# Patient Record
Sex: MALE | Race: WHITE | NOT HISPANIC OR LATINO | Employment: OTHER | URBAN - METROPOLITAN AREA
[De-identification: names, ages, dates, MRNs, and addresses within clinical notes are randomized per-mention and may not be internally consistent; named-entity substitution may affect disease eponyms.]

---

## 2018-01-28 ENCOUNTER — HOSPITAL ENCOUNTER (OUTPATIENT)
Dept: RADIOLOGY | Facility: MEDICAL CENTER | Age: 58
End: 2018-01-28

## 2018-01-28 ENCOUNTER — HOSPITAL ENCOUNTER (INPATIENT)
Facility: MEDICAL CENTER | Age: 58
LOS: 12 days | DRG: 166 | End: 2018-02-09
Attending: HOSPITALIST | Admitting: HOSPITALIST
Payer: OTHER MISCELLANEOUS

## 2018-01-28 ENCOUNTER — HOSPITAL ENCOUNTER (OUTPATIENT)
Facility: MEDICAL CENTER | Age: 58
End: 2018-01-28

## 2018-01-28 ENCOUNTER — RESOLUTE PROFESSIONAL BILLING HOSPITAL PROF FEE (OUTPATIENT)
Dept: HOSPITALIST | Facility: MEDICAL CENTER | Age: 58
End: 2018-01-28
Payer: OTHER MISCELLANEOUS

## 2018-01-28 DIAGNOSIS — J98.4 CAVITARY LESION OF LUNG: ICD-10-CM

## 2018-01-28 DIAGNOSIS — J85.0 NECROTIZING PNEUMONIA (HCC): ICD-10-CM

## 2018-01-28 PROBLEM — R91.8 MULTIPLE PULMONARY NODULES: Status: ACTIVE | Noted: 2018-01-28

## 2018-01-28 PROBLEM — J18.9 PNEUMONIA: Status: ACTIVE | Noted: 2018-01-28

## 2018-01-28 LAB
ALBUMIN SERPL BCP-MCNC: 2.3 G/DL (ref 3.2–4.9)
ALBUMIN/GLOB SERPL: 0.7 G/DL
ALP SERPL-CCNC: 88 U/L (ref 30–99)
ALT SERPL-CCNC: 8 U/L (ref 2–50)
ANION GAP SERPL CALC-SCNC: 17 MMOL/L (ref 0–11.9)
AST SERPL-CCNC: 9 U/L (ref 12–45)
BILIRUB SERPL-MCNC: 0.3 MG/DL (ref 0.1–1.5)
BUN SERPL-MCNC: 27 MG/DL (ref 8–22)
CALCIUM SERPL-MCNC: 8.5 MG/DL (ref 8.5–10.5)
CHLORIDE SERPL-SCNC: 95 MMOL/L (ref 96–112)
CO2 SERPL-SCNC: 14 MMOL/L (ref 20–33)
CREAT SERPL-MCNC: 1.1 MG/DL (ref 0.5–1.4)
GLOBULIN SER CALC-MCNC: 3.4 G/DL (ref 1.9–3.5)
GLUCOSE BLD-MCNC: 340 MG/DL (ref 65–99)
GLUCOSE BLD-MCNC: 379 MG/DL (ref 65–99)
GLUCOSE SERPL-MCNC: 409 MG/DL (ref 65–99)
LACTATE BLD-SCNC: 1.8 MMOL/L (ref 0.5–2)
LACTATE BLD-SCNC: 2 MMOL/L (ref 0.5–2)
POTASSIUM SERPL-SCNC: 3.7 MMOL/L (ref 3.6–5.5)
PROCALCITONIN SERPL-MCNC: 6.26 NG/ML
PROT SERPL-MCNC: 5.7 G/DL (ref 6–8.2)
SODIUM SERPL-SCNC: 126 MMOL/L (ref 135–145)
TSH SERPL DL<=0.005 MIU/L-ACNC: 0.84 UIU/ML (ref 0.38–5.33)

## 2018-01-28 PROCEDURE — 84443 ASSAY THYROID STIM HORMONE: CPT

## 2018-01-28 PROCEDURE — 700102 HCHG RX REV CODE 250 W/ 637 OVERRIDE(OP): Performed by: HOSPITALIST

## 2018-01-28 PROCEDURE — 86606 ASPERGILLUS ANTIBODY: CPT

## 2018-01-28 PROCEDURE — 94760 N-INVAS EAR/PLS OXIMETRY 1: CPT

## 2018-01-28 PROCEDURE — 99223 1ST HOSP IP/OBS HIGH 75: CPT | Performed by: HOSPITALIST

## 2018-01-28 PROCEDURE — A9270 NON-COVERED ITEM OR SERVICE: HCPCS | Performed by: HOSPITALIST

## 2018-01-28 PROCEDURE — 770006 HCHG ROOM/CARE - MED/SURG/GYN SEMI*

## 2018-01-28 PROCEDURE — 36415 COLL VENOUS BLD VENIPUNCTURE: CPT

## 2018-01-28 PROCEDURE — 82962 GLUCOSE BLOOD TEST: CPT | Mod: 91

## 2018-01-28 PROCEDURE — 84145 PROCALCITONIN (PCT): CPT

## 2018-01-28 PROCEDURE — 83605 ASSAY OF LACTIC ACID: CPT | Mod: 91

## 2018-01-28 PROCEDURE — 80053 COMPREHEN METABOLIC PANEL: CPT

## 2018-01-28 PROCEDURE — 700105 HCHG RX REV CODE 258: Performed by: HOSPITALIST

## 2018-01-28 PROCEDURE — 87040 BLOOD CULTURE FOR BACTERIA: CPT

## 2018-01-28 PROCEDURE — 700111 HCHG RX REV CODE 636 W/ 250 OVERRIDE (IP): Performed by: HOSPITALIST

## 2018-01-28 PROCEDURE — 80061 LIPID PANEL: CPT

## 2018-01-28 PROCEDURE — 86635 COCCIDIOIDES ANTIBODY: CPT | Mod: 91

## 2018-01-28 PROCEDURE — 85007 BL SMEAR W/DIFF WBC COUNT: CPT

## 2018-01-28 PROCEDURE — 85027 COMPLETE CBC AUTOMATED: CPT

## 2018-01-28 RX ORDER — ACETAMINOPHEN 325 MG/1
650 TABLET ORAL EVERY 6 HOURS PRN
Status: DISCONTINUED | OUTPATIENT
Start: 2018-01-28 | End: 2018-02-09 | Stop reason: HOSPADM

## 2018-01-28 RX ORDER — PROMETHAZINE HYDROCHLORIDE 25 MG/1
12.5-25 SUPPOSITORY RECTAL EVERY 4 HOURS PRN
Status: DISCONTINUED | OUTPATIENT
Start: 2018-01-28 | End: 2018-02-09 | Stop reason: HOSPADM

## 2018-01-28 RX ORDER — AMOXICILLIN 250 MG
2 CAPSULE ORAL 2 TIMES DAILY
Status: DISCONTINUED | OUTPATIENT
Start: 2018-01-28 | End: 2018-02-09 | Stop reason: HOSPADM

## 2018-01-28 RX ORDER — ONDANSETRON 4 MG/1
4 TABLET, ORALLY DISINTEGRATING ORAL EVERY 4 HOURS PRN
Status: DISCONTINUED | OUTPATIENT
Start: 2018-01-28 | End: 2018-02-09 | Stop reason: HOSPADM

## 2018-01-28 RX ORDER — AZITHROMYCIN 250 MG/1
500 TABLET, FILM COATED ORAL ONCE
Status: DISCONTINUED | OUTPATIENT
Start: 2018-01-28 | End: 2018-01-28

## 2018-01-28 RX ORDER — OXYCODONE HYDROCHLORIDE 5 MG/1
2.5 TABLET ORAL
Status: DISCONTINUED | OUTPATIENT
Start: 2018-01-28 | End: 2018-02-09 | Stop reason: HOSPADM

## 2018-01-28 RX ORDER — OXYCODONE HYDROCHLORIDE 5 MG/1
5 TABLET ORAL
Status: DISCONTINUED | OUTPATIENT
Start: 2018-01-28 | End: 2018-02-09 | Stop reason: HOSPADM

## 2018-01-28 RX ORDER — DEXTROSE MONOHYDRATE 25 G/50ML
25 INJECTION, SOLUTION INTRAVENOUS
Status: DISCONTINUED | OUTPATIENT
Start: 2018-01-28 | End: 2018-02-09 | Stop reason: HOSPADM

## 2018-01-28 RX ORDER — GUAIFENESIN/DEXTROMETHORPHAN 100-10MG/5
10 SYRUP ORAL EVERY 6 HOURS PRN
Status: DISCONTINUED | OUTPATIENT
Start: 2018-01-28 | End: 2018-01-28

## 2018-01-28 RX ORDER — VORICONAZOLE 200 MG/1
200 TABLET, FILM COATED ORAL EVERY 12 HOURS
Status: DISCONTINUED | OUTPATIENT
Start: 2018-01-28 | End: 2018-02-06

## 2018-01-28 RX ORDER — SODIUM CHLORIDE 9 MG/ML
INJECTION, SOLUTION INTRAVENOUS CONTINUOUS
Status: DISCONTINUED | OUTPATIENT
Start: 2018-01-28 | End: 2018-01-30

## 2018-01-28 RX ORDER — PROMETHAZINE HYDROCHLORIDE 25 MG/1
12.5-25 TABLET ORAL EVERY 4 HOURS PRN
Status: DISCONTINUED | OUTPATIENT
Start: 2018-01-28 | End: 2018-02-09 | Stop reason: HOSPADM

## 2018-01-28 RX ORDER — IBUPROFEN 800 MG/1
400 TABLET ORAL EVERY 6 HOURS PRN
Status: DISCONTINUED | OUTPATIENT
Start: 2018-01-28 | End: 2018-02-09 | Stop reason: HOSPADM

## 2018-01-28 RX ORDER — ZOLPIDEM TARTRATE 5 MG/1
5 TABLET ORAL
Status: DISCONTINUED | OUTPATIENT
Start: 2018-01-28 | End: 2018-02-09 | Stop reason: HOSPADM

## 2018-01-28 RX ORDER — BISACODYL 10 MG
10 SUPPOSITORY, RECTAL RECTAL
Status: DISCONTINUED | OUTPATIENT
Start: 2018-01-28 | End: 2018-02-09 | Stop reason: HOSPADM

## 2018-01-28 RX ORDER — MORPHINE SULFATE 4 MG/ML
2 INJECTION, SOLUTION INTRAMUSCULAR; INTRAVENOUS
Status: DISCONTINUED | OUTPATIENT
Start: 2018-01-28 | End: 2018-02-09 | Stop reason: HOSPADM

## 2018-01-28 RX ORDER — AZITHROMYCIN 250 MG/1
250 TABLET, FILM COATED ORAL DAILY
Status: DISCONTINUED | OUTPATIENT
Start: 2018-01-29 | End: 2018-01-28

## 2018-01-28 RX ORDER — ONDANSETRON 2 MG/ML
4 INJECTION INTRAMUSCULAR; INTRAVENOUS EVERY 4 HOURS PRN
Status: DISCONTINUED | OUTPATIENT
Start: 2018-01-28 | End: 2018-02-09 | Stop reason: HOSPADM

## 2018-01-28 RX ORDER — POLYETHYLENE GLYCOL 3350 17 G/17G
1 POWDER, FOR SOLUTION ORAL
Status: DISCONTINUED | OUTPATIENT
Start: 2018-01-28 | End: 2018-02-09 | Stop reason: HOSPADM

## 2018-01-28 RX ADMIN — GUAIFENESIN 200 MG: 100 SOLUTION ORAL at 21:26

## 2018-01-28 RX ADMIN — SODIUM CHLORIDE: 9 INJECTION, SOLUTION INTRAVENOUS at 20:54

## 2018-01-28 RX ADMIN — TAZOBACTAM SODIUM AND PIPERACILLIN SODIUM 3.38 G: 375; 3 INJECTION, SOLUTION INTRAVENOUS at 20:55

## 2018-01-28 RX ADMIN — INSULIN HUMAN 6 UNITS: 100 INJECTION, SOLUTION PARENTERAL at 21:20

## 2018-01-28 RX ADMIN — VORICONAZOLE 200 MG: 200 TABLET, FILM COATED ORAL at 21:26

## 2018-01-28 RX ADMIN — SODIUM CHLORIDE: 9 INJECTION, SOLUTION INTRAVENOUS at 21:32

## 2018-01-28 ASSESSMENT — COPD QUESTIONNAIRES
HAVE YOU SMOKED AT LEAST 100 CIGARETTES IN YOUR ENTIRE LIFE: NO/DON'T KNOW
DURING THE PAST 4 WEEKS HOW MUCH DID YOU FEEL SHORT OF BREATH: NONE/LITTLE OF THE TIME
DO YOU EVER COUGH UP ANY MUCUS OR PHLEGM?: NO/ONLY WITH OCCASIONAL COLDS OR INFECTIONS
COPD SCREENING SCORE: 1

## 2018-01-28 ASSESSMENT — PAIN SCALES - GENERAL
PAINLEVEL_OUTOF10: 0
PAINLEVEL_OUTOF10: 0

## 2018-01-28 ASSESSMENT — PATIENT HEALTH QUESTIONNAIRE - PHQ9
SUM OF ALL RESPONSES TO PHQ9 QUESTIONS 1 AND 2: 0
SUM OF ALL RESPONSES TO PHQ QUESTIONS 1-9: 0
2. FEELING DOWN, DEPRESSED, IRRITABLE, OR HOPELESS: NOT AT ALL
1. LITTLE INTEREST OR PLEASURE IN DOING THINGS: NOT AT ALL

## 2018-01-28 ASSESSMENT — LIFESTYLE VARIABLES: EVER_SMOKED: NEVER

## 2018-01-28 NOTE — PROGRESS NOTES
Medical records received from [facility]:  ED report; Labs; CXR; CT chest; and Demographics.  Scanned into Media tab.

## 2018-01-28 NOTE — PROGRESS NOTES
Direct admit from [facility], Dr. Ball, [phone].  Accepted by Dr. Mota for Pneumonia and multiple bilateral cavitary nodules.  ADT signed & held @ [phone], needs to be released upon pt arrival.  No written orders received.  Pt coming by ground.

## 2018-01-28 NOTE — LETTER
January 29, 2018    Patient: Everardo Breaux   YOB: 1960   Date of Visit: 1/28/2018 to January 29, 2018       To Whom It May Concern:    Everardo Breaux was seen and treated at [facility].  He is currently admitted. Discharge date is unknown.             Sincerely,     Dr. Lito Giraldo

## 2018-01-29 PROBLEM — E87.6 HYPOKALEMIA: Status: ACTIVE | Noted: 2018-01-29

## 2018-01-29 PROBLEM — E11.9 TYPE II DIABETES MELLITUS (HCC): Chronic | Status: ACTIVE | Noted: 2018-01-29

## 2018-01-29 PROBLEM — E87.1 HYPONATREMIA: Status: ACTIVE | Noted: 2018-01-29

## 2018-01-29 PROBLEM — D64.9 NORMOCYTIC ANEMIA: Status: ACTIVE | Noted: 2018-01-29

## 2018-01-29 PROBLEM — E88.09 HYPOALBUMINEMIA: Status: ACTIVE | Noted: 2018-01-29

## 2018-01-29 PROBLEM — E83.51 HYPOCALCEMIA: Status: ACTIVE | Noted: 2018-01-29

## 2018-01-29 LAB
ALBUMIN SERPL BCP-MCNC: 2.6 G/DL (ref 3.2–4.9)
ALBUMIN/GLOB SERPL: 0.9 G/DL
ALP SERPL-CCNC: 74 U/L (ref 30–99)
ALT SERPL-CCNC: 7 U/L (ref 2–50)
ANION GAP SERPL CALC-SCNC: 15 MMOL/L (ref 0–11.9)
AST SERPL-CCNC: 11 U/L (ref 12–45)
BASOPHILS # BLD AUTO: 1.7 % (ref 0–1.8)
BASOPHILS # BLD: 0.15 K/UL (ref 0–0.12)
BILIRUB SERPL-MCNC: 0.3 MG/DL (ref 0.1–1.5)
BUN SERPL-MCNC: 27 MG/DL (ref 8–22)
CALCIUM SERPL-MCNC: 7.7 MG/DL (ref 8.5–10.5)
CHLORIDE SERPL-SCNC: 96 MMOL/L (ref 96–112)
CHOLEST SERPL-MCNC: 78 MG/DL (ref 100–199)
CO2 SERPL-SCNC: 17 MMOL/L (ref 20–33)
CREAT SERPL-MCNC: 0.97 MG/DL (ref 0.5–1.4)
EOSINOPHIL # BLD AUTO: 0.08 K/UL (ref 0–0.51)
EOSINOPHIL NFR BLD: 0.9 % (ref 0–6.9)
ERYTHROCYTE [DISTWIDTH] IN BLOOD BY AUTOMATED COUNT: 41.4 FL (ref 35.9–50)
GLOBULIN SER CALC-MCNC: 2.8 G/DL (ref 1.9–3.5)
GLUCOSE BLD-MCNC: 211 MG/DL (ref 65–99)
GLUCOSE BLD-MCNC: 241 MG/DL (ref 65–99)
GLUCOSE BLD-MCNC: 290 MG/DL (ref 65–99)
GLUCOSE BLD-MCNC: 315 MG/DL (ref 65–99)
GLUCOSE SERPL-MCNC: 290 MG/DL (ref 65–99)
GRAM STN SPEC: NORMAL
HCT VFR BLD AUTO: 32.7 % (ref 42–52)
HDLC SERPL-MCNC: 17 MG/DL
HGB BLD-MCNC: 10.8 G/DL (ref 14–18)
HIV 1+2 AB+HIV1 P24 AG SERPL QL IA: NON REACTIVE
LDLC SERPL CALC-MCNC: 44 MG/DL
LYMPHOCYTES # BLD AUTO: 1.53 K/UL (ref 1–4.8)
LYMPHOCYTES NFR BLD: 17.4 % (ref 22–41)
MANUAL DIFF BLD: NORMAL
MCH RBC QN AUTO: 28.6 PG (ref 27–33)
MCHC RBC AUTO-ENTMCNC: 33 G/DL (ref 33.7–35.3)
MCV RBC AUTO: 86.5 FL (ref 81.4–97.8)
MONOCYTES # BLD AUTO: 0.84 K/UL (ref 0–0.85)
MONOCYTES NFR BLD AUTO: 9.5 % (ref 0–13.4)
MORPHOLOGY BLD-IMP: NORMAL
NEUTROPHILS # BLD AUTO: 6.2 K/UL (ref 1.82–7.42)
NEUTROPHILS NFR BLD: 69.6 % (ref 44–72)
NEUTS BAND NFR BLD MANUAL: 0.9 % (ref 0–10)
NRBC # BLD AUTO: 0 K/UL
NRBC BLD-RTO: 0 /100 WBC
PLATELET # BLD AUTO: 238 K/UL (ref 164–446)
PLATELET BLD QL SMEAR: NORMAL
PMV BLD AUTO: 10 FL (ref 9–12.9)
POTASSIUM SERPL-SCNC: 3.3 MMOL/L (ref 3.6–5.5)
PROT SERPL-MCNC: 5.4 G/DL (ref 6–8.2)
RBC # BLD AUTO: 3.78 M/UL (ref 4.7–6.1)
RBC BLD AUTO: NORMAL
SIGNIFICANT IND 70042: NORMAL
SITE SITE: NORMAL
SODIUM SERPL-SCNC: 128 MMOL/L (ref 135–145)
SOURCE SOURCE: NORMAL
TRIGL SERPL-MCNC: 83 MG/DL (ref 0–149)
WBC # BLD AUTO: 8.8 K/UL (ref 4.8–10.8)

## 2018-01-29 PROCEDURE — 94760 N-INVAS EAR/PLS OXIMETRY 1: CPT

## 2018-01-29 PROCEDURE — 700102 HCHG RX REV CODE 250 W/ 637 OVERRIDE(OP): Performed by: HOSPITALIST

## 2018-01-29 PROCEDURE — A9270 NON-COVERED ITEM OR SERVICE: HCPCS | Performed by: HOSPITALIST

## 2018-01-29 PROCEDURE — 90670 PCV13 VACCINE IM: CPT | Performed by: FAMILY MEDICINE

## 2018-01-29 PROCEDURE — 87186 SC STD MICRODIL/AGAR DIL: CPT

## 2018-01-29 PROCEDURE — A9270 NON-COVERED ITEM OR SERVICE: HCPCS | Performed by: INTERNAL MEDICINE

## 2018-01-29 PROCEDURE — 700111 HCHG RX REV CODE 636 W/ 250 OVERRIDE (IP): Performed by: FAMILY MEDICINE

## 2018-01-29 PROCEDURE — A9270 NON-COVERED ITEM OR SERVICE: HCPCS | Performed by: FAMILY MEDICINE

## 2018-01-29 PROCEDURE — 94640 AIRWAY INHALATION TREATMENT: CPT

## 2018-01-29 PROCEDURE — 700101 HCHG RX REV CODE 250: Performed by: FAMILY MEDICINE

## 2018-01-29 PROCEDURE — 770006 HCHG ROOM/CARE - MED/SURG/GYN SEMI*

## 2018-01-29 PROCEDURE — 700105 HCHG RX REV CODE 258: Performed by: FAMILY MEDICINE

## 2018-01-29 PROCEDURE — 90471 IMMUNIZATION ADMIN: CPT

## 2018-01-29 PROCEDURE — 700102 HCHG RX REV CODE 250 W/ 637 OVERRIDE(OP): Performed by: FAMILY MEDICINE

## 2018-01-29 PROCEDURE — 82962 GLUCOSE BLOOD TEST: CPT | Mod: 91

## 2018-01-29 PROCEDURE — 87077 CULTURE AEROBIC IDENTIFY: CPT

## 2018-01-29 PROCEDURE — 87205 SMEAR GRAM STAIN: CPT

## 2018-01-29 PROCEDURE — 87389 HIV-1 AG W/HIV-1&-2 AB AG IA: CPT

## 2018-01-29 PROCEDURE — 87070 CULTURE OTHR SPECIMN AEROBIC: CPT

## 2018-01-29 PROCEDURE — 99233 SBSQ HOSP IP/OBS HIGH 50: CPT | Performed by: FAMILY MEDICINE

## 2018-01-29 PROCEDURE — 700102 HCHG RX REV CODE 250 W/ 637 OVERRIDE(OP): Performed by: INTERNAL MEDICINE

## 2018-01-29 PROCEDURE — 36415 COLL VENOUS BLD VENIPUNCTURE: CPT

## 2018-01-29 PROCEDURE — 700105 HCHG RX REV CODE 258: Performed by: HOSPITALIST

## 2018-01-29 RX ORDER — ALUMINA, MAGNESIA, AND SIMETHICONE 2400; 2400; 240 MG/30ML; MG/30ML; MG/30ML
10 SUSPENSION ORAL 4 TIMES DAILY PRN
Status: DISCONTINUED | OUTPATIENT
Start: 2018-01-29 | End: 2018-01-29

## 2018-01-29 RX ORDER — ALUMINA, MAGNESIA, AND SIMETHICONE 2400; 2400; 240 MG/30ML; MG/30ML; MG/30ML
10 SUSPENSION ORAL 4 TIMES DAILY PRN
Status: DISCONTINUED | OUTPATIENT
Start: 2018-01-29 | End: 2018-02-09 | Stop reason: HOSPADM

## 2018-01-29 RX ORDER — IPRATROPIUM BROMIDE AND ALBUTEROL SULFATE 2.5; .5 MG/3ML; MG/3ML
3 SOLUTION RESPIRATORY (INHALATION)
Status: DISCONTINUED | OUTPATIENT
Start: 2018-01-29 | End: 2018-02-09 | Stop reason: HOSPADM

## 2018-01-29 RX ORDER — DOXYCYCLINE 100 MG/1
100 TABLET ORAL EVERY 12 HOURS
Status: DISCONTINUED | OUTPATIENT
Start: 2018-01-29 | End: 2018-01-31

## 2018-01-29 RX ADMIN — VORICONAZOLE 200 MG: 200 TABLET, FILM COATED ORAL at 21:15

## 2018-01-29 RX ADMIN — SODIUM CHLORIDE: 9 INJECTION, SOLUTION INTRAVENOUS at 08:40

## 2018-01-29 RX ADMIN — GUAIFENESIN 200 MG: 100 SOLUTION ORAL at 15:19

## 2018-01-29 RX ADMIN — DOXYCYCLINE 100 MG: 100 TABLET ORAL at 14:38

## 2018-01-29 RX ADMIN — INSULIN HUMAN 5 UNITS: 100 INJECTION, SOLUTION PARENTERAL at 08:52

## 2018-01-29 RX ADMIN — VORICONAZOLE 200 MG: 200 TABLET, FILM COATED ORAL at 08:40

## 2018-01-29 RX ADMIN — ALUMINUM HYDROXIDE, MAGNESIUM HYDROXIDE,SIMETHICONE 10 ML: 400; 400; 40 LIQUID ORAL at 12:51

## 2018-01-29 RX ADMIN — SODIUM CHLORIDE: 9 INJECTION, SOLUTION INTRAVENOUS at 16:26

## 2018-01-29 RX ADMIN — OXYCODONE HYDROCHLORIDE 2.5 MG: 5 TABLET ORAL at 18:10

## 2018-01-29 RX ADMIN — OXYCODONE HYDROCHLORIDE 2.5 MG: 5 TABLET ORAL at 12:27

## 2018-01-29 RX ADMIN — ENOXAPARIN SODIUM 40 MG: 100 INJECTION SUBCUTANEOUS at 12:17

## 2018-01-29 RX ADMIN — INSULIN HUMAN 3 UNITS: 100 INJECTION, SOLUTION PARENTERAL at 18:05

## 2018-01-29 RX ADMIN — GUAIFENESIN 200 MG: 100 SOLUTION ORAL at 02:23

## 2018-01-29 RX ADMIN — OXYCODONE HYDROCHLORIDE 5 MG: 5 TABLET ORAL at 02:23

## 2018-01-29 RX ADMIN — PNEUMOCOCCAL 13-VALENT CONJUGATE VACCINE 0.5 ML: 2.2; 2.2; 2.2; 2.2; 2.2; 4.4; 2.2; 2.2; 2.2; 2.2; 2.2; 2.2; 2.2 INJECTION, SUSPENSION INTRAMUSCULAR at 14:38

## 2018-01-29 RX ADMIN — IPRATROPIUM BROMIDE AND ALBUTEROL SULFATE 3 ML: .5; 3 SOLUTION RESPIRATORY (INHALATION) at 20:22

## 2018-01-29 RX ADMIN — VANCOMYCIN HYDROCHLORIDE 2200 MG: 100 INJECTION, POWDER, LYOPHILIZED, FOR SOLUTION INTRAVENOUS at 16:26

## 2018-01-29 RX ADMIN — INSULIN HUMAN 6 UNITS: 100 INJECTION, SOLUTION PARENTERAL at 12:17

## 2018-01-29 RX ADMIN — INSULIN HUMAN 3 UNITS: 100 INJECTION, SOLUTION PARENTERAL at 21:19

## 2018-01-29 RX ADMIN — OXYCODONE HYDROCHLORIDE 5 MG: 5 TABLET ORAL at 23:10

## 2018-01-29 RX ADMIN — DOXYCYCLINE 100 MG: 100 TABLET ORAL at 21:15

## 2018-01-29 ASSESSMENT — PATIENT HEALTH QUESTIONNAIRE - PHQ9
SUM OF ALL RESPONSES TO PHQ9 QUESTIONS 1 AND 2: 0
SUM OF ALL RESPONSES TO PHQ QUESTIONS 1-9: 0
1. LITTLE INTEREST OR PLEASURE IN DOING THINGS: NOT AT ALL
2. FEELING DOWN, DEPRESSED, IRRITABLE, OR HOPELESS: NOT AT ALL

## 2018-01-29 ASSESSMENT — LIFESTYLE VARIABLES
HAVE PEOPLE ANNOYED YOU BY CRITICIZING YOUR DRINKING: NO
HAVE YOU EVER FELT YOU SHOULD CUT DOWN ON YOUR DRINKING: NO
CONSUMPTION TOTAL: NEGATIVE
AVERAGE NUMBER OF DAYS PER WEEK YOU HAVE A DRINK CONTAINING ALCOHOL: 0
EVER_SMOKED: YES
EVER HAD A DRINK FIRST THING IN THE MORNING TO STEADY YOUR NERVES TO GET RID OF A HANGOVER: NO
EVER FELT BAD OR GUILTY ABOUT YOUR DRINKING: NO
HOW MANY TIMES IN THE PAST YEAR HAVE YOU HAD 5 OR MORE DRINKS IN A DAY: 0
ON A TYPICAL DAY WHEN YOU DRINK ALCOHOL HOW MANY DRINKS DO YOU HAVE: 0
TOTAL SCORE: 0
ALCOHOL_USE: YES
TOTAL SCORE: 0
TOTAL SCORE: 0

## 2018-01-29 ASSESSMENT — ENCOUNTER SYMPTOMS
HEARTBURN: 0
BLURRED VISION: 0
NAUSEA: 0
COUGH: 0
NECK PAIN: 0
DEPRESSION: 0
DIZZINESS: 0
PALPITATIONS: 0
HEMOPTYSIS: 0
HEADACHES: 0
FEVER: 0
DOUBLE VISION: 0
MYALGIAS: 0
CHILLS: 0
BRUISES/BLEEDS EASILY: 0

## 2018-01-29 ASSESSMENT — PAIN SCALES - GENERAL
PAINLEVEL_OUTOF10: 0
PAINLEVEL_OUTOF10: 6
PAINLEVEL_OUTOF10: 0
PAINLEVEL_OUTOF10: 0
PAINLEVEL_OUTOF10: 1
PAINLEVEL_OUTOF10: 4
PAINLEVEL_OUTOF10: 7

## 2018-01-29 NOTE — PROGRESS NOTES
I am continually monitoring patient. I assumed care of pt at 07:00. Patient denies current needs. Plan of care for shift discussed with patient. Patient up independently and gait steady. Patient has call light in place, bed low position, and all needs are met.
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
## 2018-01-29 NOTE — PROGRESS NOTES
Patient arrived to unit. Height, wt, and med rec complete. Patient oriented to unit. Consent to treat signed and placed in chart. Dr. Ga paged for notification as Dr. Mota is no longer accepting direct admissions.
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
## 2018-01-29 NOTE — DISCHARGE PLANNING
note:  Rounding with MD done  MD to call pulmonologist and Id.  Dr. Bermudez was in room and she will recommend po abx.

## 2018-01-30 LAB
APTT PPP: 39.1 SEC (ref 24.7–36)
EST. AVERAGE GLUCOSE BLD GHB EST-MCNC: 312 MG/DL
GLUCOSE BLD-MCNC: 232 MG/DL (ref 65–99)
GLUCOSE BLD-MCNC: 266 MG/DL (ref 65–99)
GLUCOSE BLD-MCNC: 278 MG/DL (ref 65–99)
GLUCOSE BLD-MCNC: 452 MG/DL (ref 65–99)
HBA1C MFR BLD: 12.5 % (ref 0–5.6)
INR PPP: 1.19 (ref 0.87–1.13)
PROCALCITONIN SERPL-MCNC: 1.77 NG/ML
PROTHROMBIN TIME: 14.8 SEC (ref 12–14.6)

## 2018-01-30 PROCEDURE — 700102 HCHG RX REV CODE 250 W/ 637 OVERRIDE(OP): Performed by: INTERNAL MEDICINE

## 2018-01-30 PROCEDURE — 700102 HCHG RX REV CODE 250 W/ 637 OVERRIDE(OP): Performed by: HOSPITALIST

## 2018-01-30 PROCEDURE — 700105 HCHG RX REV CODE 258: Performed by: FAMILY MEDICINE

## 2018-01-30 PROCEDURE — 85730 THROMBOPLASTIN TIME PARTIAL: CPT

## 2018-01-30 PROCEDURE — 82962 GLUCOSE BLOOD TEST: CPT

## 2018-01-30 PROCEDURE — 700111 HCHG RX REV CODE 636 W/ 250 OVERRIDE (IP): Performed by: INTERNAL MEDICINE

## 2018-01-30 PROCEDURE — A9270 NON-COVERED ITEM OR SERVICE: HCPCS | Performed by: INTERNAL MEDICINE

## 2018-01-30 PROCEDURE — A9270 NON-COVERED ITEM OR SERVICE: HCPCS | Performed by: HOSPITALIST

## 2018-01-30 PROCEDURE — 85610 PROTHROMBIN TIME: CPT

## 2018-01-30 PROCEDURE — 770006 HCHG ROOM/CARE - MED/SURG/GYN SEMI*

## 2018-01-30 PROCEDURE — 36415 COLL VENOUS BLD VENIPUNCTURE: CPT

## 2018-01-30 PROCEDURE — 99232 SBSQ HOSP IP/OBS MODERATE 35: CPT | Performed by: INTERNAL MEDICINE

## 2018-01-30 PROCEDURE — 84145 PROCALCITONIN (PCT): CPT

## 2018-01-30 PROCEDURE — 700111 HCHG RX REV CODE 636 W/ 250 OVERRIDE (IP): Performed by: FAMILY MEDICINE

## 2018-01-30 PROCEDURE — 83036 HEMOGLOBIN GLYCOSYLATED A1C: CPT

## 2018-01-30 PROCEDURE — 700105 HCHG RX REV CODE 258: Performed by: HOSPITALIST

## 2018-01-30 RX ORDER — FUROSEMIDE 10 MG/ML
40 INJECTION INTRAMUSCULAR; INTRAVENOUS ONCE
Status: COMPLETED | OUTPATIENT
Start: 2018-01-30 | End: 2018-01-30

## 2018-01-30 RX ORDER — INSULIN GLARGINE 100 [IU]/ML
8 INJECTION, SOLUTION SUBCUTANEOUS EVERY EVENING
Status: DISCONTINUED | OUTPATIENT
Start: 2018-01-30 | End: 2018-02-01

## 2018-01-30 RX ADMIN — INSULIN GLARGINE 8 UNITS: 100 INJECTION, SOLUTION SUBCUTANEOUS at 19:50

## 2018-01-30 RX ADMIN — INSULIN HUMAN 5 UNITS: 100 INJECTION, SOLUTION PARENTERAL at 17:32

## 2018-01-30 RX ADMIN — GUAIFENESIN 200 MG: 100 SOLUTION ORAL at 03:14

## 2018-01-30 RX ADMIN — ENOXAPARIN SODIUM 40 MG: 100 INJECTION SUBCUTANEOUS at 08:20

## 2018-01-30 RX ADMIN — INSULIN HUMAN 9 UNITS: 100 INJECTION, SOLUTION PARENTERAL at 19:49

## 2018-01-30 RX ADMIN — VORICONAZOLE 200 MG: 200 TABLET, FILM COATED ORAL at 08:21

## 2018-01-30 RX ADMIN — INSULIN HUMAN 3 UNITS: 100 INJECTION, SOLUTION PARENTERAL at 08:22

## 2018-01-30 RX ADMIN — DOXYCYCLINE 100 MG: 100 TABLET ORAL at 19:40

## 2018-01-30 RX ADMIN — DOXYCYCLINE 100 MG: 100 TABLET ORAL at 08:21

## 2018-01-30 RX ADMIN — VANCOMYCIN HYDROCHLORIDE 1500 MG: 100 INJECTION, POWDER, LYOPHILIZED, FOR SOLUTION INTRAVENOUS at 17:27

## 2018-01-30 RX ADMIN — FUROSEMIDE 40 MG: 10 INJECTION, SOLUTION INTRAMUSCULAR; INTRAVENOUS at 13:37

## 2018-01-30 RX ADMIN — OXYCODONE HYDROCHLORIDE 5 MG: 5 TABLET ORAL at 17:27

## 2018-01-30 RX ADMIN — VORICONAZOLE 200 MG: 200 TABLET, FILM COATED ORAL at 19:40

## 2018-01-30 RX ADMIN — INSULIN HUMAN 5 UNITS: 100 INJECTION, SOLUTION PARENTERAL at 11:58

## 2018-01-30 RX ADMIN — VANCOMYCIN HYDROCHLORIDE 1500 MG: 100 INJECTION, POWDER, LYOPHILIZED, FOR SOLUTION INTRAVENOUS at 05:45

## 2018-01-30 RX ADMIN — SODIUM CHLORIDE: 9 INJECTION, SOLUTION INTRAVENOUS at 05:46

## 2018-01-30 ASSESSMENT — ENCOUNTER SYMPTOMS
MYALGIAS: 0
HEADACHES: 0
BLURRED VISION: 0
HEMOPTYSIS: 0
DEPRESSION: 0
ABDOMINAL PAIN: 0
DIZZINESS: 0
DOUBLE VISION: 0
BRUISES/BLEEDS EASILY: 0
SHORTNESS OF BREATH: 0
NAUSEA: 0
PALPITATIONS: 0
HEARTBURN: 0
VOMITING: 0
SPUTUM PRODUCTION: 1
CHILLS: 0
NECK PAIN: 0
FEVER: 1
WHEEZING: 0
DIARRHEA: 0
COUGH: 1
FEVER: 0

## 2018-01-30 ASSESSMENT — PAIN SCALES - GENERAL
PAINLEVEL_OUTOF10: 0
PAINLEVEL_OUTOF10: 5
PAINLEVEL_OUTOF10: 0
PAINLEVEL_OUTOF10: 2

## 2018-01-30 NOTE — PROGRESS NOTES
Pulmonary Critical Care Progress Note        Chief Complaint: Productive cough,fever    History of Present Illness: 57 y.o. male with history of DM type 2 presented to the ER as a transfer from [place]. He had symptoms of productive cough for 02 weeks duration accompanied by fever and sweating off and on. Expectoration is greenish brown. No hemoptysis.   Patient is a  and reports being exposed to sick contacts on a regular basis. He also has environmental exposure to dust, smoke and chemical toxins. He reports history of pneumonia twice previously. CT chest shows bilateral upper lobe consolidation with necrotic cavitary lesion and bilateral lower lobe patchy densities. Currently being treated empirically with antibiotics and antifungals. He also has hyponatremia and hyperglycemia.    ROS: Constitutional: Fever off and on, with sweating, generalized weakness: Respiratory: Dyspnea, productive cough, no pleuritic chest pain. No wheeze, Cardiac: Denies palpitations, angina, orthopnea or paroxysmal nocturnal dyspnea , GI: No nausea, vomiting abdominal pain.  All other systems negative. Neuro: Denies headache, dizziness or seizures. Gu: No hematuria or dysuria, Musculoskeletal: No joint inflammation or arthralgia.    Interval Events:  1/30/18: Hyperglycemia has improved. Frequency of cough decreased with copious expectoration. Hydration status has improved. Normal appetite. No dysuria    PFSH:  No change.    Respiratory:     Pulse Oximetry: 94 % on O2 supplementation        Exam: Not in respiratory distress  Accessory muscles of respiration are not prominent  Bilateral breath sounds audible with no wheeze or crepitations. No pleural rub.  Imaging: CT chest personally reviewed.  Bilateral upper lobe cavitary lesions thick walled with surrounding consolidation.  RML,RLL and LLL patchy infiltrates. No effusion    HemoDynamics:  Pulse: (!) 110  Blood Pressure: 146/83       Exam: First and second heart sounds  audible, tachycardia present. No murmur.        Neuro:  GCS      Exam: Alert and orientated to time place and person. No focal neurological deficit    Fluids:  Intake/Output       18 07 - 18 0659 18 - 18 0659 18 07 - 18 0659      [id number] 6188-8566 Total 5048-1891 1689-1112 Total 4029-2821 1406-3184 Total       Intake    I.V.  --  -- --  --  1300 1300  --  -- --    IV Piggyback Volume -- -- -- -- 300 300 -- -- --    IV Volume -- -- -- -- 1000 1000 -- -- --    Total Intake -- -- -- -- 1300 1300 -- -- --       Output    Urine  --  -- --  --  1525 1525  --  -- --    Void (ml) -- -- -- -- 1525 1525 -- -- --    Total Output -- -- -- -- 1525 1525 -- -- --       Net I/O     -- -- -- -- -225 -225 -- -- --           Recent Labs      18   SODIUM  126*  128*   POTASSIUM  3.7  3.3*   CHLORIDE  95*  96   CO2  14*  17*   BUN  27*  27*   CREATININE  1.10  0.97   CALCIUM  8.5  7.7*       GI/Nutrition:  Exam: Oral mucosa is moist.   Abdomen is soft, non tender. Bowel sounds audible.    Liver Function  Recent Labs      18   ALTSGPT  8  7   ASTSGOT  9*  11*   ALKPHOSPHAT  88  74   TBILIRUBIN  0.3  0.3   GLUCOSE  409*  290*       Heme:  Recent Labs      18   0135   RBC  3.78*   --    HEMOGLOBIN  10.8*   --    HEMATOCRIT  32.7*   --    PLATELETCT  238   --    PROTHROMBTM   --   14.8*   APTT   --   39.1*   INR   --   1.19*       Infectious Disease:  Temp  Av °C (98.6 °F)  Min: 36.4 °C (97.5 °F)  Max: 38.2 °C (100.8 °F)  Micro: Blood cultures no growth  Sputum Gram stain: Many WBCs and Gram positive cocci    Recent Labs      18   1909  18   2330   WBC   --   8.8   NEUTSPOLYS   --   69.60   LYMPHOCYTES   --   17.40*   MONOCYTES   --   9.50   EOSINOPHILS   --   0.90   BASOPHILS   --   1.70   ASTSGOT  9*  11*   ALTSGPT  8  7   ALKPHOSPHAT  88  74   TBILIRUBIN  0.3  0.3     Current  Facility-Administered Medications   Medication Dose Frequency Provider Last Rate Last Dose   • enoxaparin (LOVENOX) inj 40 mg  40 mg DAILY Lito Giraldo M.D.   40 mg at 01/30/18 0820   • mag hydrox-al hydrox-simeth (MAALOX PLUS ES or MYLANTA DS) suspension 10 mL  10 mL 4X/DAY PRN Lito Giraldo M.D.   10 mL at 01/29/18 1251   • doxycycline monohydrate (ADOXA) tablet 100 mg  100 mg Q12HRS Re Bermudez M.D.   100 mg at 01/30/18 0821   • MD ALERT... vancomycin per pharmacy protocol   pharmacy to dose Jaswant Obrien M.D.       • vancomycin 1,500 mg in  mL IVPB  17 mg/kg Q12HR Lito Giraldo M.D.   Stopped at 01/30/18 0745   • ipratropium-albuterol (DUONEB) nebulizer solution 3 mL  3 mL Q4H PRN (RT) Lito Giraldo M.D.   3 mL at 01/29/18 2022   • senna-docusate (PERICOLACE or SENOKOT S) 8.6-50 MG per tablet 2 Tab  2 Tab BID Brandon Mota M.D.        And   • polyethylene glycol/lytes (MIRALAX) PACKET 1 Packet  1 Packet QDAY PRN Brandon Mota M.D.        And   • magnesium hydroxide (MILK OF MAGNESIA) suspension 30 mL  30 mL QDAY PRN Brandon Mota M.D.        And   • bisacodyl (DULCOLAX) suppository 10 mg  10 mg QDAY PRN Brandon Mota M.D.       • Respiratory Care per Protocol   Continuous RT Brandon Mota M.D.       • acetaminophen (TYLENOL) tablet 650 mg  650 mg Q6HRS PRN Brandon Mota M.D.       • Pharmacy Consult Request ...Pain Management Review   PRN Brandon Mota M.D.        And   • oxycodone immediate-release (ROXICODONE) tablet 2.5 mg  2.5 mg Q3HRS PRN Brandon Mota M.D.   2.5 mg at 01/29/18 1810    And   • oxycodone immediate-release (ROXICODONE) tablet 5 mg  5 mg Q3HRS PRLULU Mota M.D.   5 mg at 01/29/18 2310    And   • morphine (pf) 4 mg/ml injection 2 mg  2 mg Q3HRS PRLULU Mota M.D.       • insulin regular (HUMULIN R) injection 2-9 Units  2-9 Units 4X/DAY LANI Mota M.D.   5 Units at 01/30/18 1158     And   • glucose 4 g chewable tablet 16 g  16 g Q15 MIN PRN Brandon Mota M.D.        And   • dextrose 50% (D50W) injection 25 mL  25 mL Q15 MIN PRN Brandon Mota M.D.       • ondansetron (ZOFRAN) syringe/vial injection 4 mg  4 mg Q4HRS PRN Brandon Mota M.D.       • ondansetron (ZOFRAN ODT) dispertab 4 mg  4 mg Q4HRS PRN Brandon Mota M.D.       • promethazine (PHENERGAN) tablet 12.5-25 mg  12.5-25 mg Q4HRS PRN Brandon Mota M.D.       • promethazine (PHENERGAN) suppository 12.5-25 mg  12.5-25 mg Q4HRS PRN Brandon Mota M.D.       • prochlorperazine (COMPAZINE) injection 5-10 mg  5-10 mg Q4HRS PRN Brandon Mota M.D.       • zolpidem (AMBIEN) tablet 5 mg  5 mg HS PRN - MR X 1 Brandon Mota M.D.       • ibuprofen (MOTRIN) tablet 400 mg  400 mg Q6HRS PRN Brandon Mota M.D.       • NS infusion   Continuous Brandon Mota M.D. 100 mL/hr at 01/30/18 0546     • guaiFENesin (ROBITUSSIN) 100 MG/5ML solution 200 mg  10 mL Q4HRS PRN Brandon Mota M.D.   200 mg at 01/30/18 0314   • voriconazole (VFEND) tablet 200 mg  200 mg Q12HRS oDnald Ga M.D.   200 mg at 01/30/18 0821     Last reviewed on 1/29/2018 12:39 PM by Aminata Jamil R.N.    Quality  Measures:  Labs reviewed, Medications reviewed and Radiology images reviewed  Mei catheter: No Mei      DVT Prophylaxis: Enoxaparin (Lovenox)            Problems:  Necrotizing pneumonia Bilateral upper lobes.  Hyponatremia sec to hyperglycemia and SIADH not excluded yet  Hypokalemia  Diabetes Mellitus type 2 uncontrolled      Plan:  NPO after midnight  Bronchoscopy with BAL and TBBx tomorrow am under moderate sedation  Hold lovenox today evening  Lasix 40 mg IV stat  Repeat BMP and CBC in am  Continue vancomycin and voriconazole and bronchodilators.  Continue oxygen supplementation and titrate to maintain SpO2 above 94%.  Management plan discussed with patient and he agrees with plan.  Incentive  spirometry
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
## 2018-01-30 NOTE — CARE PLAN
Problem: Respiratory:  Goal: Respiratory status will improve    Intervention: Assess and monitor pulmonary status  On arrival patient on 4L/NC of O2. Patient coughed up copious sputum.  applied and patient able to maintain O2 sat above 90% on Ra. No dyspnea present. Patient has productive cough with small amount of sputum at this time.

## 2018-01-30 NOTE — CARE PLAN
Problem: Infection  Goal: Will remain free from infection    Intervention: Implement standard precautions and perform hand washing before and after patient contact  Discussed proper hand hygiene. Educated patient to assure staff was hands prior to contact with patient.       Problem: Venous Thromboembolism (VTW)/Deep Vein Thrombosis (DVT) Prevention:  Goal: Patient will participate in Venous Thrombosis (VTE)/Deep Vein Thrombosis (DVT)Prevention Measures    Intervention: Assess and monitor for anticoagulation complications  Lovenox 40mg started today. First dose given with first dose education. Patient able to teach back s/s of bleeding. Per pulmonologist, patient to have bronchoscopy tomorrow. Will report off to NOC RN to assure all anticoagulants are held prior to procedure.

## 2018-01-30 NOTE — PROGRESS NOTES
"Pharmacy Kinetics 57 y.o. male on vancomycin day # 1   2018    Currently on Vancomycin 1,500mg iv q12hr (0500, 1700)   > Loading dose of 25mg/kg x one administered today at ~ 1630     Indication for Treatment: Pneumonia     Pertinent history per medical record: Admitted on 2018 for fever/chills, N/V, cough (+ green sputum) with abnormal chest CT at outlying facility - diagnosed with bilateral cavitary lesions with URI concerning for possible atypical pneumonia. ID and pulmonary specialist consulted.     Other antibiotics: Doxycycline and Voriconazole     Allergies: Patient has no known allergies.     List concerns for renal function: BUN/SCr ratio > 20:1, Hypoalbuminemia, Obesity (BMI 30), Anion gap metabolic acidosis, uncontrolled diabetes     Pertinent cultures to date:   : PBCs x 2 = NGTD   : Sputum Cx = in process     Recent Labs      18   2330   WBC  8.8   NEUTSPOLYS  69.60   BANDSSTABS  0.90     Recent Labs      18   1909  18   2330   BUN  27*  27*   CREATININE  1.10  0.97   ALBUMIN  2.3*  2.6*     No results for input(s): VANCOTROUGH, VANCOPEAK, VANCORANDOM in the last 72 hours.No intake or output data in the 24 hours ending 18 1637   Blood pressure 121/67, pulse (!) 102, temperature 36.5 °C (97.7 °F), resp. rate 18, height 1.702 m (5' 7\"), weight 87.8 kg (193 lb 9 oz), SpO2 92 %. Temp (24hrs), Av.1 °C (98.8 °F), Min:36.5 °C (97.7 °F), Max:37.7 °C (99.8 °F)      A/P   1. Vancomycin dose change: New start   2. Next vancomycin level: Prior to 4th dose (not yet ordered)   3. Goal trough: 16 - 20 mcg/mL   4. Comments: ID and pulmonary consulting. ID working-up multiple differential diagnoses with atypical pathogens. Pulmonary planning for bronch with BAL and possible biopsy. First vancomycin trough level prior to 4th total dose of vancomycin when drug is expected to have reach steady state concentrations. Will monitor renal function closely while on vancomycin " therapy. Azotemia noted with multiple other concerns for renal function listed above. Patient is moderate-high risk for accumulation with repeated doses of vancomycin d/t obesity (BMI 30) - anticipate a higher volume of distribution of drug.     Day Spears, JensenD

## 2018-01-30 NOTE — PROGRESS NOTES
"Pharmacy Kinetics 57 y.o. male on vancomycin day # 2   2018    Currently on Vancomycin 1,500mg iv q12hr (0500, 1700)     Indication for Treatment: Pneumonia      Pertinent history per medical record: Admitted on 2018 for fever/chills, N/V, cough (+ green sputum) with abnormal chest CT at outlying facility - diagnosed with bilateral cavitary lesions with URI concerning for possible atypical pneumonia. ID and pulmonary specialist consulted.      Other antibiotics: Doxycycline and Voriconazole      Allergies: Patient has no known allergies.      List concerns for renal function: BUN/SCr ratio > 20:1, Hypoalbuminemia, Obesity (BMI 30), Anion gap metabolic acidosis, uncontrolled diabetes      Pertinent cultures to date:   : PBCs x 2 = NGTD   : Sputum Cx = in process     Recent Labs      18   2330   WBC  8.8   NEUTSPOLYS  69.60   BANDSSTABS  0.90     Recent Labs      18   1909  18   2330   BUN  27*  27*   CREATININE  1.10  0.97   ALBUMIN  2.3*  2.6*     No results for input(s): VANCOTROUGH, VANCOPEAK, VANCORANDOM in the last 72 hours.  Intake/Output Summary (Last 24 hours) at 18 0911  Last data filed at 18 0600   Gross per 24 hour   Intake             1300 ml   Output             1525 ml   Net             -225 ml      Blood pressure 117/55, pulse 97, temperature 36.7 °C (98.1 °F), resp. rate (!) 24, height 1.702 m (5' 7\"), weight 87.8 kg (193 lb 9 oz), SpO2 94 %. Temp (24hrs), Av.2 °C (98.9 °F), Min:36.7 °C (98.1 °F), Max:38.2 °C (100.8 °F)      A/P   1. Vancomycin dose change: Not indicated, continue current regimen.   2. Next vancomycin level: Tomorrow,  at 0430   3. Goal trough: 16 - 20 mcg/mL   4. Comments: ID and pulmonary consulting. ID working-up multiple differential diagnoses with atypical pathogens. Pulmonary planning for bronch with BAL and possible biopsy. First vancomycin trough level tomorrow morning prior to 4th total dose of vancomycin when " drug is expected to have reach steady state concentrations. Clinical pharmacist to adjust dosing as indicated. No new BMP - previous azotemia noted with multiple other concerns for renal function listed above. UOP is adequate per charting. Patient is moderate-high risk for accumulation with repeated doses of vancomycin d/t obesity (BMI 30) - anticipate a higher volume of distribution of drug. Ordered a BMP for tomorrow to monitor renal indices while on vancomycin therapy. Sputum culture in process, gram stain showing Many WBcs, few epithelials - indicating quality specimen (score +2) with many gram + cocci seen. PCT positive (6.26 on 1/28/18). Tmax 100.8 F, No CBC (WBC 8.8 on 1/28/18).     Day Spears, PharmD

## 2018-01-30 NOTE — PROGRESS NOTES
Infectious Disease Progress Note    Author: Re Bermudez M.D. Date & Time of service: 2018  2:47 PM    Chief Complaint:  Cavitary lung lesions      Interval History:  57-year-old diabetic  admitted due to worsening cough, found to have CT with bilateral upper lobe   cavitary lesions   Temp 100.8, sputum +SA Feels much better today  Labs Reviewed, Medications Reviewed and Radiology Reviewed.    Review of Systems:  Review of Systems   Constitutional: Positive for fever. Negative for chills.   Respiratory: Positive for cough and sputum production. Negative for hemoptysis, shortness of breath and wheezing.    Cardiovascular: Negative for chest pain.   Gastrointestinal: Negative for abdominal pain, diarrhea, nausea and vomiting.   All other systems reviewed and are negative.      Hemodynamics:  Temp (24hrs), Av °C (98.6 °F), Min:36.4 °C (97.5 °F), Max:38.2 °C (100.8 °F)  Temperature: 36.4 °C (97.5 °F)  Pulse  Av.7  Min: 85  Max: 113   Blood Pressure: 146/83       Physical Exam:  Physical Exam   Constitutional: He is oriented to person, place, and time. He appears well-developed. No distress.   HENT:   Head: Normocephalic and atraumatic.   Eyes: EOM are normal. Pupils are equal, round, and reactive to light.   Neck: Neck supple.   Cardiovascular: Normal rate.    No murmur heard.  Pulmonary/Chest: Effort normal. No respiratory distress. He has no wheezes. He has rales.   Abdominal: Soft. He exhibits no distension. There is no tenderness. There is no rebound.   Musculoskeletal: He exhibits no edema.   Neurological: He is alert and oriented to person, place, and time.   Skin: No rash noted.   Nursing note and vitals reviewed.      Meds:    Current Facility-Administered Medications:   •  mag hydrox-al hydrox-simeth  •  doxycycline monohydrate  •  MD ALERT... vancomycin  •  vancomycin  •  ipratropium-albuterol  •  senna-docusate **AND** polyethylene glycol/lytes **AND** magnesium hydroxide  **AND** bisacodyl  •  Respiratory Care per Protocol  •  acetaminophen  •  Notify provider if pain remains uncontrolled **AND** Use the numeric rating scale (NRS-11) on regular floors and Critical-Care Pain Observation Tool (CPOT) on ICUs/Trauma to assess pain **AND** Pulse Ox (Oximetry) **AND** Pharmacy Consult Request **AND** If patient difficult to arouse and/or has respiratory depression, stop any opiates that are currently infusing and call a Rapid Response. **AND** oxycodone immediate-release **AND** oxycodone immediate-release **AND** morphine injection  •  insulin regular **AND** Accu-Chek ACHS **AND** NOTIFY MD and PharmD **AND** glucose 4 g **AND** dextrose 50%  •  ondansetron  •  ondansetron  •  promethazine  •  promethazine  •  prochlorperazine  •  zolpidem  •  ibuprofen  •  NS  •  guaiFENesin  •  voriconazole    Labs:  Recent Labs      01/28/18   2330   WBC  8.8   RBC  3.78*   HEMOGLOBIN  10.8*   HEMATOCRIT  32.7*   MCV  86.5   MCH  28.6   RDW  41.4   PLATELETCT  238   MPV  10.0   NEUTSPOLYS  69.60   LYMPHOCYTES  17.40*   MONOCYTES  9.50   EOSINOPHILS  0.90   BASOPHILS  1.70   RBCMORPHOLO  Normal     Recent Labs      01/28/18   1909 01/28/18   2330   SODIUM  126*  128*   POTASSIUM  3.7  3.3*   CHLORIDE  95*  96   CO2  14*  17*   GLUCOSE  409*  290*   BUN  27*  27*     Recent Labs      01/28/18 1909 01/28/18   2330   ALBUMIN  2.3*  2.6*   TBILIRUBIN  0.3  0.3   ALKPHOSPHAT  88  74   TOTPROTEIN  5.7*  5.4*   ALTSGPT  8  7   ASTSGOT  9*  11*   CREATININE  1.10  0.97       Imaging:  No results found.    Micro:  Results     Procedure Component Value Units Date/Time    Culture Respiratory W/ GRM STN [[id number]]  (Abnormal) Collected:  01/29/18 0230    Order Status:  Completed Specimen:  Respirate from Sputum Updated:  01/30/18 1304     Significant Indicator POS (POS)     Source RESP     Site SPUTUM     Respiratory Culture -- (A)     Gram Stain Result --     Many WBCs.  Few epithelial cells.  Many Gram  "positive cocci.  Specimen Quality Score: 2+       Respiratory Culture -- (A)     Staphylococcus aureus  Heavy growth      Narrative:       Collected By:ANTONIA MILLER  Preferably before first antibiotic dose - add Gram stain if  indicated    MRSA BY PCR (AMP) [[id number]]     Order Status:  No result Specimen:  Respirate from Nares     GRAM STAIN [[id number]] Collected:  01/29/18 0230    Order Status:  Completed Specimen:  Respirate Updated:  01/29/18 1310     Significant Indicator .     Source RESP     Site SPUTUM     Gram Stain Result --     Many WBCs.  Few epithelial cells.  Many Gram positive cocci.  Specimen Quality Score: 2+      Narrative:       Collected By:ANTONIA MILLER  Preferably before first antibiotic dose - add Gram stain if  indicated    BLOOD CULTURE [[id number]] Collected:  01/28/18 1908    Order Status:  Completed Specimen:  Blood from Peripheral Updated:  01/29/18 0840     Significant Indicator NEG     Source BLD     Site PERIPHERAL     Blood Culture --     No Growth    Note: Blood cultures are incubated for 5 days and  are monitored continuously.Positive blood cultures  are called to the RN and reported as soon as  they are identified.      Narrative:       Per Hospital Policy: Only change Specimen Src: to \"Line\" if  specified by physician order.    BLOOD CULTURE [[id number]] Collected:  01/28/18 1908    Order Status:  Completed Specimen:  Blood from Peripheral Updated:  01/29/18 0840     Significant Indicator NEG     Source BLD     Site PERIPHERAL     Blood Culture --     No Growth    Note: Blood cultures are incubated for 5 days and  are monitored continuously.Positive blood cultures  are called to the RN and reported as soon as  they are identified.      Narrative:       Per Hospital Policy: Only change Specimen Src: to \"Line\" if  specified by physician order.          Assessment:  Active Hospital Problems    Diagnosis   • *Pneumonia [J18.9]   • Cavitary lesion of lung [J98.4] "   • Hyponatremia [E87.1]   • Hypokalemia [E87.6]   • Hypocalcemia [E83.51]   • Hypoalbuminemia [E88.09]   • Normocytic anemia [D64.9]   • Type II diabetes mellitus (CMS-HCC) [E11.9]       Plan:  Cavitary lung lesions, additional work  Low grade fever  No leukocytosis  B;lood cxs neg  HIV neg  Sputum +SA, sensi pending  Cocci serology pending  Bronch planned for tomorrow  May need JOI  Continue vanco, vori, and doxy    Diabetes, poorly controlled  Keep BS under 150 to help control current infection  [facility] A1C 12.5    Discussed with internal medicine Dr Marinelli

## 2018-01-30 NOTE — PROGRESS NOTES
Received report from day RN. Assumed pt care at 1900, 1/29. Discussed call light/phone system, communication board and POC. This RN provides education regarding meds, infection prevention, and treatment plan. Pt is aao x 4 and verbalizes understanding. Pt is receiving IV abx for PNA. Pt performs deep breathing and coughing exercises and IS. Pt is able to breathe in 1800 mL of air.  Pt reports back pain d/t coughing. This RN administers PO oxy and robitussin PRN per MAR. This RN provides full bed change. Pt mobility assessed. Pt is upself and steady. Pt calls appropriately. Fall precautions in place. Bed is locked and in low position, call light within reach. Treaded slippers in place. Pt needs met at this time. Hourly rounding in place.

## 2018-01-30 NOTE — PROGRESS NOTES
[facility] Hospitalist Progress Note    Date of Service: 2018    Chief Complaint  57 y.o. male admitted 2018 with cough and shortness of breath. Was found to have bilateral pneumonia and cavitary lesions.    Interval Problem Update  Feels okay this morning. Breathing okay. Saturating well on room air. Denies any chest pain. Tolerate by mouth intake well.    Consultants/Specialty  ID  Pulmonary medicine    Disposition  To be determined.        Review of Systems   Constitutional: Negative for chills and fever.   HENT: Negative for hearing loss and tinnitus.    Eyes: Negative for blurred vision and double vision.   Respiratory: Negative for cough and hemoptysis.    Cardiovascular: Negative for chest pain and palpitations.   Gastrointestinal: Negative for heartburn and nausea.   Genitourinary: Negative for dysuria and urgency.   Musculoskeletal: Negative for myalgias and neck pain.   Skin: Negative for rash.   Neurological: Negative for dizziness and headaches.   Endo/Heme/Allergies: Does not bruise/bleed easily.   Psychiatric/Behavioral: Negative for depression and suicidal ideas.      Physical Exam  Laboratory/Imaging   Hemodynamics  Temp (24hrs), Av.9 °C (98.4 °F), Min:36.2 °C (97.2 °F), Max:37.7 °C (99.8 °F)   Temperature: 36.5 °C (97.7 °F)  Pulse  Av.6  Min: 85  Max: 102    Blood Pressure: 121/67      Respiratory      Respiration: 18, Pulse Oximetry: 92 %, O2 Daily Delivery Respiratory : Room Air with O2 Available     Work Of Breathing / Effort: Mild  RUL Breath Sounds: Expiratory Wheezes, RML Breath Sounds: Expiratory Wheezes, RLL Breath Sounds: Expiratory Wheezes, JEANE Breath Sounds: Clear, LLL Breath Sounds: Diminished    Fluids  No intake or output data in the 24 hours ending 18 1618    Nutrition  Orders Placed This Encounter   Procedures   • Diet Order     Standing Status:   Standing     Number of Occurrences:   1     Order Specific Question:   Diet:     Answer:   Diabetic [3]   • DIET NPO      Standing Status:   Standing     Number of Occurrences:   1     Order Specific Question:   Restrict to:     Answer:   Strict [1]     Physical Exam   Constitutional: He is oriented to person, place, and time. He appears well-developed. No distress.   HENT:   Head: Normocephalic and atraumatic.   Eyes: Pupils are equal, round, and reactive to light. Left eye exhibits no discharge.   Neck: Normal range of motion. No thyromegaly present.   Cardiovascular: Normal rate and regular rhythm.  Exam reveals no friction rub.    Pulmonary/Chest: Effort normal. He has decreased breath sounds. He has no wheezes.   Abdominal: Soft. He exhibits no distension.   Musculoskeletal: Normal range of motion. He exhibits no edema.   Neurological: He is alert and oriented to person, place, and time.   Skin: Skin is warm. No erythema.   Psychiatric: He has a normal mood and affect. His behavior is normal.       Recent Labs      01/28/18   2330   WBC  8.8   RBC  3.78*   HEMOGLOBIN  10.8*   HEMATOCRIT  32.7*   MCV  86.5   MCH  28.6   MCHC  33.0*   RDW  41.4   PLATELETCT  238   MPV  10.0     Recent Labs      01/28/18   1909  01/28/18   2330   SODIUM  126*  128*   POTASSIUM  3.7  3.3*   CHLORIDE  95*  96   CO2  14*  17*   GLUCOSE  409*  290*   BUN  27*  27*   CREATININE  1.10  0.97   CALCIUM  8.5  7.7*             Recent Labs      01/28/18   2330   TRIGLYCERIDE  83   HDL  17*   LDL  44          Assessment/Plan     * Pneumonia   Assessment & Plan    Community-acquired pneumonia. Patient was also exposed to smoke during his recently. CT scan of the chest done in outside facility showed multiple cavitary lung lesions in the upper lobes. Also there are 2 lesions in the left upper lobe, 4.6 and 1.7 cm, as well as right upper lobe 6.5 cm. Infectious diseases and pulmonary medicine consulted. On IV antibiotics. Sputum culture showed gram-positive cocci so vancomycin was added to the regimen. Patient will have diagnostic bronchoscopy to be done in the  a.m.        Cavitary lesion of lung   Assessment & Plan    Bacterial versus fungal etiology. Patient is currently on broad-spectrum give Rx. Sputum Gram stain showed gram-positive cocci.        Hypoalbuminemia- (present on admission)   Assessment & Plan    Check CRP.        Hypocalcemia- (present on admission)   Assessment & Plan    Corrected calcium is 10.6 (normal).         Hypokalemia- (present on admission)   Assessment & Plan    On replacement.        Hyponatremia- (present on admission)   Assessment & Plan    This is likely secondary to lung pathology. On IV fluids with normal saline. Continue to monitor.        Type II diabetes mellitus (CMS-HCC)- (present on admission)   Assessment & Plan    We'll check hemoglobin A1c. Continue with slight scale insulin for now. We'll add long-acting insulin based on his blood sugar trend.        Normocytic anemia- (present on admission)   Assessment & Plan    Monitor H&H.            Reviewed items::  Labs reviewed, Radiology images reviewed and Medications reviewed  Mei catheter::  No Mei  DVT prophylaxis - mechanical:  SCDs  Antibiotics:  Treating active infection/contamination beyond 24 hours perioperative coverage

## 2018-01-31 ENCOUNTER — APPOINTMENT (OUTPATIENT)
Dept: RADIOLOGY | Facility: MEDICAL CENTER | Age: 58
DRG: 166 | End: 2018-01-31
Attending: INTERNAL MEDICINE
Payer: OTHER MISCELLANEOUS

## 2018-01-31 LAB
ANION GAP SERPL CALC-SCNC: 11 MMOL/L (ref 0–11.9)
BACTERIA SPEC RESP CULT: ABNORMAL
BACTERIA SPEC RESP CULT: ABNORMAL
BASOPHILS # BLD AUTO: 0.9 % (ref 0–1.8)
BASOPHILS # BLD: 0.1 K/UL (ref 0–0.12)
BUN SERPL-MCNC: 11 MG/DL (ref 8–22)
CALCIUM SERPL-MCNC: 8.2 MG/DL (ref 8.5–10.5)
CHLORIDE SERPL-SCNC: 101 MMOL/L (ref 96–112)
CO2 SERPL-SCNC: 21 MMOL/L (ref 20–33)
CREAT SERPL-MCNC: 0.49 MG/DL (ref 0.5–1.4)
CYTOLOGY REG CYTOL: NORMAL
EOSINOPHIL # BLD AUTO: 0.18 K/UL (ref 0–0.51)
EOSINOPHIL NFR BLD: 1.7 % (ref 0–6.9)
ERYTHROCYTE [DISTWIDTH] IN BLOOD BY AUTOMATED COUNT: 41.6 FL (ref 35.9–50)
FERRITIN SERPL-MCNC: 1585.5 NG/ML (ref 22–322)
GIANT PLATELETS BLD QL SMEAR: NORMAL
GLUCOSE BLD-MCNC: 157 MG/DL (ref 65–99)
GLUCOSE BLD-MCNC: 206 MG/DL (ref 65–99)
GLUCOSE BLD-MCNC: 213 MG/DL (ref 65–99)
GLUCOSE BLD-MCNC: 219 MG/DL (ref 65–99)
GLUCOSE SERPL-MCNC: 212 MG/DL (ref 65–99)
GRAM STN SPEC: ABNORMAL
HCT VFR BLD AUTO: 32.9 % (ref 42–52)
HGB BLD-MCNC: 11.2 G/DL (ref 14–18)
IRON SATN MFR SERPL: 16 % (ref 15–55)
IRON SERPL-MCNC: 21 UG/DL (ref 50–180)
LG PLATELETS BLD QL SMEAR: NORMAL
LYMPHOCYTES # BLD AUTO: 0.75 K/UL (ref 1–4.8)
LYMPHOCYTES NFR BLD: 7 % (ref 22–41)
MAGNESIUM SERPL-MCNC: 1.6 MG/DL (ref 1.5–2.5)
MANUAL DIFF BLD: NORMAL
MCH RBC QN AUTO: 29.4 PG (ref 27–33)
MCHC RBC AUTO-ENTMCNC: 34 G/DL (ref 33.7–35.3)
MCV RBC AUTO: 86.4 FL (ref 81.4–97.8)
MONOCYTES # BLD AUTO: 0.46 K/UL (ref 0–0.85)
MONOCYTES NFR BLD AUTO: 4.3 % (ref 0–13.4)
MORPHOLOGY BLD-IMP: NORMAL
NEUTROPHILS # BLD AUTO: 9.21 K/UL (ref 1.82–7.42)
NEUTROPHILS NFR BLD: 86.1 % (ref 44–72)
NRBC # BLD AUTO: 0 K/UL
NRBC BLD-RTO: 0 /100 WBC
PLATELET # BLD AUTO: 247 K/UL (ref 164–446)
PLATELET BLD QL SMEAR: NORMAL
PMV BLD AUTO: 9.7 FL (ref 9–12.9)
POLYCHROMASIA BLD QL SMEAR: NORMAL
POTASSIUM SERPL-SCNC: 2.9 MMOL/L (ref 3.6–5.5)
POTASSIUM SERPL-SCNC: 3.3 MMOL/L (ref 3.6–5.5)
RBC # BLD AUTO: 3.81 M/UL (ref 4.7–6.1)
RBC BLD AUTO: PRESENT
SIGNIFICANT IND 70042: ABNORMAL
SITE SITE: ABNORMAL
SODIUM SERPL-SCNC: 133 MMOL/L (ref 135–145)
SOURCE SOURCE: ABNORMAL
TIBC SERPL-MCNC: 130 UG/DL (ref 250–450)
TOXIC GRANULES BLD QL SMEAR: SLIGHT
VANCOMYCIN TROUGH SERPL-MCNC: 9.3 UG/ML (ref 10–20)
WBC # BLD AUTO: 10.7 K/UL (ref 4.8–10.8)

## 2018-01-31 PROCEDURE — 83550 IRON BINDING TEST: CPT

## 2018-01-31 PROCEDURE — 700102 HCHG RX REV CODE 250 W/ 637 OVERRIDE(OP): Performed by: INTERNAL MEDICINE

## 2018-01-31 PROCEDURE — A9270 NON-COVERED ITEM OR SERVICE: HCPCS | Performed by: INTERNAL MEDICINE

## 2018-01-31 PROCEDURE — 160048 HCHG OR STATISTICAL LEVEL 1-5: Performed by: INTERNAL MEDICINE

## 2018-01-31 PROCEDURE — 80202 ASSAY OF VANCOMYCIN: CPT

## 2018-01-31 PROCEDURE — 0BD48ZX EXTRACTION OF RIGHT UPPER LOBE BRONCHUS, VIA NATURAL OR ARTIFICIAL OPENING ENDOSCOPIC, DIAGNOSTIC: ICD-10-PCS | Performed by: INTERNAL MEDICINE

## 2018-01-31 PROCEDURE — 700105 HCHG RX REV CODE 258: Performed by: INTERNAL MEDICINE

## 2018-01-31 PROCEDURE — 700111 HCHG RX REV CODE 636 W/ 250 OVERRIDE (IP): Performed by: INTERNAL MEDICINE

## 2018-01-31 PROCEDURE — 85027 COMPLETE CBC AUTOMATED: CPT

## 2018-01-31 PROCEDURE — 88112 CYTOPATH CELL ENHANCE TECH: CPT | Mod: 91

## 2018-01-31 PROCEDURE — 160029 HCHG SURGERY MINUTES - 1ST 30 MINS LEVEL 4: Performed by: INTERNAL MEDICINE

## 2018-01-31 PROCEDURE — 87070 CULTURE OTHR SPECIMN AEROBIC: CPT

## 2018-01-31 PROCEDURE — 87206 SMEAR FLUORESCENT/ACID STAI: CPT | Mod: 91

## 2018-01-31 PROCEDURE — 87077 CULTURE AEROBIC IDENTIFY: CPT | Mod: 91

## 2018-01-31 PROCEDURE — 160035 HCHG PACU - 1ST 60 MINS PHASE I: Performed by: INTERNAL MEDICINE

## 2018-01-31 PROCEDURE — A9270 NON-COVERED ITEM OR SERVICE: HCPCS | Performed by: HOSPITALIST

## 2018-01-31 PROCEDURE — 80048 BASIC METABOLIC PNL TOTAL CA: CPT

## 2018-01-31 PROCEDURE — 83540 ASSAY OF IRON: CPT

## 2018-01-31 PROCEDURE — 87186 SC STD MICRODIL/AGAR DIL: CPT

## 2018-01-31 PROCEDURE — 0BD88ZX EXTRACTION OF LEFT UPPER LOBE BRONCHUS, VIA NATURAL OR ARTIFICIAL OPENING ENDOSCOPIC, DIAGNOSTIC: ICD-10-PCS | Performed by: INTERNAL MEDICINE

## 2018-01-31 PROCEDURE — 700105 HCHG RX REV CODE 258: Performed by: FAMILY MEDICINE

## 2018-01-31 PROCEDURE — 83735 ASSAY OF MAGNESIUM: CPT

## 2018-01-31 PROCEDURE — 87116 MYCOBACTERIA CULTURE: CPT

## 2018-01-31 PROCEDURE — 700102 HCHG RX REV CODE 250 W/ 637 OVERRIDE(OP): Performed by: HOSPITALIST

## 2018-01-31 PROCEDURE — 160036 HCHG PACU - EA ADDL 30 MINS PHASE I: Performed by: INTERNAL MEDICINE

## 2018-01-31 PROCEDURE — 94640 AIRWAY INHALATION TREATMENT: CPT

## 2018-01-31 PROCEDURE — 82728 ASSAY OF FERRITIN: CPT

## 2018-01-31 PROCEDURE — 502573 HCHG PACK, ENT: Performed by: INTERNAL MEDICINE

## 2018-01-31 PROCEDURE — 160041 HCHG SURGERY MINUTES - EA ADDL 1 MIN LEVEL 4: Performed by: INTERNAL MEDICINE

## 2018-01-31 PROCEDURE — 700111 HCHG RX REV CODE 636 W/ 250 OVERRIDE (IP): Performed by: FAMILY MEDICINE

## 2018-01-31 PROCEDURE — 87015 SPECIMEN INFECT AGNT CONCNTJ: CPT | Mod: 91

## 2018-01-31 PROCEDURE — 302978 HCHG BRONCHOSCOPY-DIAGNOSTIC

## 2018-01-31 PROCEDURE — 160002 HCHG RECOVERY MINUTES (STAT): Performed by: INTERNAL MEDICINE

## 2018-01-31 PROCEDURE — 700101 HCHG RX REV CODE 250

## 2018-01-31 PROCEDURE — 99232 SBSQ HOSP IP/OBS MODERATE 35: CPT | Performed by: INTERNAL MEDICINE

## 2018-01-31 PROCEDURE — 87205 SMEAR GRAM STAIN: CPT

## 2018-01-31 PROCEDURE — 85007 BL SMEAR W/DIFF WBC COUNT: CPT

## 2018-01-31 PROCEDURE — 700111 HCHG RX REV CODE 636 W/ 250 OVERRIDE (IP)

## 2018-01-31 PROCEDURE — 700101 HCHG RX REV CODE 250: Performed by: FAMILY MEDICINE

## 2018-01-31 PROCEDURE — 88312 SPECIAL STAINS GROUP 1: CPT

## 2018-01-31 PROCEDURE — 0BDG8ZX EXTRACTION OF LEFT UPPER LUNG LOBE, VIA NATURAL OR ARTIFICIAL OPENING ENDOSCOPIC, DIAGNOSTIC: ICD-10-PCS | Performed by: INTERNAL MEDICINE

## 2018-01-31 PROCEDURE — 84132 ASSAY OF SERUM POTASSIUM: CPT

## 2018-01-31 PROCEDURE — 94760 N-INVAS EAR/PLS OXIMETRY 1: CPT

## 2018-01-31 PROCEDURE — 0B9G8ZX DRAINAGE OF LEFT UPPER LUNG LOBE, VIA NATURAL OR ARTIFICIAL OPENING ENDOSCOPIC, DIAGNOSTIC: ICD-10-PCS | Performed by: INTERNAL MEDICINE

## 2018-01-31 PROCEDURE — A9270 NON-COVERED ITEM OR SERVICE: HCPCS

## 2018-01-31 PROCEDURE — 160009 HCHG ANES TIME/MIN: Performed by: INTERNAL MEDICINE

## 2018-01-31 PROCEDURE — 88104 CYTOPATH FL NONGYN SMEARS: CPT

## 2018-01-31 PROCEDURE — 770020 HCHG ROOM/CARE - TELE (206)

## 2018-01-31 PROCEDURE — 36415 COLL VENOUS BLD VENIPUNCTURE: CPT

## 2018-01-31 PROCEDURE — 88305 TISSUE EXAM BY PATHOLOGIST: CPT

## 2018-01-31 PROCEDURE — 87102 FUNGUS ISOLATION CULTURE: CPT

## 2018-01-31 PROCEDURE — 82962 GLUCOSE BLOOD TEST: CPT | Mod: 91

## 2018-01-31 PROCEDURE — 700102 HCHG RX REV CODE 250 W/ 637 OVERRIDE(OP)

## 2018-01-31 PROCEDURE — 0B9C8ZX DRAINAGE OF RIGHT UPPER LUNG LOBE, VIA NATURAL OR ARTIFICIAL OPENING ENDOSCOPIC, DIAGNOSTIC: ICD-10-PCS | Performed by: INTERNAL MEDICINE

## 2018-01-31 RX ORDER — OXYCODONE HCL 5 MG/5 ML
SOLUTION, ORAL ORAL
Status: COMPLETED
Start: 2018-01-31 | End: 2018-01-31

## 2018-01-31 RX ORDER — POTASSIUM CHLORIDE 20 MEQ/1
40 TABLET, EXTENDED RELEASE ORAL ONCE
Status: DISCONTINUED | OUTPATIENT
Start: 2018-01-31 | End: 2018-01-31

## 2018-01-31 RX ORDER — SODIUM CHLORIDE 9 MG/ML
INJECTION, SOLUTION INTRAVENOUS CONTINUOUS
Status: DISCONTINUED | OUTPATIENT
Start: 2018-01-31 | End: 2018-02-09 | Stop reason: HOSPADM

## 2018-01-31 RX ADMIN — OXYCODONE HYDROCHLORIDE 5 MG: 5 TABLET ORAL at 22:13

## 2018-01-31 RX ADMIN — OXYCODONE HYDROCHLORIDE 5 MG: 5 SOLUTION ORAL at 17:45

## 2018-01-31 RX ADMIN — INSULIN HUMAN 3 UNITS: 100 INJECTION, SOLUTION PARENTERAL at 08:11

## 2018-01-31 RX ADMIN — IPRATROPIUM BROMIDE AND ALBUTEROL SULFATE 3 ML: .5; 3 SOLUTION RESPIRATORY (INHALATION) at 03:49

## 2018-01-31 RX ADMIN — VORICONAZOLE 200 MG: 200 TABLET, FILM COATED ORAL at 22:01

## 2018-01-31 RX ADMIN — ALBUTEROL SULFATE 5 ML: 2.5 SOLUTION RESPIRATORY (INHALATION) at 16:35

## 2018-01-31 RX ADMIN — DOXYCYCLINE 100 MG: 100 TABLET ORAL at 08:11

## 2018-01-31 RX ADMIN — VANCOMYCIN HYDROCHLORIDE 1500 MG: 100 INJECTION, POWDER, LYOPHILIZED, FOR SOLUTION INTRAVENOUS at 05:37

## 2018-01-31 RX ADMIN — INSULIN GLARGINE 8 UNITS: 100 INJECTION, SOLUTION SUBCUTANEOUS at 21:49

## 2018-01-31 RX ADMIN — POTASSIUM CHLORIDE 40 MEQ: 2 INJECTION, SOLUTION, CONCENTRATE INTRAVENOUS at 08:13

## 2018-01-31 RX ADMIN — SODIUM CHLORIDE: 900 INJECTION INTRAVENOUS at 14:02

## 2018-01-31 RX ADMIN — INSULIN HUMAN 3 UNITS: 100 INJECTION, SOLUTION PARENTERAL at 21:50

## 2018-01-31 RX ADMIN — VORICONAZOLE 200 MG: 200 TABLET, FILM COATED ORAL at 08:11

## 2018-01-31 RX ADMIN — AMPICILLIN SODIUM AND SULBACTAM SODIUM 3 G: 2; 1 INJECTION, POWDER, FOR SOLUTION INTRAMUSCULAR; INTRAVENOUS at 21:40

## 2018-01-31 RX ADMIN — INSULIN HUMAN 3 UNITS: 100 INJECTION, SOLUTION PARENTERAL at 13:07

## 2018-01-31 RX ADMIN — STANDARDIZED SENNA CONCENTRATE AND DOCUSATE SODIUM 2 TABLET: 8.6; 5 TABLET, FILM COATED ORAL at 08:11

## 2018-01-31 ASSESSMENT — PAIN SCALES - GENERAL
PAINLEVEL_OUTOF10: 5
PAINLEVEL_OUTOF10: 0
PAINLEVEL_OUTOF10: 5
PAINLEVEL_OUTOF10: 0
PAINLEVEL_OUTOF10: 0
PAINLEVEL_OUTOF10: 7
PAINLEVEL_OUTOF10: 5
PAINLEVEL_OUTOF10: 2
PAINLEVEL_OUTOF10: 0

## 2018-01-31 ASSESSMENT — ENCOUNTER SYMPTOMS
FEVER: 0
SHORTNESS OF BREATH: 0
HEADACHES: 0
BLURRED VISION: 0
NAUSEA: 0
HEARTBURN: 0
VOMITING: 0
DIARRHEA: 0
MYALGIAS: 0
HEMOPTYSIS: 0
BRUISES/BLEEDS EASILY: 0
PALPITATIONS: 0
COUGH: 1
DOUBLE VISION: 0
FEVER: 1
CHILLS: 0
ABDOMINAL PAIN: 0
DIZZINESS: 0
SPUTUM PRODUCTION: 1
WHEEZING: 0
DEPRESSION: 0
NECK PAIN: 0

## 2018-01-31 NOTE — PROGRESS NOTES
Pulmonary Critical Care Progress Note        Chief Complaint: Inpatient follow up for productive cough, fever    History of Present Illness: 57 y.o. male with history of DM type 2 presented to the ER as a transfer from [place]. He had symptoms of productive cough for 02 weeks duration accompanied by fever and sweating off and on. Expectoration is greenish brown. No hemoptysis.   Patient is a  and reports being exposed to sick contacts on a regular basis. He also has environmental exposure to dust, smoke and chemical toxins. He reports history of pneumonia twice previously. CT chest shows bilateral upper lobe consolidation with necrotic cavitary lesion and bilateral lower lobe patchy densities. Currently being treated empirically with antibiotics and antifungals. He hypokalemia and hyponatremia and hyperglycemia.    ROS: Constitutional: Fever off and on, with sweating, generalized weakness: Respiratory: Dyspnea, productive cough, no pleuritic chest pain. No wheeze, Cardiac: Denies palpitations, angina, orthopnea or paroxysmal nocturnal dyspnea , GI: No nausea, vomiting abdominal pain.  All other systems negative. Neuro: Denies headache, dizziness or seizures. Gu: No hematuria or dysuria, Musculoskeletal: No joint inflammation or arthralgia.    Interval Events:  1/30/18: Hyperglycemia has improved. Frequency of cough decreased with copious expectoration. Hydration status has improved. Normal appetite. No dysuria    1/31/17: No new complaints, however hypokalemia has worsened but hyponatremia has improved. Patient is NPO for bronchoscopy today. Will replace potassium and then proceed with bronchoscopy. Blood glucose levels improved. Afebrile. Has productive cough but no chest pain.    PFSH:  No change.    Respiratory:     Pulse Oximetry: 91 % on O2 supplementation        Exam: Not in respiratory distress  Accessory muscles of respiration are not prominent  Bilateral breath sounds audible with no wheeze or  crepitations. No pleural rub.  Imaging: CT chest personally reviewed.  Bilateral upper lobe cavitary lesions thick walled with surrounding consolidation.  RML,RLL and LLL patchy infiltrates. No effusion    HemoDynamics:  Pulse: (!) 101 (Rn notified)  Blood Pressure: 134/65       Exam: First and second heart sounds audible, tachycardia present. No murmur.        Neuro:  GCS      Exam: Alert and orientated to time place and person. No focal neurological deficit    Fluids:  Intake/Output       01/29/18 0700 - 01/30/18 0659 01/30/18 0700 - 01/31/18 0659 01/31/18 0700 - 02/01/18 0659      2320-0683 0107-5651 Total 8092-9690 4590-5466 Total 4814-4519 2288-3096 Total       Intake    P.O.  --  -- --  --  0 0  --  -- --    P.O. -- -- -- -- 0 0 -- -- --    I.V.  --  1300 1300  --  -- --  --  -- --    IV Piggyback Volume -- 300 300 -- -- -- -- -- --    IV Volume -- 1000 1000 -- -- -- -- -- --    Total Intake -- 1300 1300 -- 0 0 -- -- --       Output    Urine  --  1525 1525  --  300 300  --  -- --    Void (ml) -- 1525 1525 -- 300 300 -- -- --    Total Output -- 1525 1525 -- 300 300 -- -- --       Net I/O     -- -225 -225 -- -300 -300 -- -- --           Recent Labs      01/28/18 1909 01/28/18 2330 01/31/18   0423   SODIUM  126*  128*  133*   POTASSIUM  3.7  3.3*  2.9*   CHLORIDE  95*  96  101   CO2  14*  17*  21   BUN  27*  27*  11   CREATININE  1.10  0.97  0.49*   CALCIUM  8.5  7.7*  8.2*       GI/Nutrition:  Exam: Oral mucosa is moist.   Abdomen is soft, non tender. Bowel sounds audible.    Liver Function  Recent Labs      01/28/18 1909 01/28/18   2330  01/31/18   0423   ALTSGPT  8  7   --    ASTSGOT  9*  11*   --    ALKPHOSPHAT  88  74   --    TBILIRUBIN  0.3  0.3   --    GLUCOSE  409*  290*  212*       Heme:  Recent Labs      01/28/18   2330  01/30/18   0135  01/31/18 0423   RBC  3.78*   --   3.81*   HEMOGLOBIN  10.8*   --   11.2*   HEMATOCRIT  32.7*   --   32.9*   PLATELETCT  238   --   247   PROTHROMBTM   --    14.8*   --    APTT   --   39.1*   --    INR   --   1.19*   --        Infectious Disease:  Temp  Av.8 °C (98.2 °F)  Min: 36.1 °C (96.9 °F)  Max: 37.6 °C (99.6 °F)  Micro: Blood cultures no growth yet  Sputum Gram stain: Many WBCs and Gram positive cocci  Sputum culture: Staphylococcus     Recent Labs      18   1909  18   2330  18   0423   WBC   --   8.8  10.7   NEUTSPOLYS   --   69.60  86.10*   LYMPHOCYTES   --   17.40*  7.00*   MONOCYTES   --   9.50  4.30   EOSINOPHILS   --   0.90  1.70   BASOPHILS   --   1.70  0.90   ASTSGOT  9*  11*   --    ALTSGPT  8  7   --    ALKPHOSPHAT  88  74   --    TBILIRUBIN  0.3  0.3   --      Current Facility-Administered Medications   Medication Dose Frequency Provider Last Rate Last Dose   • potassium chloride 40 mEq in  mL IVPB  40 mEq Once Muhammad K Gondal, M.D.       • insulin glargine (LANTUS) injection 8 Units  8 Units Q EVENING Lilli Marinelli D.ADRI   8 Units at 18 1950   • mag hydrox-al hydrox-simeth (MAALOX PLUS ES or MYLANTA DS) suspension 10 mL  10 mL 4X/DAY PRN Lito Giraldo M.D.   10 mL at 18 1251   • doxycycline monohydrate (ADOXA) tablet 100 mg  100 mg Q12HRS Re Bermudez M.D.   100 mg at 18 1940   • MD ALERT... vancomycin per pharmacy protocol   pharmacy to dose Jaswant Obrien M.D.       • vancomycin 1,500 mg in  mL IVPB  17 mg/kg Q12HR Lito Giraldo M.D. 125 mL/hr at 18 0537 1,500 mg at 18 0537   • ipratropium-albuterol (DUONEB) nebulizer solution 3 mL  3 mL Q4H PRN (RT) Lito Giraldo M.D.   3 mL at 18 0349   • senna-docusate (PERICOLACE or SENOKOT S) 8.6-50 MG per tablet 2 Tab  2 Tab BID Brandon Mota M.D.        And   • polyethylene glycol/lytes (MIRALAX) PACKET 1 Packet  1 Packet QDAY PRN Brandon Mota M.D.        And   • magnesium hydroxide (MILK OF MAGNESIA) suspension 30 mL  30 mL QDAY PRN Brandon Mota M.D.        And   • bisacodyl (DULCOLAX)  suppository 10 mg  10 mg QDAY PRN Brandon Mota M.D.       • Respiratory Care per Protocol   Continuous RT Brandon Mota M.D.       • acetaminophen (TYLENOL) tablet 650 mg  650 mg Q6HRS PRN Brandon Mota M.D.       • Pharmacy Consult Request ...Pain Management Review   PRN Brandon Mota M.D.        And   • oxycodone immediate-release (ROXICODONE) tablet 2.5 mg  2.5 mg Q3HRS PRN Brandon Mota M.D.   2.5 mg at 01/29/18 1810    And   • oxycodone immediate-release (ROXICODONE) tablet 5 mg  5 mg Q3HRS PRLULU Mota M.D.   5 mg at 01/30/18 1727    And   • morphine (pf) 4 mg/ml injection 2 mg  2 mg Q3HRS PRLULU Mota M.D.       • insulin regular (HUMULIN R) injection 2-9 Units  2-9 Units 4X/DAY ACHDIMITRI Mota M.D.   9 Units at 01/30/18 1949    And   • glucose 4 g chewable tablet 16 g  16 g Q15 MIN PRLULU Mota M.D.        And   • dextrose 50% (D50W) injection 25 mL  25 mL Q15 MIN PRLULU Mota M.D.       • ondansetron (ZOFRAN) syringe/vial injection 4 mg  4 mg Q4HRS PRLULU Mota M.D.       • ondansetron (ZOFRAN ODT) dispertab 4 mg  4 mg Q4HRS PRLULU Mota M.D.       • promethazine (PHENERGAN) tablet 12.5-25 mg  12.5-25 mg Q4HRS PRLULU Mota M.D.       • promethazine (PHENERGAN) suppository 12.5-25 mg  12.5-25 mg Q4HRS PRLULU Mota M.D.       • prochlorperazine (COMPAZINE) injection 5-10 mg  5-10 mg Q4HRS PRLULU Mota M.D.       • zolpidem (AMBIEN) tablet 5 mg  5 mg HS PRN - MR X 1 Brandon Mota M.D.       • ibuprofen (MOTRIN) tablet 400 mg  400 mg Q6HRS PRN Brandon Mota M.D.       • guaiFENesin (ROBITUSSIN) 100 MG/5ML solution 200 mg  10 mL Q4HRS PRN Brandon Mota M.D.   200 mg at 01/30/18 0314   • voriconazole (VFEND) tablet 200 mg  200 mg Q12HRS Donald Ga M.D.   200 mg at 01/30/18 1940     Last reviewed on 1/29/2018 12:39 PM by Aminata Jamil,  R.N.    Quality  Measures:   Labs reviewed, Medications reviewed and Radiology images reviewed   Mei catheter: No Mei       DVT Prophylaxis: Enoxaparin (Lovenox)            Problems:  Necrotizing pneumonia bilateral upper lobes. Etiology not determined yet  Hyponatremia improved, likely sec to hyperglycemia and SIADH not excluded yet  Hypokalemia  Diabetes Mellitus type 2 uncontrolled      Plan:  Supplement KCl 40 meq in 500 NS @ 125 ml /hr, then repeat Serum potassium  Bronchoscopy with BAL and TBBx today under moderate sedation  Patient is already NPO  lovenox is already on hold  Continue vancomycin and voriconazole and bronchodilators.  Continue oxygen supplementation and titrate to maintain SpO2 above 94%.  Management plan discussed with patient and he agrees with plan.  Incentive spirometry

## 2018-01-31 NOTE — PROGRESS NOTES
Infectious Disease Progress Note    Author: Re Bermudez M.D. Date & Time of service: 2018  2:08 PM    Chief Complaint:  Cavitary lung lesions      Interval History:  57-year-old diabetic  admitted due to worsening cough, found to have CT with bilateral upper lobe   cavitary lesions   Temp 100.8, sputum +SA Feels much better today   AF WBC 10.9 feels better-awaiting bronch  Labs Reviewed, Medications Reviewed and Radiology Reviewed.    Review of Systems:  Review of Systems   Constitutional: Positive for fever. Negative for chills.   Respiratory: Positive for cough and sputum production. Negative for hemoptysis, shortness of breath and wheezing.    Cardiovascular: Negative for chest pain.   Gastrointestinal: Negative for abdominal pain, diarrhea, nausea and vomiting.   All other systems reviewed and are negative.      Hemodynamics:  Temp (24hrs), Av.7 °C (98.1 °F), Min:36.1 °C (96.9 °F), Max:37.6 °C (99.6 °F)  Temperature: 36.4 °C (97.5 °F)  Pulse  Av.4  Min: 82  Max: 113   Blood Pressure: 110/63       Physical Exam:  Physical Exam   Constitutional: He is oriented to person, place, and time. He appears well-developed. No distress.   HENT:   Head: Normocephalic and atraumatic.   Eyes: EOM are normal. Pupils are equal, round, and reactive to light.   Neck: Neck supple.   Cardiovascular: Normal rate.    No murmur heard.  Pulmonary/Chest: Effort normal. No respiratory distress. He has no wheezes. He has rales.   Abdominal: Soft. He exhibits no distension. There is no tenderness. There is no rebound.   Musculoskeletal: He exhibits no edema.   Neurological: He is alert and oriented to person, place, and time.   Skin: No rash noted.   Nursing note and vitals reviewed.      Meds:    Current Facility-Administered Medications:   •  vancomycin  •  insulin glargine  •  mag hydrox-al hydrox-simeth  •  doxycycline monohydrate  •  MD ALERT... vancomycin  •  ipratropium-albuterol  •   senna-docusate **AND** polyethylene glycol/lytes **AND** magnesium hydroxide **AND** bisacodyl  •  Respiratory Care per Protocol  •  acetaminophen  •  Notify provider if pain remains uncontrolled **AND** Use the numeric rating scale (NRS-11) on regular floors and Critical-Care Pain Observation Tool (CPOT) on ICUs/Trauma to assess pain **AND** Pulse Ox (Oximetry) **AND** Pharmacy Consult Request **AND** If patient difficult to arouse and/or has respiratory depression, stop any opiates that are currently infusing and call a Rapid Response. **AND** oxyCODONE immediate-release **AND** oxyCODONE immediate-release **AND** morphine injection  •  insulin regular **AND** Accu-Chek ACHS **AND** NOTIFY MD and PharmD **AND** glucose 4 g **AND** dextrose 50%  •  ondansetron  •  ondansetron  •  promethazine  •  promethazine  •  prochlorperazine  •  zolpidem  •  ibuprofen  •  guaiFENesin  •  voriconazole    Labs:  Recent Labs      01/28/18 2330 01/31/18   0423   WBC  8.8  10.7   RBC  3.78*  3.81*   HEMOGLOBIN  10.8*  11.2*   HEMATOCRIT  32.7*  32.9*   MCV  86.5  86.4   MCH  28.6  29.4   RDW  41.4  41.6   PLATELETCT  238  247   MPV  10.0  9.7   NEUTSPOLYS  69.60  86.10*   LYMPHOCYTES  17.40*  7.00*   MONOCYTES  9.50  4.30   EOSINOPHILS  0.90  1.70   BASOPHILS  1.70  0.90   RBCMORPHOLO  Normal  Present     Recent Labs      01/28/18 1909 01/28/18 2330 01/31/18   0423   SODIUM  126*  128*  133*   POTASSIUM  3.7  3.3*  2.9*   CHLORIDE  95*  96  101   CO2  14*  17*  21   GLUCOSE  409*  290*  212*   BUN  27*  27*  11     Recent Labs      01/28/18 1909 01/28/18 2330 01/31/18   0423   ALBUMIN  2.3*  2.6*   --    TBILIRUBIN  0.3  0.3   --    ALKPHOSPHAT  88  74   --    TOTPROTEIN  5.7*  5.4*   --    ALTSGPT  8  7   --    ASTSGOT  9*  11*   --    CREATININE  1.10  0.97  0.49*       Imaging:  No results found.    Micro:  Results     Procedure Component Value Units Date/Time    Culture Respiratory W/ GRM [facility] [[id number]]   (Abnormal)  (Susceptibility) Collected:  01/29/18 0230    Order Status:  Completed Specimen:  Respirate from Sputum Updated:  01/31/18 0746     Gram Stain Result --     Many WBCs.  Few epithelial cells.  Many Gram positive cocci.  Specimen Quality Score: 2+       Significant Indicator POS (POS)     Source RESP     Site SPUTUM     Respiratory Culture -- (A)     Respiratory Culture -- (A)     Staphylococcus aureus  Heavy growth      Narrative:       Collected By:ANTONIA MILLER  Preferably before first antibiotic dose - add Gram stain if  indicated    Culture & Susceptibility     STAPHYLOCOCCUS AUREUS     Antibiotic Sensitivity Microscan Unit Status    Ampicillin/sulbactam Sensitive <=8/4 mcg/mL Final    Clindamycin Sensitive <=0.5 mcg/mL Final    Daptomycin Sensitive 1 mcg/mL Final    Erythromycin Sensitive <=0.5 mcg/mL Final    Moxifloxacin Sensitive <=0.5 mcg/mL Final    Oxacillin Sensitive <=0.25 mcg/mL Final    Tetracycline Sensitive <=4 mcg/mL Final    Trimeth/Sulfa Sensitive <=0.5/9.5 mcg/mL Final    Vancomycin Sensitive 2 mcg/mL Final                       MRSA BY PCR (AMP) [[id number]]     Order Status:  No result Specimen:  Respirate from Nares     GRAM STAIN [[id number]] Collected:  01/29/18 0230    Order Status:  Completed Specimen:  Respirate Updated:  01/29/18 1310     Significant Indicator .     Source RESP     Site SPUTUM     Gram Stain Result --     Many WBCs.  Few epithelial cells.  Many Gram positive cocci.  Specimen Quality Score: 2+      Narrative:       Collected By:ANTONIA MILLER  Preferably before first antibiotic dose - add Gram stain if  indicated    BLOOD CULTURE [[id number]] Collected:  01/28/18 1908    Order Status:  Completed Specimen:  Blood from Peripheral Updated:  01/29/18 0840     Significant Indicator NEG     Source BLD     Site PERIPHERAL     Blood Culture --     No Growth    Note: Blood cultures are incubated for 5 days and  are monitored continuously.Positive  "blood cultures  are called to the RN and reported as soon as  they are identified.      Narrative:       Per Hospital Policy: Only change Specimen Src: to \"Line\" if  specified by physician order.    BLOOD CULTURE [[id number]] Collected:  01/28/18 1908    Order Status:  Completed Specimen:  Blood from Peripheral Updated:  01/29/18 0840     Significant Indicator NEG     Source BLD     Site PERIPHERAL     Blood Culture --     No Growth    Note: Blood cultures are incubated for 5 days and  are monitored continuously.Positive blood cultures  are called to the RN and reported as soon as  they are identified.      Narrative:       Per Hospital Policy: Only change Specimen Src: to \"Line\" if  specified by physician order.          Assessment:  Active Hospital Problems    Diagnosis   • *Pneumonia [J18.9]   • Cavitary lesion of lung [J98.4]   • Hyponatremia [E87.1]   • Hypokalemia [E87.6]   • Hypocalcemia [E83.51]   • Hypoalbuminemia [E88.09]   • Normocytic anemia [D64.9]   • Type II diabetes mellitus (CMS-HCC) [E11.9]       Plan:  Cavitary lung lesions, additional work  Low grade fever  No leukocytosis  Blood cxs neg  HIV neg  Sputum +MSSA  Bronch planned for today  May need JOI  DC vanco and doxy  Start Unasyn  Cocci serology pending-continue vori    Diabetes, poorly controlled  Keep BS under 150 to help control current infection  Hegb A1C 12.5    Discussed with internal medicine Dr Marinelli  "

## 2018-01-31 NOTE — PROGRESS NOTES
Received report, assumed pt care. Pt a&o 4, VSS, assessment completed. Resting comfortably in bed with call light, bedside table in reach. No c/o at this time. Side rails up 2. Instructed to use call light when needing to get OOB verbalized understanding. Bed in low position. Will continue to monitor.

## 2018-01-31 NOTE — PROGRESS NOTES
Received report from day RN. Assumed pt care at 1900. Discussed call light/phone system, communication board and POC. This RN provides education regarding meds, infection prevention, and treatment plan. Pt is aao x 4 and verbalizes understanding. Pt states he is feeling better today. This RN provides partial bed change. Pt mobility assessed. Pt is upself and steady. Pt calls appropriately. Fall precautions in place. Bed is locked and in low position, call light within reach. Treaded slippers in place. Pt needs met at this time. Hourly rounding in place.

## 2018-01-31 NOTE — PROGRESS NOTES
"Pharmacy Kinetics 57 y.o. male on vancomycin day # 3   2018    Currently on Vancomycin 1,700mg iv q8hr (0700, 1500, 2300)    Indication for Treatment: Pneumonia      Pertinent history per medical record: Admitted on 2018 for fever/chills, N/V, cough (+ green sputum) with abnormal chest CT at outlying facility - diagnosed with bilateral cavitary lesions with URI concerning for possible atypical pneumonia. ID and pulmonary specialist consulted.      Other antibiotics: Doxycycline and Voriconazole      Allergies: Patient has no known allergies.      List concerns for renal function: BUN/SCr ratio > 20:1, Hypoalbuminemia, Obesity (BMI 30), Anion gap metabolic acidosis, uncontrolled diabetes      Pertinent cultures to date:   : PBCs x 2 = NGTD   : Sputum Cx = MSSA     Recent Labs      18   2330  18   0423   WBC  8.8  10.7   NEUTSPOLYS  69.60  86.10*   BANDSSTABS  0.90   --      Recent Labs      18   1909  18   2330  18   0423   BUN  27*  27*  11   CREATININE  1.10  0.97  0.49*   ALBUMIN  2.3*  2.6*   --      Recent Labs      18   0423   VANCOTROUGH  9.3*     Intake/Output Summary (Last 24 hours) at 18 0837  Last data filed at 18 0400   Gross per 24 hour   Intake                0 ml   Output              300 ml   Net             -300 ml      Blood pressure 117/57, pulse 91, temperature 36.7 °C (98.1 °F), resp. rate 18, height 1.702 m (5' 7\"), weight 87.8 kg (193 lb 9 oz), SpO2 93 %. Temp (24hrs), Av.8 °C (98.3 °F), Min:36.1 °C (96.9 °F), Max:37.6 °C (99.6 °F)      A/P   1. Vancomycin dose change: Increased dosing regimen today ()   2. Next vancomycin level: ~ 2 days (not yet ordered)   3. Goal trough: 16 - 20 mcg/mL   Comments: Trough level resulted this morning at 9.3 mcg/mL, very close to true trough level, is below desired range as outlined above. Increased dosing regimen from 1,500mg IV q12h to 1,700mg IV q8h based on linear kinetics of " vancomycin. Trough demonstrates adequate clearance of vancomycin, however, patient remains at risk for accumulation of drug as therapy continues, will need to continue to monitor trough levels closely following dosage increase - increased frequency of vancomycin will also have a more profound effect on trough level versus dosage increases alone. Vitals are stable, Tmax 99.6 F (over 24 hours) with increasing leukocytosis. Renal indices are stable, although likely a falsely low SCr (hypoalbuminemia). Cultures positive with MSSA. Anticipate de-escalation.     Day Spears, JensenD

## 2018-01-31 NOTE — PROGRESS NOTES
During VS check. Pt desats to 88 % O2. This RN places NC and increases O2 to 4 L in order to achieve 95% O2 sat. This RN will pass on to day shift RN.

## 2018-01-31 NOTE — CARE PLAN
Problem: Infection  Goal: Will remain free from infection  This RN administers IV abx and PO abx as scheduled by MD.    Problem: Respiratory:  Goal: Respiratory status will improve  Pt was weaned off O2 during day shift. Pt is performing coughing and deep breathing exercises with IS. Pt is self-motivated and inspires air to 2000 ml.

## 2018-01-31 NOTE — PROGRESS NOTES
[facility] Hospitalist Progress Note    Date of Service: 2018    Chief Complaint  57 y.o. male admitted 2018 with cough and shortness of breath. Was found to have bilateral pneumonia and cavitary lesions.    Interval Problem Update  : Unable to get bronch, tolerating abx/vori.  Tolerating diet.  A1C >12, discussed likely long term insulin until weight loss    Consultants/Specialty  ID- discussed with ID  Pulmonary medicine    Disposition  Bronch in AM  Continue abx        Review of Systems   Constitutional: Negative for chills and fever.   HENT: Negative for hearing loss and tinnitus.    Eyes: Negative for blurred vision and double vision.   Respiratory: Positive for cough (Worse in AM, cleared in afternoon). Negative for hemoptysis.    Cardiovascular: Negative for chest pain and palpitations.   Gastrointestinal: Negative for heartburn and nausea.   Genitourinary: Negative for dysuria and urgency.   Musculoskeletal: Negative for myalgias and neck pain.   Skin: Negative for rash.   Neurological: Negative for dizziness and headaches.   Endo/Heme/Allergies: Does not bruise/bleed easily.   Psychiatric/Behavioral: Negative for depression and suicidal ideas.      Physical Exam  Laboratory/Imaging   Hemodynamics  Temp (24hrs), Av.8 °C (98.3 °F), Min:36.4 °C (97.5 °F), Max:37.6 °C (99.6 °F)   Temperature: 37.6 °C (99.6 °F)  Pulse  Av.4  Min: 85  Max: 113    Blood Pressure: 110/53      Respiratory      Respiration: 19, Pulse Oximetry: 91 %, O2 Daily Delivery Respiratory : Room Air with O2 Available     Given By:: Mask  RUL Breath Sounds: Clear, RML Breath Sounds: Diminished, RLL Breath Sounds: Diminished, JEANE Breath Sounds: Clear, LLL Breath Sounds: Diminished    Fluids    Intake/Output Summary (Last 24 hours) at 18 1901  Last data filed at 18 0600   Gross per 24 hour   Intake             1300 ml   Output             1525 ml   Net             -225 ml       Nutrition  Orders Placed This  Encounter   Procedures   • Diet Order     Standing Status:   Standing     Number of Occurrences:   1     Order Specific Question:   Diet:     Answer:   Diabetic [3]   • DIET NPO     Standing Status:   Standing     Number of Occurrences:   1     Order Specific Question:   Restrict to:     Answer:   Ice Chips [2]     Comments:   for bronchoscopy in am     Physical Exam   Constitutional: He is oriented to person, place, and time. He appears well-developed. No distress.   HENT:   Head: Normocephalic and atraumatic.   Eyes: Pupils are equal, round, and reactive to light. Left eye exhibits no discharge.   Neck: Normal range of motion. No thyromegaly present.   Cardiovascular: Normal rate and regular rhythm.  Exam reveals no friction rub.    Pulmonary/Chest: Effort normal. He has decreased breath sounds. He has no wheezes.   Decreased breath sounds bilateral upper lobes   Abdominal: Soft. He exhibits no distension.   Musculoskeletal: Normal range of motion. He exhibits no edema.   Neurological: He is alert and oriented to person, place, and time.   Skin: Skin is warm. No erythema.   Psychiatric: He has a normal mood and affect. His behavior is normal.   Very pleasant       Recent Labs      01/28/18   2330   WBC  8.8   RBC  3.78*   HEMOGLOBIN  10.8*   HEMATOCRIT  32.7*   MCV  86.5   MCH  28.6   MCHC  33.0*   RDW  41.4   PLATELETCT  238   MPV  10.0     Recent Labs      01/28/18   1909  01/28/18   2330   SODIUM  126*  128*   POTASSIUM  3.7  3.3*   CHLORIDE  95*  96   CO2  14*  17*   GLUCOSE  409*  290*   BUN  27*  27*   CREATININE  1.10  0.97   CALCIUM  8.5  7.7*     Recent Labs      01/30/18   0135   APTT  39.1*   INR  1.19*         Recent Labs      01/28/18   2330   TRIGLYCERIDE  83   HDL  17*   LDL  44          Assessment/Plan     * Pneumonia   Assessment & Plan    Community-acquired pneumonia. Patient was also exposed to environmental smoke during his recent time as a .   CT scan of the chest done in outside  facility showed multiple cavitary lung lesions in the upper lobes (2 lesions in the left upper lobe, 4.6 and 1.7 cm, as well as right upper lobe 6.5 cm).  Infectious diseases and pulmonary medicine consulted. On IV antibiotics. Sputum culture showed gram-positive cocci so vancomycin was added to the regimen. Patient will have diagnostic bronchoscopy to be done in the a.m.        Cavitary lesion of lung   Assessment & Plan    Bacterial versus fungal etiology. Patient is currently on broad-spectrum give Rx.   Discussed with ID  Sputum Gram stain showed gram-positive cocci.  Bronch scheduled for 1/30  Continue vanc/doxy/voriconazole        Hypoalbuminemia- (present on admission)   Assessment & Plan    Monitor        Hypocalcemia- (present on admission)   Assessment & Plan    Corrected calcium is 10.6 (normal).         Hypokalemia- (present on admission)   Assessment & Plan    Replace 40 PO  Repeat in AM        Hyponatremia- (present on admission)   Assessment & Plan    This is likely secondary to lung pathology. On IV fluids with normal saline. Continue to monitor.  Repeat in AM        Uncontrolled type II diabetes mellitus (CMS-HCC)- (present on admission)   Assessment & Plan    A1C 12.5, poorly controlled, still >250  Add lantus 8  Add SSI  Metformin        Normocytic anemia- (present on admission)   Assessment & Plan    Monitor H&H.            Reviewed items::  Labs reviewed, Radiology images reviewed and Medications reviewed  Mei catheter::  No Mei  DVT prophylaxis - mechanical:  SCDs  Antibiotics:  Treating active infection/contamination beyond 24 hours perioperative coverage

## 2018-01-31 NOTE — CARE PLAN
Problem: Knowledge Deficit  Goal: Knowledge of disease process/condition, treatment plan, diagnostic tests, and medications will improve  POC: bronchoscopy with biopsy     Problem: Respiratory:  Goal: Respiratory status will improve  RT following

## 2018-02-01 PROBLEM — J85.0 NECROTIZING PNEUMONIA (HCC): Status: ACTIVE | Noted: 2018-01-28

## 2018-02-01 PROBLEM — J96.01 ACUTE RESPIRATORY FAILURE WITH HYPOXIA (HCC): Status: ACTIVE | Noted: 2018-02-01

## 2018-02-01 LAB
ALBUMIN SERPL BCP-MCNC: 2 G/DL (ref 3.2–4.9)
ASPERGILLUS AB SER QL ID: NORMAL
ASPERGILLUS AB TITR SER CF: NORMAL {TITER}
BASOPHILS # BLD AUTO: 3.5 % (ref 0–1.8)
BASOPHILS # BLD: 0.38 K/UL (ref 0–0.12)
BUN SERPL-MCNC: 12 MG/DL (ref 8–22)
C IMMITIS IGM SPEC QL IA: 0.2 IV
CALCIUM SERPL-MCNC: 7.4 MG/DL (ref 8.5–10.5)
CHLORIDE SERPL-SCNC: 105 MMOL/L (ref 96–112)
CO2 SERPL-SCNC: 21 MMOL/L (ref 20–33)
COCCIDIOIDES AB SPEC QL ID: DETECTED
COCCIDIOIDES AB TITR SER CF: ABNORMAL {TITER}
COCCIDIOIDES IGG SPEC QL IA: 2.7 IV
CREAT SERPL-MCNC: 0.52 MG/DL (ref 0.5–1.4)
EOSINOPHIL # BLD AUTO: 0.1 K/UL (ref 0–0.51)
EOSINOPHIL NFR BLD: 0.9 % (ref 0–6.9)
ERYTHROCYTE [DISTWIDTH] IN BLOOD BY AUTOMATED COUNT: 44.7 FL (ref 35.9–50)
GLUCOSE BLD-MCNC: 193 MG/DL (ref 65–99)
GLUCOSE BLD-MCNC: 211 MG/DL (ref 65–99)
GLUCOSE BLD-MCNC: 211 MG/DL (ref 65–99)
GLUCOSE BLD-MCNC: 246 MG/DL (ref 65–99)
GLUCOSE BLD-MCNC: 276 MG/DL (ref 65–99)
GLUCOSE SERPL-MCNC: 221 MG/DL (ref 65–99)
GRAM STN SPEC: NORMAL
HCT VFR BLD AUTO: 31.2 % (ref 42–52)
HGB BLD-MCNC: 10.3 G/DL (ref 14–18)
LYMPHOCYTES # BLD AUTO: 0.96 K/UL (ref 1–4.8)
LYMPHOCYTES NFR BLD: 8.9 % (ref 22–41)
MAGNESIUM SERPL-MCNC: 1.6 MG/DL (ref 1.5–2.5)
MANUAL DIFF BLD: NORMAL
MCH RBC QN AUTO: 29.2 PG (ref 27–33)
MCHC RBC AUTO-ENTMCNC: 33 G/DL (ref 33.7–35.3)
MCV RBC AUTO: 88.4 FL (ref 81.4–97.8)
MONOCYTES # BLD AUTO: 0.38 K/UL (ref 0–0.85)
MONOCYTES NFR BLD AUTO: 3.5 % (ref 0–13.4)
MORPHOLOGY BLD-IMP: NORMAL
NEUTROPHILS # BLD AUTO: 8.99 K/UL (ref 1.82–7.42)
NEUTROPHILS NFR BLD: 83.2 % (ref 44–72)
NRBC # BLD AUTO: 0 K/UL
NRBC BLD-RTO: 0 /100 WBC
PHOSPHATE SERPL-MCNC: 2.6 MG/DL (ref 2.5–4.5)
PLATELET # BLD AUTO: 257 K/UL (ref 164–446)
PLATELET BLD QL SMEAR: NORMAL
PMV BLD AUTO: 10 FL (ref 9–12.9)
POTASSIUM SERPL-SCNC: 3 MMOL/L (ref 3.6–5.5)
PROCALCITONIN SERPL-MCNC: 0.55 NG/ML
RBC # BLD AUTO: 3.53 M/UL (ref 4.7–6.1)
RBC BLD AUTO: PRESENT
RHODAMINE-AURAMINE STN SPEC: NORMAL
SIGNIFICANT IND 70042: NORMAL
SITE SITE: NORMAL
SODIUM SERPL-SCNC: 136 MMOL/L (ref 135–145)
SOURCE SOURCE: NORMAL
TOXIC GRANULES BLD QL SMEAR: SLIGHT
WBC # BLD AUTO: 10.8 K/UL (ref 4.8–10.8)

## 2018-02-01 PROCEDURE — 85007 BL SMEAR W/DIFF WBC COUNT: CPT

## 2018-02-01 PROCEDURE — A9270 NON-COVERED ITEM OR SERVICE: HCPCS | Performed by: INTERNAL MEDICINE

## 2018-02-01 PROCEDURE — 700111 HCHG RX REV CODE 636 W/ 250 OVERRIDE (IP): Performed by: INTERNAL MEDICINE

## 2018-02-01 PROCEDURE — 700105 HCHG RX REV CODE 258: Performed by: INTERNAL MEDICINE

## 2018-02-01 PROCEDURE — 700102 HCHG RX REV CODE 250 W/ 637 OVERRIDE(OP): Performed by: HOSPITALIST

## 2018-02-01 PROCEDURE — 84145 PROCALCITONIN (PCT): CPT

## 2018-02-01 PROCEDURE — 700102 HCHG RX REV CODE 250 W/ 637 OVERRIDE(OP): Performed by: INTERNAL MEDICINE

## 2018-02-01 PROCEDURE — 36415 COLL VENOUS BLD VENIPUNCTURE: CPT

## 2018-02-01 PROCEDURE — 85027 COMPLETE CBC AUTOMATED: CPT

## 2018-02-01 PROCEDURE — 83735 ASSAY OF MAGNESIUM: CPT

## 2018-02-01 PROCEDURE — A9270 NON-COVERED ITEM OR SERVICE: HCPCS | Performed by: HOSPITALIST

## 2018-02-01 PROCEDURE — 99233 SBSQ HOSP IP/OBS HIGH 50: CPT | Performed by: INTERNAL MEDICINE

## 2018-02-01 PROCEDURE — 700101 HCHG RX REV CODE 250: Performed by: FAMILY MEDICINE

## 2018-02-01 PROCEDURE — 770020 HCHG ROOM/CARE - TELE (206)

## 2018-02-01 PROCEDURE — 80069 RENAL FUNCTION PANEL: CPT

## 2018-02-01 PROCEDURE — 82962 GLUCOSE BLOOD TEST: CPT

## 2018-02-01 RX ORDER — INSULIN GLARGINE 100 [IU]/ML
12 INJECTION, SOLUTION SUBCUTANEOUS EVERY EVENING
Status: DISCONTINUED | OUTPATIENT
Start: 2018-02-01 | End: 2018-02-02

## 2018-02-01 RX ORDER — POTASSIUM CHLORIDE 20 MEQ/1
40 TABLET, EXTENDED RELEASE ORAL 2 TIMES DAILY
Status: DISCONTINUED | OUTPATIENT
Start: 2018-02-01 | End: 2018-02-09 | Stop reason: HOSPADM

## 2018-02-01 RX ORDER — MAGNESIUM SULFATE 1 G/100ML
1 INJECTION INTRAVENOUS ONCE
Status: COMPLETED | OUTPATIENT
Start: 2018-02-01 | End: 2018-02-01

## 2018-02-01 RX ADMIN — AMPICILLIN SODIUM AND SULBACTAM SODIUM 3 G: 2; 1 INJECTION, POWDER, FOR SOLUTION INTRAMUSCULAR; INTRAVENOUS at 15:30

## 2018-02-01 RX ADMIN — INSULIN HUMAN 3 UNITS: 100 INJECTION, SOLUTION PARENTERAL at 06:33

## 2018-02-01 RX ADMIN — ENOXAPARIN SODIUM 40 MG: 100 INJECTION SUBCUTANEOUS at 10:04

## 2018-02-01 RX ADMIN — POTASSIUM CHLORIDE 40 MEQ: 1500 TABLET, EXTENDED RELEASE ORAL at 08:54

## 2018-02-01 RX ADMIN — AMPICILLIN SODIUM AND SULBACTAM SODIUM 3 G: 2; 1 INJECTION, POWDER, FOR SOLUTION INTRAMUSCULAR; INTRAVENOUS at 08:54

## 2018-02-01 RX ADMIN — OXYCODONE HYDROCHLORIDE 5 MG: 5 TABLET ORAL at 15:37

## 2018-02-01 RX ADMIN — VORICONAZOLE 200 MG: 200 TABLET, FILM COATED ORAL at 21:26

## 2018-02-01 RX ADMIN — IPRATROPIUM BROMIDE AND ALBUTEROL SULFATE 3 ML: .5; 3 SOLUTION RESPIRATORY (INHALATION) at 13:24

## 2018-02-01 RX ADMIN — INSULIN HUMAN 5 UNITS: 100 INJECTION, SOLUTION PARENTERAL at 21:31

## 2018-02-01 RX ADMIN — AMPICILLIN SODIUM AND SULBACTAM SODIUM 3 G: 2; 1 INJECTION, POWDER, FOR SOLUTION INTRAMUSCULAR; INTRAVENOUS at 05:01

## 2018-02-01 RX ADMIN — INSULIN GLARGINE 12 UNITS: 100 INJECTION, SOLUTION SUBCUTANEOUS at 21:32

## 2018-02-01 RX ADMIN — VORICONAZOLE 200 MG: 200 TABLET, FILM COATED ORAL at 10:04

## 2018-02-01 RX ADMIN — INSULIN HUMAN 2 UNITS: 100 INJECTION, SOLUTION PARENTERAL at 11:48

## 2018-02-01 RX ADMIN — MAGNESIUM SULFATE IN DEXTROSE 1 G: 10 INJECTION, SOLUTION INTRAVENOUS at 09:59

## 2018-02-01 RX ADMIN — AMPICILLIN SODIUM AND SULBACTAM SODIUM 3 G: 2; 1 INJECTION, POWDER, FOR SOLUTION INTRAMUSCULAR; INTRAVENOUS at 21:26

## 2018-02-01 RX ADMIN — IPRATROPIUM BROMIDE AND ALBUTEROL SULFATE 3 ML: .5; 3 SOLUTION RESPIRATORY (INHALATION) at 17:33

## 2018-02-01 RX ADMIN — INSULIN HUMAN 3 UNITS: 100 INJECTION, SOLUTION PARENTERAL at 17:18

## 2018-02-01 RX ADMIN — ZOLPIDEM TARTRATE 5 MG: 5 TABLET, FILM COATED ORAL at 21:40

## 2018-02-01 RX ADMIN — OXYCODONE HYDROCHLORIDE 5 MG: 5 TABLET ORAL at 23:14

## 2018-02-01 RX ADMIN — POTASSIUM CHLORIDE 40 MEQ: 1500 TABLET, EXTENDED RELEASE ORAL at 21:34

## 2018-02-01 RX ADMIN — OXYCODONE HYDROCHLORIDE 5 MG: 5 TABLET ORAL at 08:54

## 2018-02-01 ASSESSMENT — PAIN SCALES - GENERAL
PAINLEVEL_OUTOF10: 4
PAINLEVEL_OUTOF10: 7
PAINLEVEL_OUTOF10: 3
PAINLEVEL_OUTOF10: 4
PAINLEVEL_OUTOF10: 2
PAINLEVEL_OUTOF10: 4
PAINLEVEL_OUTOF10: 2
PAINLEVEL_OUTOF10: 2
PAINLEVEL_OUTOF10: 5

## 2018-02-01 ASSESSMENT — ENCOUNTER SYMPTOMS
VOMITING: 0
HEMOPTYSIS: 0
FEVER: 0
SHORTNESS OF BREATH: 0
SPUTUM PRODUCTION: 1
ABDOMINAL PAIN: 0
COUGH: 1
CHILLS: 0
NAUSEA: 0
WHEEZING: 0
DIARRHEA: 0

## 2018-02-01 NOTE — PROGRESS NOTES
[facility] Hospitalist Progress Note    Date of Service: 2018    Chief Complaint  57 y.o. Male who is a  in [place]  with type II diabetes mellitus, and hypertension, admitted 2018 with fevers, chills, nausea, vomiting and cough. Initially seen at [facility], where he was noted to have leukocytosis, and hyponatremia, with CT scan of the chest showing multiple cavitary lung lesions in the upper lobe. Started empirically on broad-spectrum antibiotics and antifungals. ID and pulmonary was consulted. Fungal serology sent. Sputum Gram stain showed gram-positive cocci which came back as MSSA. HIV was negative. HbA1c 12, started on long-term insulin.    Interval Problem Update  2018 - no overnight events. Remains hemodynamically stable and afebrile. Stable on 4L O2 NC. No leukocytosis. Potassium 3.0. Magnesium 1.6. Sodium is normalized. Creatinine normal. S/p FOB and BAL yesterday, which showed bilateral purulent secretions in the right upper lobe and left upper lobe. BAL bacterial fungal cultures in progress. . ID changed antibiotics to unasyn.     > Seen and examined. Feeling better. Denied any SOB. Still with cough, with yellowish phlegm. No CP, N/V, abd pain, diarrhea.       Consultants/Specialty  Pulmonology  ID    Disposition  Monitor on the floors.        ROS     Pertinent positives/negatives as mentioned above.     A complete review of systems was done. All other systems were negative.      Physical Exam  Laboratory/Imaging   Hemodynamics  Temp (24hrs), Av.5 °C (97.7 °F), Min:36.3 °C (97.3 °F), Max:37.1 °C (98.8 °F)   Temperature: 36.4 °C (97.5 °F)  Pulse  Av.3  Min: 80  Max: 119 Heart Rate (Monitored): (!) 107  Blood Pressure: (!) 99/57, NIBP: 113/61      Respiratory      Respiration: 18, Pulse Oximetry: 94 %     Work Of Breathing / Effort: Moderate  RUL Breath Sounds: Rhonchi, RML Breath Sounds: Diminished, RLL Breath Sounds: Diminished, JEANE Breath Sounds: Rhonchi, LLL Breath  Sounds: Diminished    Fluids    Intake/Output Summary (Last 24 hours) at 02/01/18 0820  Last data filed at 02/01/18 0535   Gross per 24 hour   Intake              100 ml   Output              600 ml   Net             -500 ml       Nutrition  Orders Placed This Encounter   Procedures   • DIET ORDER     Standing Status:   Standing     Number of Occurrences:   1     Order Specific Question:   Diet:     Answer:   Diabetic [3]     Physical Exam   Constitutional: He is oriented to person, place, and time. He appears well-developed and well-nourished. No distress.   HENT:   Head: Normocephalic and atraumatic.   Mouth/Throat: Oropharynx is clear and moist. No oropharyngeal exudate.   Eyes: EOM are normal. Pupils are equal, round, and reactive to light. Right eye exhibits no discharge. Left eye exhibits no discharge. No scleral icterus.   Neck: Normal range of motion. Neck supple. No thyromegaly present.   Cardiovascular: Normal rate and regular rhythm.  Exam reveals no gallop and no friction rub.    No murmur heard.  Pulmonary/Chest: Effort normal. He has no wheezes. He has no rales. He exhibits no tenderness.   Diminished air entry B/L bases, otherwise clear to auscultation   Abdominal: Soft. Bowel sounds are normal. There is no tenderness. There is no rebound and no guarding.   Musculoskeletal: Normal range of motion. He exhibits no edema or tenderness.   Lymphadenopathy:     He has no cervical adenopathy.   Neurological: He is alert and oriented to person, place, and time. No cranial nerve deficit. Coordination normal.   Skin: Skin is warm and dry. No rash noted. He is not diaphoretic. No erythema.   Psychiatric: He has a normal mood and affect. His behavior is normal. Thought content normal.   Vitals reviewed.    Recent Labs      01/31/18   0423  02/01/18   0224   WBC  10.7  10.8   RBC  3.81*  3.53*   HEMOGLOBIN  11.2*  10.3*   HEMATOCRIT  32.9*  31.2*   MCV  86.4  88.4   MCH  29.4  29.2   MCHC  34.0  33.0*   RDW  41.6   44.7   PLATELETCT  247  257   MPV  9.7  10.0     Recent Labs      01/31/18   0423  01/31/18   1400  02/01/18   0224   SODIUM  133*   --   136   POTASSIUM  2.9*  3.3*  3.0*   CHLORIDE  101   --   105   CO2  21   --   21   GLUCOSE  212*   --   221*   BUN  11   --   12   CREATININE  0.49*   --   0.52   CALCIUM  8.2*   --   7.4*     Recent Labs      01/30/18   0135   APTT  39.1*   INR  1.19*                  Assessment/Plan     * Necrotizing pneumonia due to MSSA (CMS-HCC)- (present on admission)   Assessment & Plan    - Sputum culture growing staph aureus/MSSA, which could certainly cause cavitary/necrotizing pneumonia. Awaiting further cultures to rule out fungal pneumonia. S/p FOB/BAL.   - Continue Unasyn for MSSA. Maintaining on voriconazole for now, until fungal pneumonia ruled out. Follow BAL cultures. ID following for antibiotic guidance.  - Will follow BAL cytology.   - Continue respiratory support per RT protocol, and oxygen supplements. Wean as able from oxygen as tolerated.        Acute respiratory failure with hypoxia due to necrotizing pneumonia (CMS-HCC)- (present on admission)   Assessment & Plan    - Continue antibiotics, and empiric antifungal for his necrotizing pneumonia. Continue respiratory support with bronchodilators as needed, and continue oxygen supplement to keep saturations above 88%.        Cavitary lesion of lung- (present on admission)   Assessment & Plan    - Likely necrotizing pneumonia from staph aureus. Ruling out fungal etiology. S/p FOB/BAL. Follow cytology and cultures.  - Continue antibiotics and antifungals.        Hypocalcemia- (present on admission)   Assessment & Plan    - Corrected calcium is 10.6 (normal).         Hypokalemia- (present on admission)   Assessment & Plan    - Will start on 40 mEq oral K Dur twice a day. Will give magnesium sulfate 1 g IV for low normal magnesium. Repeat potassium and magnesium levels in the morning.        Hyponatremia- (present on admission)    Assessment & Plan    - Likely due to SIADH from lung process. Sodium is normalized.  - Continue to monitor sodium levels.        Uncontrolled type II diabetes mellitus (CMS-HCC)- (present on admission)   Assessment & Plan    - HbA1c 12.5. Improved, but still has room to get better.  - Increase Lantus to 12 units at bedtime, with goal fasting blood glucose of less than 150. Hold metformin and Victoza for now, resume on discharge.        Hypoalbuminemia- (present on admission)   Assessment & Plan    - Continue to monitor.        Normocytic anemia- (present on admission)   Assessment & Plan    - Likely anemia of chronic disease, with markedly elevated ferritin.   - Continue to monitor, transfuse for hemoglobin less than 7.            Reviewed items::  Labs reviewed, Medications reviewed and Radiology images reviewed  Mei catheter::  No Mei  DVT prophylaxis pharmacological::  Enoxaparin (Lovenox)  Ulcer Prophylaxis::  Not indicated  Antibiotics:  Treating active infection/contamination beyond 24 hours perioperative coverage

## 2018-02-01 NOTE — PROGRESS NOTES
Pulmonary Critical Care Progress Note        Chief Complaint: Inpatient follow up for productive cough, fever    History of Present Illness: 57 y.o. male with history of DM type 2 presented to the ER as a transfer from [place]. He had symptoms of productive cough for 02 weeks duration accompanied by fever and sweating off and on. Expectoration is greenish brown. No hemoptysis.   Patient is a  and reports being exposed to sick contacts on a regular basis. He also has environmental exposure to dust, smoke and chemical toxins. He reports history of pneumonia twice previously. CT chest shows bilateral upper lobe consolidation with necrotic cavitary lesion and bilateral lower lobe patchy densities. Currently being treated empirically with antibiotics and antifungals. He hypokalemia and hyponatremia and hyperglycemia.    ROS: Constitutional: Fever off and on, with sweating, generalized weakness: Respiratory: Dyspnea, productive cough, no pleuritic chest pain. No wheeze, Cardiac: Denies palpitations, angina, orthopnea or paroxysmal nocturnal dyspnea , GI: No nausea, vomiting abdominal pain.  All other systems negative. Neuro: Denies headache, dizziness or seizures. Gu: No hematuria or dysuria, Musculoskeletal: No joint inflammation or arthralgia.    Interval Events:  1/30/18: Hyperglycemia has improved. Frequency of cough decreased with copious expectoration. Hydration status has improved. Normal appetite. No dysuria    1/31/17: No new complaints, however hypokalemia has worsened but hyponatremia has improved. Patient is NPO for bronchoscopy today. Will replace potassium and then proceed with bronchoscopy. Blood glucose levels improved. Afebrile. Has productive cough but no chest pain.    2/1/18: Bronchoscopy with BAL, brushings, TBBx  RUL,JEANE was performed yesterday. Mucopurulent secretions from bot upper lobes was removed.  Patient was kept in telemetry overnight. He is comfortable today morning. Denies chest  pain. Has mild cough. No hemoptysis. No wheeze. Afebrile.  ID splt has changed vancomycin to Unasyn. Had hypokalemia for which potassium supplementation was done prior to bronchoscopy. Hyperglycemia uncontrolled.    PFSH:  No change.    Respiratory:     Pulse Oximetry: 95 % on O2 supplementation        Exam: Not in respiratory distress  Accessory muscles of respiration are not prominent  Bilateral breath sounds audible with no wheeze or crepitations. No pleural rub.  Imaging: CT chest personally reviewed.  Bilateral upper lobe cavitary lesions thick walled with surrounding consolidation.  RML,RLL and LLL patchy infiltrates. No effusion    HemoDynamics:  Pulse: 82, Heart Rate (Monitored): (!) 107  Blood Pressure: 103/67, NIBP: 113/61       Exam: First and second heart sounds audible, tachycardia present. No murmur.        Neuro:  GCS      Exam: Alert and orientated to time place and person. No focal neurological deficit    Fluids:  Intake/Output       01/30/18 0700 - 01/31/18 0659 01/31/18 0700 - 02/01/18 0659 02/01/18 0700 - 02/02/18 0659      5060-6305 1438-9313 Total 7222-0079 5192-4397 Total 1374-8703 2543-0816 Total       Intake    P.O.  --  0 0  100  -- 100  --  -- --    P.O. -- 0 0 100 -- 100 -- -- --    Total Intake -- 0 0 100 -- 100 -- -- --       Output    Urine  --  300 300  --  600 600  --  -- --    Void (ml) -- 300 300 -- 600 600 -- -- --    Total Output -- 300 300 -- 600 600 -- -- --       Net I/O     -- -300 -300 100 -600 -500 -- -- --        Weight: 88.9 kg (195 lb 15.8 oz)  Recent Labs      01/31/18   0423  01/31/18   1130  01/31/18   1400  02/01/18   0224   SODIUM  133*   --    --   136   POTASSIUM  2.9*   --   3.3*  3.0*   CHLORIDE  101   --    --   105   CO2  21   --    --   21   BUN  11   --    --   12   CREATININE  0.49*   --    --   0.52   MAGNESIUM   --   1.6   --   1.6   PHOSPHORUS   --    --    --   2.6   CALCIUM  8.2*   --    --   7.4*       GI/Nutrition:  Exam: Oral mucosa is moist.    Abdomen is soft, non tender. Bowel sounds audible.    Liver Function  Recent Labs      18   GLUCOSE  212*  221*       Heme:  Recent Labs      18   0135  18   1130  18   RBC   --   3.81*   --   3.53*   HEMOGLOBIN   --   11.2*   --   10.3*   HEMATOCRIT   --   32.9*   --   31.2*   PLATELETCT   --   247   --   257   PROTHROMBTM  14.8*   --    --    --    APTT  39.1*   --    --    --    INR  1.19*   --    --    --    IRON   --    --   21*   --    FERRITIN   --    --   1585.5*   --    TOTIRONBC   --    --   130*   --        Infectious Disease:  Temp  Av.6 °C (97.9 °F)  Min: 36.3 °C (97.3 °F)  Max: 37.1 °C (98.8 °F)  Micro: Blood cultures no growth yet  Sputum Gram stain: Many WBCs and Gram positive cocci  Sputum culture: Staphylococcus   BAL, Brushing and TBBx results are pending for cultures  Recent Labs      18   WBC  10.7  10.8   NEUTSPOLYS  86.10*  83.20*   LYMPHOCYTES  7.00*  8.90*   MONOCYTES  4.30  3.50   EOSINOPHILS  1.70  0.90   BASOPHILS  0.90  3.50*     Current Facility-Administered Medications   Medication Dose Frequency Provider Last Rate Last Dose   • insulin glargine (LANTUS) injection 12 Units  12 Units Q EVENING Tyson Bianchi M.D.       • potassium chloride SA (Kdur) tablet 40 mEq  40 mEq BID Tyson Bianchi M.D.   40 mEq at 18 0854   • Magnesium Sulfate in D5W IVPB premix 1 g  1 g Once Tyson Bianchi M.D.       • NS infusion   Continuous Muhammad K Gondal, M.D. 10 mL/hr at 18 1402     • ampicillin/sulbactam (UNASYN) 3 g in  mL IVPB  3 g Q6HRS Re Bermudez M.D. 200 mL/hr at 18 0854 3 g at 18 0854   • mag hydrox-al hydrox-simeth (MAALOX PLUS ES or MYLANTA DS) suspension 10 mL  10 mL 4X/DAY PRN Lito Giraldo M.D.   10 mL at 18 1251   • ipratropium-albuterol (DUONEB) nebulizer solution 3 mL  3 mL Q4H PRN (RT) Lito Giraldo M.D.   3 mL at 18  0349   • senna-docusate (PERICOLACE or SENOKOT S) 8.6-50 MG per tablet 2 Tab  2 Tab BID Brandon Mota M.D.   2 Tab at 01/31/18 0811    And   • polyethylene glycol/lytes (MIRALAX) PACKET 1 Packet  1 Packet QDAY PRN Brandon Mota M.D.        And   • magnesium hydroxide (MILK OF MAGNESIA) suspension 30 mL  30 mL QDAY PRN Brandon Mota M.D.        And   • bisacodyl (DULCOLAX) suppository 10 mg  10 mg QDAY PRN Brandon Mota M.D.       • Respiratory Care per Protocol   Continuous RT Brandon Mota M.D.       • acetaminophen (TYLENOL) tablet 650 mg  650 mg Q6HRS PRN Brandon Mota M.D.       • Pharmacy Consult Request ...Pain Management Review   PRN Brandon Mota M.D.        And   • oxycodone immediate-release (ROXICODONE) tablet 2.5 mg  2.5 mg Q3HRS PRLULU Mota M.D.   2.5 mg at 01/29/18 1810    And   • oxycodone immediate-release (ROXICODONE) tablet 5 mg  5 mg Q3HRS PRLULU Mota M.D.   5 mg at 02/01/18 0854    And   • morphine (pf) 4 mg/ml injection 2 mg  2 mg Q3HRS PRLULU Mota M.D.       • insulin regular (HUMULIN R) injection 2-9 Units  2-9 Units 4X/DAY ACHDIMITRI Mota M.D.   3 Units at 02/01/18 0633    And   • glucose 4 g chewable tablet 16 g  16 g Q15 MIN PRLULU Mota M.D.        And   • dextrose 50% (D50W) injection 25 mL  25 mL Q15 MIN PRLULU Mota M.D.       • ondansetron (ZOFRAN) syringe/vial injection 4 mg  4 mg Q4HRS PRLULU Mota M.D.       • ondansetron (ZOFRAN ODT) dispertab 4 mg  4 mg Q4HRS PRLULU Mota M.D.       • promethazine (PHENERGAN) tablet 12.5-25 mg  12.5-25 mg Q4HRS PRLULU Mota M.D.       • promethazine (PHENERGAN) suppository 12.5-25 mg  12.5-25 mg Q4HRS PRN Brandon Mota M.D.       • prochlorperazine (COMPAZINE) injection 5-10 mg  5-10 mg Q4HRS PRN Brandon Mota M.D.       • zolpidem (AMBIEN) tablet 5 mg  5 mg HS PRN - MR X 1 Brandon Mota,  M.D.       • ibuprofen (MOTRIN) tablet 400 mg  400 mg Q6HRS PRN Brandon Mota M.D.       • guaiFENesin (ROBITUSSIN) 100 MG/5ML solution 200 mg  10 mL Q4HRS PRN Brandon Mota M.D.   200 mg at 01/30/18 0314   • voriconazole (VFEND) tablet 200 mg  200 mg Q12HRS Donald Ga M.D.   200 mg at 01/31/18 2201     Last reviewed on 1/31/2018  3:30 PM by Evonne Trent R.N.    Quality  Measures:  Labs reviewed, Medications reviewed and Radiology images reviewed  Mei catheter: No Mei      DVT Prophylaxis: Enoxaparin (Lovenox)            Problems:  Necrotizing pneumonia bilateral upper lobes. S/p bronchoscopy  Hyponatremia improved, likely sec to hyperglycemia and SIADH not excluded yet  Hypokalemia  Diabetes Mellitus type 2 uncontrolled      Plan:  Continue oxygen supplementation and titrate to maintain SpO2 above 94%.  Management plan discussed with patient and he agrees with plan.  Incentive spirometry  Will follow culture  Restart Lovenox  Continue bronchodilators, antifungal and antibiotics.    Management paln was discussed with patient .

## 2018-02-01 NOTE — PROGRESS NOTES
Patient came to the floor at 18:30 from PACU. Skin check completed with Dina TIWARI. Redness behind both ears bilat. Sacral redness, blanching.

## 2018-02-01 NOTE — ASSESSMENT & PLAN NOTE
- Resolved. Doing well off oxygen supplements.  - Continue antibiotics/antifungal for necrotizing pneumonia. Continue respiratory support with PRN bronchodilators, and PRN oxygen supplement.

## 2018-02-01 NOTE — PROGRESS NOTES
Infectious Disease Progress Note    Author: Dee Hampton M.D. Date & Time of service: 2018  10:27 AM    Chief Complaint:  Cavitary lung lesions      Interval History:  57-year-old diabetic  admitted due to worsening cough, found to have CT with bilateral upper lobe   cavitary lesions   Temp 100.8, sputum +SA Feels much better today   AF WBC 10.9 feels better-awaiting bronch   AF, WBC 10.8, denies any SOB, had bronch yesterday, no abd pain or diarrhea  Labs Reviewed, Medications Reviewed and Radiology Reviewed.    Review of Systems:  Review of Systems   Constitutional: Negative for chills and fever.   Respiratory: Positive for cough and sputum production. Negative for hemoptysis, shortness of breath and wheezing.    Cardiovascular: Negative for chest pain.   Gastrointestinal: Negative for abdominal pain, diarrhea, nausea and vomiting.   All other systems reviewed and are negative.      Hemodynamics:  Temp (24hrs), Av.6 °C (97.9 °F), Min:36.3 °C (97.3 °F), Max:37.1 °C (98.8 °F)  Temperature: 37.1 °C (98.7 °F)  Pulse  Av.6  Min: 80  Max: 119Heart Rate (Monitored): (!) 107  Blood Pressure: 103/67, NIBP: 113/61       Physical Exam:  Physical Exam   Constitutional: He is oriented to person, place, and time. He appears well-developed. No distress.   HENT:   Head: Normocephalic and atraumatic.   Eyes: EOM are normal. Pupils are equal, round, and reactive to light.   Neck: Neck supple.   Cardiovascular: Normal rate.    No murmur heard.  Pulmonary/Chest: Effort normal. No respiratory distress. He has no wheezes. He has rales.   Abdominal: Soft. He exhibits no distension. There is no tenderness. There is no rebound.   Musculoskeletal: He exhibits no edema.   Neurological: He is alert and oriented to person, place, and time.   Skin: No rash noted.   Nursing note and vitals reviewed.      Meds:    Current Facility-Administered Medications:   •  insulin glargine  •  potassium chloride  SA  •  magnesium sulfate  •  enoxaparin (LOVENOX) injection  •  NS  •  ampicillin-sulbactam (UNASYN) IV  •  mag hydrox-al hydrox-simeth  •  ipratropium-albuterol  •  senna-docusate **AND** polyethylene glycol/lytes **AND** magnesium hydroxide **AND** bisacodyl  •  Respiratory Care per Protocol  •  acetaminophen  •  Notify provider if pain remains uncontrolled **AND** Use the numeric rating scale (NRS-11) on regular floors and Critical-Care Pain Observation Tool (CPOT) on ICUs/Trauma to assess pain **AND** Pulse Ox (Oximetry) **AND** Pharmacy Consult Request **AND** If patient difficult to arouse and/or has respiratory depression, stop any opiates that are currently infusing and call a Rapid Response. **AND** oxyCODONE immediate-release **AND** oxyCODONE immediate-release **AND** morphine injection  •  insulin regular **AND** Accu-Chek ACHS **AND** NOTIFY MD and PharmD **AND** glucose 4 g **AND** dextrose 50%  •  ondansetron  •  ondansetron  •  promethazine  •  promethazine  •  prochlorperazine  •  zolpidem  •  ibuprofen  •  guaiFENesin  •  voriconazole    Labs:  Recent Labs      01/31/18 0423 02/01/18 0224   WBC  10.7  10.8   RBC  3.81*  3.53*   HEMOGLOBIN  11.2*  10.3*   HEMATOCRIT  32.9*  31.2*   MCV  86.4  88.4   MCH  29.4  29.2   RDW  41.6  44.7   PLATELETCT  247  257   MPV  9.7  10.0   NEUTSPOLYS  86.10*  83.20*   LYMPHOCYTES  7.00*  8.90*   MONOCYTES  4.30  3.50   EOSINOPHILS  1.70  0.90   BASOPHILS  0.90  3.50*   RBCMORPHOLO  Present  Present     Recent Labs      01/31/18 0423 01/31/18   1400  02/01/18 0224   SODIUM  133*   --   136   POTASSIUM  2.9*  3.3*  3.0*   CHLORIDE  101   --   105   CO2  21   --   21   GLUCOSE  212*   --   221*   BUN  11   --   12     Recent Labs      01/31/18 0423 02/01/18   0224   ALBUMIN   --   2.0*   CREATININE  0.49*  0.52       Imaging:  No results found.    Micro:  Results     Procedure Component Value Units Date/Time    GRAM STAIN [[id number]] Collected:  01/31/18  1630    Order Status:  Completed Specimen:  Respirate Updated:  02/01/18 0903     Significant Indicator .     Source RESP     Site Bronchoalveolar Lavage JEANE     Gram Stain Result --     Many WBCs.  Moderate Gram positive cocci.      Narrative:       Collected By:[id number] GONDAL MUHAMMAD K  RUL  Collected By:[id number] VELIAAL ESPARZA K  JEANE    FUNGAL CULTURE [[id number]] Collected:  01/31/18 1630    Order Status:  Completed Specimen:  Respirate from Bronchoalveolar Lavage Updated:  02/01/18 0902     Significant Indicator NEG     Source RESP     Site Bronchoalveolar Lavage JEANE     Fungal Culture Culture in progress.    Narrative:       Collected By:[id number] VELIAAL ESPARZA K  RUL  Collected By:[id number] GONDAL ESPARZA K  JEANE    GRAM STAIN [[id number]] Collected:  01/31/18 1630    Order Status:  Completed Specimen:  Respirate Updated:  02/01/18 0902     Significant Indicator .     Source RESP     Site Bronchial Washings     Gram Stain Result --     Few WBCs.  Few Gram positive cocci.      Narrative:       Collected By:[id number] VELIAAL ESPARZA K  JEANE    GRAM STAIN [[id number]] Collected:  01/31/18 1630    Order Status:  Completed Specimen:  Respirate Updated:  02/01/18 0902     Significant Indicator .     Source RESP     Site Bronchoalveolar Lavage RUL     Gram Stain Result --     Rare WBCs.  Rare Gram positive cocci.      Narrative:       Collected By:[id number] GONDAL MUHAMMAD K  JEANE    FUNGAL CULTURE [[id number]] Collected:  01/31/18 1630    Order Status:  Completed Specimen:  Respirate from Bronchoalveolar Lavage Updated:  02/01/18 0902     Significant Indicator NEG     Source RESP     Site Bronchoalveolar Lavage RUL     Fungal Culture Culture in progress.    Narrative:       Collected By:[id number] VELIAAL ESPARZA K  JEANE    FUNGAL CULTURE [[id number]] Collected:  01/31/18 1630    Order Status:  Completed Specimen:  Respirate from Bronchoalveolar Lavage Updated:  02/01/18 0902     Significant Indicator NEG     Source RESP     Site  Bronchial Washings     Fungal Culture Culture in progress.    Narrative:       Collected By:[id number] GONDAL MUHAMMAD K  EJANE    GRAM STAIN [[id number]] Collected:  01/31/18 1630    Order Status:  Completed Specimen:  Respirate Updated:  02/01/18 0902     Significant Indicator .     Source RESP     Site Bronchial Brushings JEANE     Gram Stain Result No organisms seen.    Narrative:       Collected By:[id number] GONDAL MUHAMMAD K  RUL    FUNGAL CULTURE [[id number]] Collected:  01/31/18 1630    Order Status:  Completed Specimen:  Respirate from Bronchoalveolar Lavage Updated:  02/01/18 0901     Significant Indicator NEG     Source RESP     Site Bronchial Brushings JEANE     Fungal Culture Culture in progress.    Narrative:       Collected By:[id number] GONDAL MUHAMMAD K  RUL    AFB CULTURE [[id number]] Collected:  01/31/18 1630    Order Status:  Completed Specimen:  Respirate from Bronchoalveolar Lavage Updated:  01/31/18 1929    Narrative:       Collected By:[id number] GONDAL MUHAMMAD K  RUL    RESP CULTURE [[id number]] Collected:  01/31/18 1630    Order Status:  Completed Specimen:  Respirate from Bronchoalveolar Lavage Updated:  01/31/18 1929    Narrative:       Collected By:[id number] GONDAL MUHAMMAD K  RUL    CULTURE RESPIRATORY W/ GRM STN [[id number]] Collected:  01/31/18 1630    Order Status:  Completed Specimen:  Respirate from Bronchoalveolar Lavage Updated:  01/31/18 1927    Narrative:       Collected By:[id number] GONDAL MUHAMMAD K  JEANE    AFB CULTURE [[id number]] Collected:  01/31/18 1630    Order Status:  Completed Specimen:  Respirate from Bronchoalveolar Lavage Updated:  01/31/18 1927    Narrative:       Collected By:[id number] GONDAL MUHAMMAD K  JEANE    QUANT BRONCHIAL WASHING [[id number]] Collected:  01/31/18 1630    Order Status:  Completed Specimen:  Other Updated:  01/31/18 1926    Narrative:       Collected By:[id number] GONDAL MUHAMMAD K  JEANE    AFB [[id number]] Collected:  01/31/18 1630    Order Status:  Completed Specimen:  Respirate  from Bronchoalveolar Lavage Updated:  01/31/18 1926    Narrative:       Collected By:[id number] GONDAL MUHAMMAD K  JEANE    QUANT BRONCHIAL WASHING [[id number]] Collected:  01/31/18 1630    Order Status:  Completed Specimen:  Other Updated:  01/31/18 1925    Narrative:       Collected By:[id number] GONDAL MUHAMMAD K  RUL  Collected By:[id number] GONDAL MUHAMMAD K  JEANE    AFB [[id number]] Collected:  01/31/18 1630    Order Status:  Completed Specimen:  Respirate from Bronchoalveolar Lavage Updated:  01/31/18 1924    Narrative:       Collected By:[id number] GONDAL MUHAMMAD K  RUL  Collected By:[id number] GONDAL MUHAMMAD K  JEANE    CULTURE RESPIRATORY W/ GRM STN [[id number]] Collected:  01/31/18 1630    Order Status:  Canceled Specimen:  Other from Bronchoalveolar Lavage     CULTURE RESPIRATORY W/ GRM STN [[id number]] Collected:  01/31/18 1630    Order Status:  Canceled Specimen:  Other from Bronchoalveolar Lavage     Culture Respiratory W/ GRM STN [[id number]]  (Abnormal)  (Susceptibility) Collected:  01/29/18 0230    Order Status:  Completed Specimen:  Respirate from Sputum Updated:  01/31/18 0746     Gram Stain Result --     Many WBCs.  Few epithelial cells.  Many Gram positive cocci.  Specimen Quality Score: 2+       Significant Indicator POS (POS)     Source RESP     Site SPUTUM     Respiratory Culture -- (A)     Respiratory Culture -- (A)     Staphylococcus aureus  Heavy growth      Narrative:       Collected By:ANTONIA Thompson before first antibiotic dose - add Gram stain if  indicated    Culture & Susceptibility     STAPHYLOCOCCUS AUREUS     Antibiotic Sensitivity Microscan Unit Status    Ampicillin/sulbactam Sensitive <=8/4 mcg/mL Final    Clindamycin Sensitive <=0.5 mcg/mL Final    Daptomycin Sensitive 1 mcg/mL Final    Erythromycin Sensitive <=0.5 mcg/mL Final    Moxifloxacin Sensitive <=0.5 mcg/mL Final    Oxacillin Sensitive <=0.25 mcg/mL Final    Tetracycline Sensitive <=4 mcg/mL Final    Trimeth/Sulfa Sensitive  "<=0.5/9.5 mcg/mL Final    Vancomycin Sensitive 2 mcg/mL Final                       MRSA BY PCR (AMP) [[id number]]     Order Status:  No result Specimen:  Respirate from Nares     GRAM STAIN [[id number]] Collected:  01/29/18 0230    Order Status:  Completed Specimen:  Respirate Updated:  01/29/18 1310     Significant Indicator .     Source RESP     Site SPUTUM     Gram Stain Result --     Many WBCs.  Few epithelial cells.  Many Gram positive cocci.  Specimen Quality Score: 2+      Narrative:       Collected By:ANTONIA MILLER  Preferably before first antibiotic dose - add Gram stain if  indicated    BLOOD CULTURE [[id number]] Collected:  01/28/18 1908    Order Status:  Completed Specimen:  Blood from Peripheral Updated:  01/29/18 0840     Significant Indicator NEG     Source BLD     Site PERIPHERAL     Blood Culture --     No Growth    Note: Blood cultures are incubated for 5 days and  are monitored continuously.Positive blood cultures  are called to the RN and reported as soon as  they are identified.      Narrative:       Per Hospital Policy: Only change Specimen Src: to \"Line\" if  specified by physician order.    BLOOD CULTURE [[id number]] Collected:  01/28/18 1908    Order Status:  Completed Specimen:  Blood from Peripheral Updated:  01/29/18 0840     Significant Indicator NEG     Source BLD     Site PERIPHERAL     Blood Culture --     No Growth    Note: Blood cultures are incubated for 5 days and  are monitored continuously.Positive blood cultures  are called to the RN and reported as soon as  they are identified.      Narrative:       Per Hospital Policy: Only change Specimen Src: to \"Line\" if  specified by physician order.          Assessment:  Active Hospital Problems    Diagnosis   • *Pneumonia [J18.9]   • Cavitary lesion of lung [J98.4]   • Hyponatremia [E87.1]   • Hypokalemia [E87.6]   • Hypocalcemia [E83.51]   • Hypoalbuminemia [E88.09]   • Normocytic anemia [D64.9]   • Type II diabetes mellitus " (CMS-HCC) [E11.9]       Plan:  Cavitary lung lesions, additional work  Low grade fever resolved  No leukocytosis  Blood cxs neg  HIV neg  Sputum +MSSA  S/p Bronch with BAL on 1/31  BAL gram stain +GPC, Cx - pending  May need JOI  Continue Unasyn and vori  Cocci serology pending-continue vori    Diabetes, poorly controlled  Keep BS under 150 to help control current infection  Hegb A1C 12.5    Discussed with internal medicine

## 2018-02-01 NOTE — PROGRESS NOTES
Bedside report received, pt care assumed, tele box on. VSS, pt assessment complete. Pt aaox4, no signs of distress noted at this time. POC discussed with pt and verbalizes no questions. Pt reports of 5/10 pain in his lower back but declines interventions at this time. Pt denies any additional needs at this time. Bed in lowest position, pt educated on fall risk and verbalized understanding, call light within reach, hourly rounding in place.

## 2018-02-01 NOTE — PROGRESS NOTES
Patient has been tolerating room air while sitting up in bed. His oxygen does periodically drop down to 88% but remains around 90% most of the time. Ambulating O2 sats taken and he desaturated to 87% then requested to get back into bed. He tolerated walking well though and had no weakness, denied SOB. He has continued to cough up a significant amount thick yellow brown sputum throughout the day.

## 2018-02-01 NOTE — PROCEDURES
DATE OF SERVICE:  01/31/2018    PREOPERATIVE DIAGNOSIS:  Bilateral upper lobe pneumonia with necrotizing   pneumonitis and cavitary lesions.    POSTOPERATIVE DIAGNOSIS:  Bilateral upper lobe pneumonia with necrotizing   pneumonitis and cavitary lesions.    PROCEDURE PERFORMED:  Fiberoptic bronchoscopy with inspection bronchoalveolar   lavage and transbronchial biopsy.    SURGEON:  Muhammad K. Gondal, MD    ANESTHESIA:  Monitored anesthesia care by anesthesia team.    INDICATIONS:  Bilateral cavitary pneumonia to determine etiology.    FINDINGS:  Bilateral purulent secretions present in the right upper lobe and   the left upper lobe.    SPECIMENS:  A 20 mL of bronchial washing, 10 mL each of bronchoalveolar lavage   from the right upper lobe and left upper lobe.  Brushing specimens from the   left upper lobe.  Transbronchial biopsy specimens from the left upper lobe and   right upper lobe for histopathology.    PROCEDURE:  Informed consent was obtained from the patient after explaining   the benefits and risks of the procedure including, but not limited to   bleeding, infection, airway trauma, pneumothorax, hemothorax, arrhythmias, or   death.  Patient expressed understanding and in agreement.    DESCRIPTION OF PROCEDURE:  The procedure was performed in the OR.  After   following the informed consent, a timeout was performed.  The patient was   properly identified and optimally positioned in bed.  Anesthesia team provided   monitored anesthesia care and LMA was passed.  The fiberoptic Pentax   bronchoscope was advanced through the LMA.  Vocal cords were visualized and   they were sprayed with 2 mL of 1% lidocaine.  Then, the scope was advanced   through the vocal cords into the trachea and up to the sharon.  The sharon was   sharp.  Mucopurulent secretions were present in the trachea and right and   left mainstem, which were suctioned with saline spray and suctioning.  There   was complete occlusion of the right  upper lobe airway.  The purulent   secretions were removed with saline spray and suctioning with bronchial   washing and then the 3 openings of the right upper lobe were visualized.  The   scope was advanced through the right lower lobe, right middle lobe, lower   lobe, and superior segment of the right lower lobe were patent.  The scope was   then retracted and advanced towards the left mainstem, left upper lobe and   lower lobe were visualized.  Purulent secretions were present in the anterior   and apicoposterior segments of the left upper lobe, which were removed with   saline irrigation and suctioning.  The bronchial washing specimen was   collected.  Then, the scope was wedged in the left upper lobe and   bronchoalveolar lavage was performed with 20 mL normal saline aliquots x3 and   10 mL of bronchoalveolar lavage fluid was obtained.  Then, brushing was   performed from the left upper lobe apical segment under fluoroscopy and slides   were made, following that 2 left upper lobe transbronchial biopsies were   obtained under fluoroscopic guidance.  After ensuring that there was no   bleeding, the scope was removed and then it was wedged in the right upper lobe   apical segment and bronchoalveolar lavage was performed in the right upper   lobe apical and anterior segment with 20 mL saline aliquots for total of 60   mL.  The bronchoalveolar specimen was collected.  Then, transbronchial biopsy   specimens were obtained from the right upper lobe under fluoroscopic guidance   and after ensuring that there was no bleeding, airway toilet was performed and   then the scope was retracted.  Medications used as per anesthesia team.    FINDINGS:  Vocal cords normal, subglottis normal, tracheal mucosa was normal.    There were mucopurulent secretions in the trachea and on sharon.  Sharon was   sharp.  There was mucosal edema of the right upper and left upper lobe airways   with purulent secretions draining from the apical  anterior and posterior   segments of the right upper lobe and apicoposterior segments of the left upper   lobe.  The lower lobe subsegments were patent and clear.    COMPLICATIONS:  None.    The patient tolerated the procedure well.  There were no complications.       ____________________________________     Muhammad K. Gondal, MD [facility] / [facility]    DD:  01/31/2018 17:27:17  DT:  01/31/2018 18:21:31    D#:  [id number]  Job#:  [id number]

## 2018-02-01 NOTE — CARE PLAN
Problem: Communication  Goal: The ability to communicate needs accurately and effectively will improve  Outcome: PROGRESSING AS EXPECTED  Patient understand communication plan and communicates effectively.     Problem: Infection  Goal: Will remain free from infection    Intervention: Assess signs and symptoms of infection  Patient understands signs and symptoms of infection

## 2018-02-01 NOTE — PROGRESS NOTES
Decreased O2 this morning to 2L [place]. Patient has tolerated that well. We are now trying room air. Wife at bedside. Patient and wife verbalized understanding of oxygen needs and to put O2 back on or to call if oxygen remains under 88%. Will continue to monitor, patient is on continuous pulse ox for monitoring.

## 2018-02-01 NOTE — OR NURSING
1626 received from the OR, report from Dr García, S/p bronchoscopy, asleep with LMA,   1636  LMA d/c'd, pt coughing continuously, albuterol RX given w/o relief, nasal trumpet inserted with better saturation,   1641 nasal trumpet d/c'd, more awake, cont to cough & sats in 80's,  1715 coughed up blood tinged mucous & breathing & coughing improved,  1730 weaning O2 & pt not coughing as much, c/o chronic back pain, meds given  1800 report called to Ana NIELSEN  Iv fell out, restarted,  1830 transported to T7-

## 2018-02-01 NOTE — CARE PLAN
Problem: Communication  Goal: The ability to communicate needs accurately and effectively will improve  Outcome: PROGRESSING AS EXPECTED    Intervention: [place] patient and significant other/support system to call light to alert staff of needs   02/01/18 0254   OTHER   Oriented to: All of the Following : Location of Bathroom, Visiting Policy, Unit Routine, Call Light and Bedside Controls, Bedside Rail Policy, Smoking Policy, Rights and Responsibilities, Bedside Report, and Patient Education Notebook         Problem: Pain Management  Goal: Pain level will decrease to patient's comfort goal  Outcome: PROGRESSING AS EXPECTED  Pt receiving prn pain medication as ordered per MD. Pt also using distraction to decrease pain.

## 2018-02-01 NOTE — FLOWSHEET NOTE
01/31/18 1629   General Vent Information   Pulse Oximetry 88 %   Heart Rate (Monitored) (!) 116   Bronchoscopy Procedure   Bronchoscopy Procedure Yes   Pre-Op Teaching Yes   Consent Signed Yes   Time Out Performed Yes   Medication Allergy Reviewed Yes   Procedure Performed in Bronch Suite? No   #Diagnostic Procedure  Yes   Start Time 1535   End Time 1625   Performing Physician Dr. Gondal   Indication(s) for Procedure Identified by Physician Other  (bilateral necrotizing PNA)   Specimen(s) Specified by Physician Biopsy for Histology;BAL Quantitative Respiratory Culture Intracellular Pathogens;Quantitative Respiratory Culture of Mariano Brush;Slides for Cytology;Broncho-Alveolar Lavage for Pulmonary Pathogen Panel   Brushing(s) AFB;Cytology;Fungi;Respiratory Culture   Washing(s) Cytology;AFB;Fungi;Respiratory Culture   Specimen(s) Identified / Labeled for Location RUL;JEANE   Picture(s) Obtained Labeled with Patients Identification, Date and Time, Place in Chart   Scope Serial Number [id number]   Post Procedure Response Pt lucie procedure well

## 2018-02-02 LAB
ALBUMIN SERPL BCP-MCNC: 1.9 G/DL (ref 3.2–4.9)
ANION GAP SERPL CALC-SCNC: 7 MMOL/L (ref 0–11.9)
BACTERIA BLD CULT: NORMAL
BACTERIA BLD CULT: NORMAL
BACTERIA SPEC RESP CULT: NORMAL
BACTERIA SPEC RESP CULT: NORMAL
BUN SERPL-MCNC: 7 MG/DL (ref 8–22)
CALCIUM SERPL-MCNC: 7.4 MG/DL (ref 8.5–10.5)
CHLORIDE SERPL-SCNC: 103 MMOL/L (ref 96–112)
CO2 SERPL-SCNC: 25 MMOL/L (ref 20–33)
CREAT SERPL-MCNC: 0.53 MG/DL (ref 0.5–1.4)
GLUCOSE BLD-MCNC: 170 MG/DL (ref 65–99)
GLUCOSE BLD-MCNC: 195 MG/DL (ref 65–99)
GLUCOSE BLD-MCNC: 216 MG/DL (ref 65–99)
GLUCOSE BLD-MCNC: 262 MG/DL (ref 65–99)
GLUCOSE SERPL-MCNC: 154 MG/DL (ref 65–99)
GRAM STN SPEC: NORMAL
GRAM STN SPEC: NORMAL
MAGNESIUM SERPL-MCNC: 1.7 MG/DL (ref 1.5–2.5)
PHOSPHATE SERPL-MCNC: 2.6 MG/DL (ref 2.5–4.5)
POTASSIUM SERPL-SCNC: 3.2 MMOL/L (ref 3.6–5.5)
SIGNIFICANT IND 70042: NORMAL
SITE SITE: NORMAL
SODIUM SERPL-SCNC: 135 MMOL/L (ref 135–145)
SOURCE SOURCE: NORMAL

## 2018-02-02 PROCEDURE — 82962 GLUCOSE BLOOD TEST: CPT | Mod: 91

## 2018-02-02 PROCEDURE — 700102 HCHG RX REV CODE 250 W/ 637 OVERRIDE(OP): Performed by: HOSPITALIST

## 2018-02-02 PROCEDURE — A9270 NON-COVERED ITEM OR SERVICE: HCPCS | Performed by: INTERNAL MEDICINE

## 2018-02-02 PROCEDURE — 80069 RENAL FUNCTION PANEL: CPT

## 2018-02-02 PROCEDURE — 700102 HCHG RX REV CODE 250 W/ 637 OVERRIDE(OP): Performed by: INTERNAL MEDICINE

## 2018-02-02 PROCEDURE — 36415 COLL VENOUS BLD VENIPUNCTURE: CPT

## 2018-02-02 PROCEDURE — 700111 HCHG RX REV CODE 636 W/ 250 OVERRIDE (IP): Performed by: INTERNAL MEDICINE

## 2018-02-02 PROCEDURE — 94640 AIRWAY INHALATION TREATMENT: CPT

## 2018-02-02 PROCEDURE — A9270 NON-COVERED ITEM OR SERVICE: HCPCS | Performed by: HOSPITALIST

## 2018-02-02 PROCEDURE — 99232 SBSQ HOSP IP/OBS MODERATE 35: CPT | Performed by: INTERNAL MEDICINE

## 2018-02-02 PROCEDURE — 770020 HCHG ROOM/CARE - TELE (206)

## 2018-02-02 PROCEDURE — 83735 ASSAY OF MAGNESIUM: CPT

## 2018-02-02 PROCEDURE — 700105 HCHG RX REV CODE 258: Performed by: INTERNAL MEDICINE

## 2018-02-02 PROCEDURE — 700101 HCHG RX REV CODE 250: Performed by: FAMILY MEDICINE

## 2018-02-02 RX ORDER — INSULIN GLARGINE 100 [IU]/ML
14 INJECTION, SOLUTION SUBCUTANEOUS EVERY EVENING
Status: DISCONTINUED | OUTPATIENT
Start: 2018-02-02 | End: 2018-02-06

## 2018-02-02 RX ADMIN — INSULIN GLARGINE 14 UNITS: 100 INJECTION, SOLUTION SUBCUTANEOUS at 20:23

## 2018-02-02 RX ADMIN — OXYCODONE HYDROCHLORIDE 5 MG: 5 TABLET ORAL at 08:40

## 2018-02-02 RX ADMIN — AMPICILLIN SODIUM AND SULBACTAM SODIUM 3 G: 2; 1 INJECTION, POWDER, FOR SOLUTION INTRAMUSCULAR; INTRAVENOUS at 04:32

## 2018-02-02 RX ADMIN — IPRATROPIUM BROMIDE AND ALBUTEROL SULFATE 3 ML: .5; 3 SOLUTION RESPIRATORY (INHALATION) at 09:04

## 2018-02-02 RX ADMIN — OXYCODONE HYDROCHLORIDE 5 MG: 5 TABLET ORAL at 22:03

## 2018-02-02 RX ADMIN — SODIUM CHLORIDE: 900 INJECTION INTRAVENOUS at 04:32

## 2018-02-02 RX ADMIN — VORICONAZOLE 200 MG: 200 TABLET, FILM COATED ORAL at 20:22

## 2018-02-02 RX ADMIN — ENOXAPARIN SODIUM 40 MG: 100 INJECTION SUBCUTANEOUS at 08:37

## 2018-02-02 RX ADMIN — INSULIN HUMAN 3 UNITS: 100 INJECTION, SOLUTION PARENTERAL at 17:08

## 2018-02-02 RX ADMIN — AMPICILLIN SODIUM AND SULBACTAM SODIUM 3 G: 2; 1 INJECTION, POWDER, FOR SOLUTION INTRAMUSCULAR; INTRAVENOUS at 22:03

## 2018-02-02 RX ADMIN — ZOLPIDEM TARTRATE 5 MG: 5 TABLET, FILM COATED ORAL at 22:01

## 2018-02-02 RX ADMIN — AMPICILLIN SODIUM AND SULBACTAM SODIUM 3 G: 2; 1 INJECTION, POWDER, FOR SOLUTION INTRAMUSCULAR; INTRAVENOUS at 08:53

## 2018-02-02 RX ADMIN — INSULIN HUMAN 5 UNITS: 100 INJECTION, SOLUTION PARENTERAL at 20:26

## 2018-02-02 RX ADMIN — GUAIFENESIN 200 MG: 100 SOLUTION ORAL at 20:30

## 2018-02-02 RX ADMIN — VORICONAZOLE 200 MG: 200 TABLET, FILM COATED ORAL at 08:40

## 2018-02-02 RX ADMIN — INSULIN HUMAN 2 UNITS: 100 INJECTION, SOLUTION PARENTERAL at 12:13

## 2018-02-02 RX ADMIN — POTASSIUM CHLORIDE 40 MEQ: 1500 TABLET, EXTENDED RELEASE ORAL at 09:00

## 2018-02-02 RX ADMIN — OXYCODONE HYDROCHLORIDE 5 MG: 5 TABLET ORAL at 18:12

## 2018-02-02 RX ADMIN — INSULIN HUMAN 2 UNITS: 100 INJECTION, SOLUTION PARENTERAL at 06:24

## 2018-02-02 RX ADMIN — AMPICILLIN SODIUM AND SULBACTAM SODIUM 3 G: 2; 1 INJECTION, POWDER, FOR SOLUTION INTRAMUSCULAR; INTRAVENOUS at 15:46

## 2018-02-02 RX ADMIN — POTASSIUM CHLORIDE 40 MEQ: 1500 TABLET, EXTENDED RELEASE ORAL at 20:22

## 2018-02-02 RX ADMIN — IPRATROPIUM BROMIDE AND ALBUTEROL SULFATE 3 ML: .5; 3 SOLUTION RESPIRATORY (INHALATION) at 17:06

## 2018-02-02 ASSESSMENT — ENCOUNTER SYMPTOMS
HEMOPTYSIS: 0
DIARRHEA: 0
ABDOMINAL PAIN: 0
SPUTUM PRODUCTION: 1
CHILLS: 0
VOMITING: 0
COUGH: 1
FEVER: 0
WHEEZING: 0
SHORTNESS OF BREATH: 0
NAUSEA: 0

## 2018-02-02 ASSESSMENT — PAIN SCALES - GENERAL
PAINLEVEL_OUTOF10: 3
PAINLEVEL_OUTOF10: 4
PAINLEVEL_OUTOF10: 6
PAINLEVEL_OUTOF10: 2
PAINLEVEL_OUTOF10: 2
PAINLEVEL_OUTOF10: 3
PAINLEVEL_OUTOF10: 5

## 2018-02-02 NOTE — PROGRESS NOTES
Pulmonary Critical Care Progress Note        Chief Complaint: Inpatient follow up for productive cough, fever    History of Present Illness: 57 y.o. male with history of DM type 2 presented to the ER as a transfer from [place]. He had symptoms of productive cough for 02 weeks duration accompanied by fever and sweating off and on. Expectoration is greenish brown. No hemoptysis.   Patient is a  and reports being exposed to sick contacts on a regular basis. He also has environmental exposure to dust, smoke and chemical toxins. He reports history of pneumonia twice previously. CT chest shows bilateral upper lobe consolidation with necrotic cavitary lesion and bilateral lower lobe patchy densities. Currently being treated empirically with antibiotics and antifungals. He hypokalemia and hyponatremia and hyperglycemia.    ROS: Constitutional: Fever off and on, with sweating, generalized weakness: Respiratory: Dyspnea, productive cough, no pleuritic chest pain. No wheeze, Cardiac: Denies palpitations, angina, orthopnea or paroxysmal nocturnal dyspnea , GI: No nausea, vomiting abdominal pain.  All other systems negative. Neuro: Denies headache, dizziness or seizures. Gu: No hematuria or dysuria, Musculoskeletal: No joint inflammation or arthralgia.    Interval Events:  1/30/18: Hyperglycemia has improved. Frequency of cough decreased with copious expectoration. Hydration status has improved. Normal appetite. No dysuria    1/31/17: No new complaints, however hypokalemia has worsened but hyponatremia has improved. Patient is NPO for bronchoscopy today. Will replace potassium and then proceed with bronchoscopy. Blood glucose levels improved. Afebrile. Has productive cough but no chest pain.    2/1/18: Bronchoscopy with BAL, brushings, TBBx  RUL,JEANE was performed yesterday. Mucopurulent secretions from bot upper lobes was removed. Patient was kept in telemetry overnight. He is comfortable today morning. Denies chest  pain. Has mild cough. No hemoptysis. No wheeze. Afebrile. ID splt has changed vancomycin to Unasyn. Had hypokalemia for which potassium supplementation was done prior to bronchoscopy. Hyperglycemia uncontrolled.    2/2/18: Patient feels much better clinically. Denies chest pain , afebrile. No hemoptysis.       PFSH:  No change.    Respiratory:     Pulse Oximetry: 93 % on O2 supplementation        Exam: Not in respiratory distress  Accessory muscles of respiration are not prominent  Bilateral breath sounds audible with no wheeze or crepitations. No pleural rub.  Imaging: CT chest personally reviewed.  Bilateral upper lobe cavitary lesions thick walled with surrounding consolidation.  RML,RLL and LLL patchy infiltrates. No effusion    HemoDynamics:  Pulse: 94  Blood Pressure: 120/68       Exam: First and second heart sounds audible, tachycardia present. No murmur.        Neuro:  GCS      Exam: Alert and orientated to time place and person. No focal neurological deficit    Fluids:  Intake/Output       01/31/18 0700 - 02/01/18 0659 02/01/18 0700 - 02/02/18 0659 02/02/18 0700 - 02/03/18 0659      [id number] 1035-9591 Total 9063-9336 2409-0963 Total 3193-4931 4592-4461 Total       Intake    P.O.  100  -- 100  --  -- --  --  -- --    P.O. 100 -- 100 -- -- -- -- -- --    I.V.  --  -- --  --  320 320  --  -- --    IV Piggyback Volume -- -- -- -- 200 200 -- -- --    IV Volume -- -- -- -- 120 120 -- -- --    Total Intake 100 -- 100 -- 320 320 -- -- --       Output    Urine  --  600 600  600  1180 1780  --  -- --    Void (ml) -- 600  1780 -- -- --    Stool  --  -- --  --  -- --  --  -- --    Number of Times Stooled -- -- -- -- 1 x 1 x -- -- --    Total Output -- 600  1780 -- -- --       Net I/O     100 -600 -500 -600 -860 -1460 -- -- --           Recent Labs      01/31/18   0423  01/31/18   1130  01/31/18   1400  02/01/18   0224  02/02/18   0243   SODIUM  133*   --    --   136  135   POTASSIUM  2.9*   --    3.3*  3.0*  3.2*   CHLORIDE  101   --    --   105  103   CO2  21   --    --   21  25   BUN  11   --    --   12  7*   CREATININE  0.49*   --    --   0.52  0.53   MAGNESIUM   --   1.6   --   1.6  1.7   PHOSPHORUS   --    --    --   2.6  2.6   CALCIUM  8.2*   --    --   7.4*  7.4*       GI/Nutrition:  Exam: Oral mucosa is moist.   Abdomen is soft, non tender. Bowel sounds audible.    Liver Function  Recent Labs      18   0243   GLUCOSE  212*  221*  154*       Heme:  Recent Labs      18   1130  18   RBC  3.81*   --   3.53*   HEMOGLOBIN  11.2*   --   10.3*   HEMATOCRIT  32.9*   --   31.2*   PLATELETCT  247   --   257   IRON   --   21*   --    FERRITIN   --   1585.5*   --    TOTIRONBC   --   130*   --        Infectious Disease:  Temp  Av.2 °C (98.9 °F)  Min: 36.9 °C (98.5 °F)  Max: 37.6 °C (99.6 °F)  Micro: Blood cultures no growth yet  Sputum Gram stain: Many WBCs and Gram positive cocci  Sputum culture: Staphylococcus   BAL, Brushing and TBBx results are pending for cultures  Fungal serology: Positive for IgG coccidioides  Recent Labs      18   WBC  10.7  10.8   NEUTSPOLYS  86.10*  83.20*   LYMPHOCYTES  7.00*  8.90*   MONOCYTES  4.30  3.50   EOSINOPHILS  1.70  0.90   BASOPHILS  0.90  3.50*     Current Facility-Administered Medications   Medication Dose Frequency Provider Last Rate Last Dose   • insulin glargine (LANTUS) injection 14 Units  14 Units Q EVENING Tyson Bianchi M.D.       • potassium chloride SA (Kdur) tablet 40 mEq  40 mEq BID Tyson Bianchi M.D.   40 mEq at 18 0900   • enoxaparin (LOVENOX) inj 40 mg  40 mg DAILY Muhammad K Gondal, M.D.   40 mg at 18 0837   • NS infusion   Continuous Muhammad K Gondal, M.D. 10 mL/hr at 18 0432     • ampicillin/sulbactam (UNASYN) 3 g in  mL IVPB  3 g Q6HRS Re Bermudez M.D. 200 mL/hr at 18 0853 3 g at 18 0853   • mag  hydrox-al hydrox-simeth (MAALOX PLUS ES or MYLANTA DS) suspension 10 mL  10 mL 4X/DAY PRN Lito Giraldo M.D.   10 mL at 01/29/18 1251   • ipratropium-albuterol (DUONEB) nebulizer solution 3 mL  3 mL Q4H PRN (RT) Lito Giraldo M.D.   3 mL at 02/01/18 1733   • senna-docusate (PERICOLACE or SENOKOT S) 8.6-50 MG per tablet 2 Tab  2 Tab BID Brandon Mota M.D.   2 Tab at 01/31/18 0811    And   • polyethylene glycol/lytes (MIRALAX) PACKET 1 Packet  1 Packet QDAY PRN Brandon Mota M.D.        And   • magnesium hydroxide (MILK OF MAGNESIA) suspension 30 mL  30 mL QDAY PRN Brandon Mota M.D.        And   • bisacodyl (DULCOLAX) suppository 10 mg  10 mg QDAY PRN Brandon Mota M.D.       • Respiratory Care per Protocol   Continuous RT Brandon Mota M.D.       • acetaminophen (TYLENOL) tablet 650 mg  650 mg Q6HRS PRN Brandon Mota M.D.       • Pharmacy Consult Request ...Pain Management Review   PRN Brandon Moat M.D.        And   • oxycodone immediate-release (ROXICODONE) tablet 2.5 mg  2.5 mg Q3HRS PRLULU Mota M.D.   Stopped at 02/01/18 1535    And   • oxycodone immediate-release (ROXICODONE) tablet 5 mg  5 mg Q3HRS PRLULU Mota M.D.   5 mg at 02/02/18 0840    And   • morphine (pf) 4 mg/ml injection 2 mg  2 mg Q3HRS PRLULU Mota M.D.       • insulin regular (HUMULIN R) injection 2-9 Units  2-9 Units 4X/DAY LANI Mota M.D.   2 Units at 02/02/18 0624    And   • glucose 4 g chewable tablet 16 g  16 g Q15 MIN PRN Brandon Mota M.D.        And   • dextrose 50% (D50W) injection 25 mL  25 mL Q15 MIN PRN Brandon Mota M.D.       • ondansetron (ZOFRAN) syringe/vial injection 4 mg  4 mg Q4HRS PRN Brandon Mota M.D.       • ondansetron (ZOFRAN ODT) dispertab 4 mg  4 mg Q4HRS PRN Brandon Mota M.D.       • promethazine (PHENERGAN) tablet 12.5-25 mg  12.5-25 mg Q4HRS PRN Brandon Mota M.D.       • promethazine  (PHENERGAN) suppository 12.5-25 mg  12.5-25 mg Q4HRS PRN Brandon Mota M.D.       • prochlorperazine (COMPAZINE) injection 5-10 mg  5-10 mg Q4HRS PRN Brandon Mota M.D.       • zolpidem (AMBIEN) tablet 5 mg  5 mg HS PRN - MR X 1 Brandon Mota M.D.   5 mg at 02/01/18 2140   • ibuprofen (MOTRIN) tablet 400 mg  400 mg Q6HRS PRN Brandon Mota M.D.       • guaiFENesin (ROBITUSSIN) 100 MG/5ML solution 200 mg  10 mL Q4HRS PRN Brandon Mota M.D.   200 mg at 01/30/18 0314   • voriconazole (VFEND) tablet 200 mg  200 mg Q12HRS Donald Ga M.D.   200 mg at 02/02/18 0840     Last reviewed on 1/31/2018  3:30 PM by Evonne Trent R.N.    Quality  Measures:   Labs reviewed, Medications reviewed and Radiology images reviewed   Mei catheter: No Mei       DVT Prophylaxis: Enoxaparin (Lovenox)            Problems:  Necrotizing pneumonia bilateral upper lobes. S/p bronchoscopy  Fungal cultures and histopathology pending  Hypokalemia on potasium supplementation  Diabetes Mellitus type 2 uncontrolled      Plan:  Continue oxygen supplementation and titrate to maintain SpO2 above 94%.  Already on Voriconazole and Unasyn  Incentive spirometry  Will follow culture  Continue bronchodilators.    Management paln was discussed with patient .

## 2018-02-02 NOTE — PROGRESS NOTES
Infectious Disease Progress Note    Author: Dee Hampton M.D. Date & Time of service: 2018  9:06 AM    Chief Complaint:  Cavitary lung lesions      Interval History:  57-year-old diabetic  admitted due to worsening cough, found to have CT with bilateral upper lobe   cavitary lesions   Temp 100.8, sputum +SA Feels much better today   AF WBC 10.9 feels better-awaiting bronch   AF, WBC 10.8, denies any SOB, had bronch yesterday, no abd pain or diarrhea   AF, no CBC, upset he did not get a breathing treatment last night, had cough fit overnight, on 1 LNC  Labs Reviewed, Medications Reviewed and Radiology Reviewed.    Review of Systems:  Review of Systems   Constitutional: Negative for chills and fever.   Respiratory: Positive for cough and sputum production. Negative for hemoptysis, shortness of breath and wheezing.    Cardiovascular: Negative for chest pain.   Gastrointestinal: Negative for abdominal pain, diarrhea, nausea and vomiting.   All other systems reviewed and are negative.      Hemodynamics:  Temp (24hrs), Av.2 °C (98.9 °F), Min:36.9 °C (98.5 °F), Max:37.6 °C (99.6 °F)  Temperature: 37.3 °C (99.1 °F)  Pulse  Av.9  Min: 80  Max: 119  Blood Pressure: 120/68       Physical Exam:  Physical Exam   Constitutional: He is oriented to person, place, and time. He appears well-developed. No distress.   HENT:   Head: Normocephalic and atraumatic.   Eyes: EOM are normal. Pupils are equal, round, and reactive to light.   Neck: Neck supple.   Cardiovascular: Normal rate.    No murmur heard.  Pulmonary/Chest: Effort normal. No respiratory distress. He has no wheezes. He has rales.   Abdominal: Soft. He exhibits no distension. There is no tenderness. There is no rebound.   Musculoskeletal: He exhibits no edema.   Neurological: He is alert and oriented to person, place, and time.   Skin: No rash noted.   Nursing note and vitals reviewed.      Meds:    Current Facility-Administered  Medications:   •  insulin glargine  •  potassium chloride SA  •  enoxaparin (LOVENOX) injection  •  NS  •  ampicillin-sulbactam (UNASYN) IV  •  mag hydrox-al hydrox-simeth  •  ipratropium-albuterol  •  senna-docusate **AND** polyethylene glycol/lytes **AND** magnesium hydroxide **AND** bisacodyl  •  Respiratory Care per Protocol  •  acetaminophen  •  Notify provider if pain remains uncontrolled **AND** Use the numeric rating scale (NRS-11) on regular floors and Critical-Care Pain Observation Tool (CPOT) on ICUs/Trauma to assess pain **AND** Pulse Ox (Oximetry) **AND** Pharmacy Consult Request **AND** If patient difficult to arouse and/or has respiratory depression, stop any opiates that are currently infusing and call a Rapid Response. **AND** oxyCODONE immediate-release **AND** oxyCODONE immediate-release **AND** morphine injection  •  insulin regular **AND** Accu-Chek ACHS **AND** NOTIFY MD and PharmD **AND** glucose 4 g **AND** dextrose 50%  •  ondansetron  •  ondansetron  •  promethazine  •  promethazine  •  prochlorperazine  •  zolpidem  •  ibuprofen  •  guaiFENesin  •  voriconazole    Labs:  Recent Labs      01/31/18 0423 02/01/18 0224   WBC  10.7  10.8   RBC  3.81*  3.53*   HEMOGLOBIN  11.2*  10.3*   HEMATOCRIT  32.9*  31.2*   MCV  86.4  88.4   MCH  29.4  29.2   RDW  41.6  44.7   PLATELETCT  247  257   MPV  9.7  10.0   NEUTSPOLYS  86.10*  83.20*   LYMPHOCYTES  7.00*  8.90*   MONOCYTES  4.30  3.50   EOSINOPHILS  1.70  0.90   BASOPHILS  0.90  3.50*   RBCMORPHOLO  Present  Present     Recent Labs      01/31/18 0423 01/31/18   1400  02/01/18 0224 02/02/18   0243   SODIUM  133*   --   136  135   POTASSIUM  2.9*  3.3*  3.0*  3.2*   CHLORIDE  101   --   105  103   CO2  21   --   21  25   GLUCOSE  212*   --   221*  154*   BUN  11   --   12  7*     Recent Labs      01/31/18   0423  02/01/18   0224  02/02/18   0243   ALBUMIN   --   2.0*  1.9*   CREATININE  0.49*  0.52  0.53       Imaging:  No results  found.    Micro:  Results     Procedure Component Value Units Date/Time    GRAM STAIN [[id number]] Collected:  01/31/18 1630    Order Status:  Completed Specimen:  Respirate Updated:  02/01/18 1729     Significant Indicator .     Source RESP     Site Bronchial Brushings JEANE     Gram Stain Result No organisms seen.    Narrative:       Collected By:[id number] GONDAL MUHAMMAD K  RUL    ACID FAST STAIN [[id number]] Collected:  01/31/18 1630    Order Status:  Completed Specimen:  Respirate Updated:  02/01/18 1729     Significant Indicator NEG     Source RESP     Site Bronchial Brushings JEANE     AFB Smear Results No acid fast bacilli seen.    Narrative:       Collected By:[id number] GONDAL MUHAMMAD K  RUL    GRAM STAIN [[id number]] Collected:  01/31/18 1630    Order Status:  Completed Specimen:  Respirate Updated:  02/01/18 1729     Significant Indicator .     Source RESP     Site Bronchial Washings     Gram Stain Result --     Few WBCs.  Few Gram positive cocci.      Narrative:       Collected By:[id number] GONDAL MUHAMMAD K  JEANE    ACID FAST STAIN [[id number]] Collected:  01/31/18 1630    Order Status:  Completed Specimen:  Respirate Updated:  02/01/18 1729     Significant Indicator NEG     Source RESP     Site Bronchial Washings     AFB Smear Results No acid fast bacilli seen.    Narrative:       Collected By:[id number] GONDAL MUHAMMAD K  JEANE    AFB CULTURE [[id number]] Collected:  01/31/18 1630    Order Status:  Completed Specimen:  Respirate from Bronchoalveolar Lavage Updated:  02/01/18 1729     Significant Indicator NEG     Source RESP     Site Bronchial Brushings JEANE     AFB Culture Culture in progress.     AFB Smear Results No acid fast bacilli seen.    Narrative:       Collected By:[id number] GONDAL MUHAMMAD K  RUL    RESP CULTURE [[id number]] Collected:  01/31/18 1630    Order Status:  Completed Specimen:  Respirate from Bronchoalveolar Lavage Updated:  02/01/18 1729     Significant Indicator NEG     Source RESP     Site Bronchial  Brushings JEANE     Respiratory Culture Culture in progress.     Gram Stain Result No organisms seen.    Narrative:       Collected By:[id number] GONDAL MUHAMMAD K  RUL    FUNGAL CULTURE [[id number]] Collected:  01/31/18 1630    Order Status:  Completed Specimen:  Respirate from Bronchoalveolar Lavage Updated:  02/01/18 1729     Significant Indicator NEG     Source RESP     Site Bronchial Brushings JEANE     Fungal Culture Culture in progress.    Narrative:       Collected By:[id number] GONDAL MUHAMMAD K  RUL    GRAM STAIN [[id number]] Collected:  01/31/18 1630    Order Status:  Completed Specimen:  Respirate Updated:  02/01/18 1729     Significant Indicator .     Source RESP     Site Bronchoalveolar Lavage RUL     Gram Stain Result --     Rare WBCs.  Rare Gram positive cocci.  <5% intracellular organisms.      Narrative:       Collected By:[id number] GONDAL MUHAMMAD K  JEANE    ACID FAST STAIN [[id number]] Collected:  01/31/18 1630    Order Status:  Completed Specimen:  Respirate Updated:  02/01/18 1729     Significant Indicator NEG     Source RESP     Site Bronchoalveolar Lavage RUL     AFB Smear Results No acid fast bacilli seen.    Narrative:       Collected By:[id number] GONDAL MUHAMMAD K  JEANE    GRAM STAIN [[id number]] Collected:  01/31/18 1630    Order Status:  Completed Specimen:  Respirate Updated:  02/01/18 1729     Significant Indicator .     Source RESP     Site Bronchoalveolar Lavage JEANE     Gram Stain Result --     Many WBCs.  Moderate Gram positive cocci.  <5% intracellular organisms.      Narrative:       Collected By:[id number] GONDAL MUHAMMAD K RUL  Collected By:[id number] GONDAL MUHAMMAD K  JEANE    ACID FAST STAIN [[id number]] Collected:  01/31/18 1630    Order Status:  Completed Specimen:  Respirate Updated:  02/01/18 1729     Significant Indicator NEG     Source RESP     Site Bronchoalveolar Lavage JEANE     AFB Smear Results No acid fast bacilli seen.    Narrative:       Collected By:[id number] GONDAL MUHAMMAD K RUL  Collected  By:[id number] GONDAL MUHAMMAD K  JEANE    AFB CULTURE [[id number]] Collected:  01/31/18 1630    Order Status:  Completed Specimen:  Respirate from Bronchoalveolar Lavage Updated:  02/01/18 1728     Significant Indicator NEG     Source RESP     Site Bronchial Washings     AFB Culture Culture in progress.     AFB Smear Results No acid fast bacilli seen.    Narrative:       Collected By:[id number] VELIAAL ESPARZA K  JEANE    CULTURE RESPIRATORY W/ GRM STN [[id number]] Collected:  01/31/18 1630    Order Status:  Completed Specimen:  Respirate from Bronchoalveolar Lavage Updated:  02/01/18 1728     Significant Indicator NEG     Source RESP     Site Bronchial Washings     Respiratory Culture Culture in progress.     Gram Stain Result --     Few WBCs.  Few Gram positive cocci.      Narrative:       Collected By:[id number] GONDAL MUHAMMAD K  JEANE    FUNGAL CULTURE [[id number]] Collected:  01/31/18 1630    Order Status:  Completed Specimen:  Respirate from Bronchoalveolar Lavage Updated:  02/01/18 1728     Significant Indicator NEG     Source RESP     Site Bronchial Washings     Fungal Culture Culture in progress.    Narrative:       Collected By:[id number] GONDAL MUHAMMAD K  JEANE    AFB [[id number]] Collected:  01/31/18 1630    Order Status:  Completed Specimen:  Respirate from Bronchoalveolar Lavage Updated:  02/01/18 1728     Significant Indicator NEG     Source RESP     Site Bronchoalveolar Lavage RUL     AFB Culture Culture in progress.     AFB Smear Results No acid fast bacilli seen.    Narrative:       Collected By:[id number] GONDAL MUHAMMAD K  JEANE    FUNGAL CULTURE [[id number]] Collected:  01/31/18 1630    Order Status:  Completed Specimen:  Respirate from Bronchoalveolar Lavage Updated:  02/01/18 1728     Significant Indicator NEG     Source RESP     Site Bronchoalveolar Lavage RUL     Fungal Culture Culture in progress.    Narrative:       Collected By:[id number] GONDAL MUHAMMAD K  JEANE    QUANT BRONCHIAL WASHING [[id number]] Collected:  01/31/18 1630     Order Status:  Completed Specimen:  Respirate Updated:  02/01/18 1728     Significant Indicator NEG     Source RESP     Site Bronchoalveolar Lavage RUL     Quantitative Bronch Washing Culture in progress.     Gram Stain Result --     Rare WBCs.  Rare Gram positive cocci.  <5% intracellular organisms.      Narrative:       Collected By:[id number] GONDAL MUHAMMAD K  JEANE    AFB [[id number]] Collected:  01/31/18 1630    Order Status:  Completed Specimen:  Respirate from Bronchoalveolar Lavage Updated:  02/01/18 1728     Significant Indicator NEG     Source RESP     Site Bronchoalveolar Lavage JEANE     AFB Culture Culture in progress.     AFB Smear Results No acid fast bacilli seen.    Narrative:       Collected By:[id number] GONDAL MUHAMMAD K  RUL  Collected By:[id number] GONDAL MUHAMMAD K  JEANE    FUNGAL CULTURE [[id number]] Collected:  01/31/18 1630    Order Status:  Completed Specimen:  Respirate from Bronchoalveolar Lavage Updated:  02/01/18 1728     Significant Indicator NEG     Source RESP     Site Bronchoalveolar Lavage JEANE     Fungal Culture Culture in progress.    Narrative:       Collected By:[id number] GONDAL MUHAMMAD K  RUL  Collected By:[id number] GONDAL MUHAMMAD K  JEANE    QUANT BRONCHIAL WASHING [[id number]] Collected:  01/31/18 1630    Order Status:  Completed Specimen:  Respirate Updated:  02/01/18 1728     Significant Indicator NEG     Source RESP     Site Bronchoalveolar Lavage JEANE     Quantitative Bronch Washing Culture in progress.     Gram Stain Result --     Many WBCs.  Moderate Gram positive cocci.  <5% intracellular organisms.      Narrative:       Collected By:[id number] GONDAL MUHAMMAD K  RUL  Collected By:[id number] GONDAL MUHAMMAD K  JEANE    CULTURE RESPIRATORY W/ GRM STN [[id number]] Collected:  01/31/18 1630    Order Status:  Canceled Specimen:  Other from Bronchoalveolar Lavage     CULTURE RESPIRATORY W/ GRM STN [[id number]] Collected:  01/31/18 1630    Order Status:  Canceled Specimen:  Other from Bronchoalveolar Lavage      Culture Respiratory W/ GRM STN [[id number]]  (Abnormal)  (Susceptibility) Collected:  01/29/18 0230    Order Status:  Completed Specimen:  Respirate from Sputum Updated:  01/31/18 0746     Gram Stain Result --     Many WBCs.  Few epithelial cells.  Many Gram positive cocci.  Specimen Quality Score: 2+       Significant Indicator POS (POS)     Source RESP     Site SPUTUM     Respiratory Culture -- (A)     Respiratory Culture -- (A)     Staphylococcus aureus  Heavy growth      Narrative:       Collected By:ANTONIA MILLER  Preferably before first antibiotic dose - add Gram stain if  indicated    Culture & Susceptibility     STAPHYLOCOCCUS AUREUS     Antibiotic Sensitivity Microscan Unit Status    Ampicillin/sulbactam Sensitive <=8/4 mcg/mL Final    Clindamycin Sensitive <=0.5 mcg/mL Final    Daptomycin Sensitive 1 mcg/mL Final    Erythromycin Sensitive <=0.5 mcg/mL Final    Moxifloxacin Sensitive <=0.5 mcg/mL Final    Oxacillin Sensitive <=0.25 mcg/mL Final    Tetracycline Sensitive <=4 mcg/mL Final    Trimeth/Sulfa Sensitive <=0.5/9.5 mcg/mL Final    Vancomycin Sensitive 2 mcg/mL Final                       MRSA BY PCR (AMP) [[id number]]     Order Status:  No result Specimen:  Respirate from Nares     GRAM STAIN [[id number]] Collected:  01/29/18 0230    Order Status:  Completed Specimen:  Respirate Updated:  01/29/18 1310     Significant Indicator .     Source RESP     Site SPUTUM     Gram Stain Result --     Many WBCs.  Few epithelial cells.  Many Gram positive cocci.  Specimen Quality Score: 2+      Narrative:       Collected By:ANTONIA MILLER  Preferably before first antibiotic dose - add Gram stain if  indicated    BLOOD CULTURE [[id number]] Collected:  01/28/18 1908    Order Status:  Completed Specimen:  Blood from Peripheral Updated:  01/29/18 0840     Significant Indicator NEG     Source BLD     Site PERIPHERAL     Blood Culture --     No Growth    Note: Blood cultures are incubated for 5  "days and  are monitored continuously.Positive blood cultures  are called to the RN and reported as soon as  they are identified.      Narrative:       Per Hospital Policy: Only change Specimen Src: to \"Line\" if  specified by physician order.    BLOOD CULTURE [[id number]] Collected:  01/28/18 1908    Order Status:  Completed Specimen:  Blood from Peripheral Updated:  01/29/18 0840     Significant Indicator NEG     Source BLD     Site PERIPHERAL     Blood Culture --     No Growth    Note: Blood cultures are incubated for 5 days and  are monitored continuously.Positive blood cultures  are called to the RN and reported as soon as  they are identified.      Narrative:       Per Hospital Policy: Only change Specimen Src: to \"Line\" if  specified by physician order.          Assessment:  Active Hospital Problems    Diagnosis   • *Pneumonia [J18.9]   • Cavitary lesion of lung [J98.4]   • Hyponatremia [E87.1]   • Hypokalemia [E87.6]   • Hypocalcemia [E83.51]   • Hypoalbuminemia [E88.09]   • Normocytic anemia [D64.9]   • Type II diabetes mellitus (CMS-[facility]) [E11.9]       Plan:  Cavitary lung lesions, additional work  Low grade fever resolved  No leukocytosis  Blood cxs neg  HIV neg  Sputum +MSSA  S/p Bronch with BAL on 1/31  BAL gram stain +GPC, Cx - pending  AFB stains neg  May need JOI  Continue Unasyn and vori  Cocci serology +IgM, -IgM-continue vori    Diabetes, poorly controlled  Keep BS under 150 to help control current infection  Hegb A1C 12.5    Discussed with internal medicine   "

## 2018-02-02 NOTE — RESPIRATORY CARE
COPD EDUCATION by COPD CLINICAL EDUCATOR  2/2/2018 at 8:13 AM by Lynda Hi     Patient reviewed by COPD education team. Patient does not qualify for COPD program.

## 2018-02-02 NOTE — PROGRESS NOTES
Took over care of patient at 0700. Resting in bed, appears clean and covered. Denies significant pain.

## 2018-02-02 NOTE — PROGRESS NOTES
[facility] Hospitalist Progress Note    Date of Service: 2018    Chief Complaint  57 y.o. Male who is a  in [place]  with type II diabetes mellitus, and hypertension, admitted 2018 with fevers, chills, nausea, vomiting and cough. Initially seen at [place] ED, where he was noted to have leukocytosis, and hyponatremia, with CT scan of the chest showing multiple cavitary lung lesions in the upper lobe. Started empirically on broad-spectrum antibiotics and antifungals. ID and pulmonary was consulted. Fungal serology sent. Sputum Gram stain showed gram-positive cocci which came back as MSSA. HIV was negative. HbA1c 12, started on long-term insulin. Her low potassium, and low normal magnesium, which were both replaced. S/p FOB and BAL, which showed bilateral purulent secretions in the right upper lobe and left upper lobe. BAL bacterial fungal cultures in progress. ID changed antibiotics to unasyn.       Interval Problem Update  2018 - uneventful night. VSS. Afebrile. Saturating well, now on RA.  Potassium 3.2. Procalcitonin improved to 0.55. Magnesium improved to 1.7. . He was able to ambulate, with transient and mild desaturations.    > Seen and examined. Feeling much better, in good spirits. Still with occasional cough with yellowish phlegm. Breathing better. No CP, nausea, vomiting, abd pain, diarrhea.       Consultants/Specialty  Pulmonology  ID    Disposition  Monitor on the floors.        ROS     Pertinent positives/negatives as mentioned above.     A complete review of systems was done. All other systems were negative.      Physical Exam  Laboratory/Imaging   Hemodynamics  Temp (24hrs), Av.2 °C (98.9 °F), Min:36.9 °C (98.5 °F), Max:37.6 °C (99.6 °F)   Temperature: 37.1 °C (98.8 °F)  Pulse  Av.1  Min: 80  Max: 119   Blood Pressure: 126/75      Respiratory      Respiration: 20, Pulse Oximetry: 92 %     Work Of Breathing / Effort: Moderate  RUL Breath Sounds: Clear, RML Breath  Sounds: Diminished, RLL Breath Sounds: Clear, JEANE Breath Sounds: Diminished;Fine Crackles, LLL Breath Sounds: Diminished    Fluids    Intake/Output Summary (Last 24 hours) at 02/02/18 0725  Last data filed at 02/02/18 0500   Gross per 24 hour   Intake              320 ml   Output             1780 ml   Net            -1460 ml       Nutrition  Orders Placed This Encounter   Procedures   • DIET ORDER     Standing Status:   Standing     Number of Occurrences:   1     Order Specific Question:   Diet:     Answer:   Diabetic [3]     Physical Exam   Constitutional: He is oriented to person, place, and time. He appears well-developed. No distress.   HENT:   Head: Normocephalic and atraumatic.   Mouth/Throat: Oropharynx is clear and moist. No oropharyngeal exudate.   Eyes: EOM are normal. Pupils are equal, round, and reactive to light. Right eye exhibits no discharge. Left eye exhibits no discharge. No scleral icterus.   Neck: Normal range of motion. Neck supple. No thyromegaly present.   Cardiovascular: Normal rate and regular rhythm.  Exam reveals no gallop and no friction rub.    No murmur heard.  Pulmonary/Chest: Effort normal and breath sounds normal. No respiratory distress. He has no wheezes. He has no rales. He exhibits no tenderness.   Abdominal: Soft. Bowel sounds are normal. There is no tenderness. There is no rebound and no guarding.   Musculoskeletal: Normal range of motion. He exhibits no edema or tenderness.   Lymphadenopathy:     He has no cervical adenopathy.   Neurological: He is alert and oriented to person, place, and time. No cranial nerve deficit. Coordination normal.   Skin: Skin is warm and dry. No rash noted. He is not diaphoretic. No erythema.   Psychiatric: He has a normal mood and affect. His behavior is normal. Judgment and thought content normal.   Vitals reviewed.      Recent Labs      01/31/18   0423  02/01/18   0224   WBC  10.7  10.8   RBC  3.81*  3.53*   HEMOGLOBIN  11.2*  10.3*   HEMATOCRIT   32.9*  31.2*   MCV  86.4  88.4   MCH  29.4  29.2   MCHC  34.0  33.0*   RDW  41.6  44.7   PLATELETCT  247  257   MPV  9.7  10.0     Recent Labs      01/31/18   0423  01/31/18   1400  02/01/18   0224  02/02/18   0243   SODIUM  133*   --   136  135   POTASSIUM  2.9*  3.3*  3.0*  3.2*   CHLORIDE  101   --   105  103   CO2  21   --   21  25   GLUCOSE  212*   --   221*  154*   BUN  11   --   12  7*   CREATININE  0.49*   --   0.52  0.53   CALCIUM  8.2*   --   7.4*  7.4*                      Assessment/Plan     * Necrotizing pneumonia due to MSSA (CMS-HCC)- (present on admission)   Assessment & Plan    - Sputum culture growing staph aureus/MSSA, which could certainly cause cavitary/necrotizing pneumonia. Awaiting fungal cultures of BAL.    - Continue Unasyn for MSSA, and empiric voriconazole. Will follow BAL cultures. ID on-board.   - follow BAL cytology.   - Continue respiratory support per RT protocol. Continue oxygen supplements, wean as able and as tolerated.        Acute respiratory failure with hypoxia due to necrotizing pneumonia (CMS-HCC)- (present on admission)   Assessment & Plan    - Continue antibiotics, and empiric antifungal for necrotizing pneumonia. Continue respiratory support with PRN bronchodilators, and continue oxygen supplement to keep saturations above 88%.        Cavitary lesion of lung- (present on admission)   Assessment & Plan    - Likely necrotizing pneumonia from staph aureus. Awaiting fungal culture of BAL. Follow cytology and cultures.  - Continue antibiotics and antifungals.        Hypocalcemia- (present on admission)   Assessment & Plan    - Corrected calcium is 10.6 (normal).         Hypokalemia- (present on admission)   Assessment & Plan    - Continue 40 mEq oral K Dur BID. Continue to monitor level, BMP in AM.        Hyponatremia- (present on admission)   Assessment & Plan    - Likely due to SIADH from lung process. Sodium normalized.  - Continue to monitor sodium levels.         Uncontrolled type II diabetes mellitus (CMS-HCC)- (present on admission)   Assessment & Plan    - HbA1c 12.5.  - Still with room to improve. Increase Lantus to 14 units at HS, goal FBG< 150. Continue to hold metformin and Victoza for now, resume on discharge.        Hypoalbuminemia- (present on admission)   Assessment & Plan    - Continue to monitor.        Normocytic anemia- (present on admission)   Assessment & Plan    - Likely anemia of chronic disease, with markedly elevated ferritin.   - Continue to monitor, transfuse for hemoglobin less than 7.            Reviewed items::  Labs reviewed, Medications reviewed and Radiology images reviewed  Mei catheter::  No Mei  DVT prophylaxis pharmacological::  Enoxaparin (Lovenox)  Ulcer Prophylaxis::  Not indicated  Antibiotics:  Treating active infection/contamination beyond 24 hours perioperative coverage

## 2018-02-02 NOTE — PROGRESS NOTES
Bedside report received, pt care assumed, tele box on. VSS, pt assessment complete. Pt aaox4, no signs of distress noted at this time. POC discussed with pt and verbalizes no questions. Pt reports 2/10 pain in his upper back but declines interventions at this time. Pt denies any additional needs at this time. Bed in lowest position, pt educated on fall risk and verbalized understanding, call light within reach, hourly rounding in place.

## 2018-02-02 NOTE — CARE PLAN
Problem: Respiratory:  Goal: Respiratory status will improve  Outcome: PROGRESSING AS EXPECTED  Continuous pulse ox in place, pt encouraged to use IS, pt sitting greater than 30 degrees in bed.    Problem: Pain Management  Goal: Pain level will decrease to patient's comfort goal  Outcome: PROGRESSING AS EXPECTED  Pt receiving prn pain medication as ordered per MD. Pt also using distraction to decrease pain.

## 2018-02-03 ENCOUNTER — APPOINTMENT (OUTPATIENT)
Dept: RADIOLOGY | Facility: MEDICAL CENTER | Age: 58
DRG: 166 | End: 2018-02-03
Attending: INTERNAL MEDICINE
Payer: OTHER MISCELLANEOUS

## 2018-02-03 LAB
ALBUMIN SERPL BCP-MCNC: 2.2 G/DL (ref 3.2–4.9)
ANION GAP SERPL CALC-SCNC: 9 MMOL/L (ref 0–11.9)
BACTERIA BRONCH AEROBE CULT: ABNORMAL
BACTERIA SPEC RESP CULT: ABNORMAL
BUN SERPL-MCNC: 6 MG/DL (ref 8–22)
CALCIUM SERPL-MCNC: 7.7 MG/DL (ref 8.5–10.5)
CHLORIDE SERPL-SCNC: 102 MMOL/L (ref 96–112)
CO2 SERPL-SCNC: 23 MMOL/L (ref 20–33)
CREAT SERPL-MCNC: 0.63 MG/DL (ref 0.5–1.4)
GLUCOSE BLD-MCNC: 147 MG/DL (ref 65–99)
GLUCOSE BLD-MCNC: 150 MG/DL (ref 65–99)
GLUCOSE BLD-MCNC: 186 MG/DL (ref 65–99)
GLUCOSE BLD-MCNC: 253 MG/DL (ref 65–99)
GLUCOSE SERPL-MCNC: 131 MG/DL (ref 65–99)
GRAM STN SPEC: ABNORMAL
PHOSPHATE SERPL-MCNC: 2.8 MG/DL (ref 2.5–4.5)
POTASSIUM SERPL-SCNC: 3.8 MMOL/L (ref 3.6–5.5)
SIGNIFICANT IND 70042: ABNORMAL
SITE SITE: ABNORMAL
SODIUM SERPL-SCNC: 134 MMOL/L (ref 135–145)
SOURCE SOURCE: ABNORMAL

## 2018-02-03 PROCEDURE — 94668 MNPJ CHEST WALL SBSQ: CPT

## 2018-02-03 PROCEDURE — 770020 HCHG ROOM/CARE - TELE (206)

## 2018-02-03 PROCEDURE — 82962 GLUCOSE BLOOD TEST: CPT

## 2018-02-03 PROCEDURE — 700105 HCHG RX REV CODE 258: Performed by: INTERNAL MEDICINE

## 2018-02-03 PROCEDURE — A9270 NON-COVERED ITEM OR SERVICE: HCPCS | Performed by: HOSPITALIST

## 2018-02-03 PROCEDURE — 700111 HCHG RX REV CODE 636 W/ 250 OVERRIDE (IP): Performed by: INTERNAL MEDICINE

## 2018-02-03 PROCEDURE — 700102 HCHG RX REV CODE 250 W/ 637 OVERRIDE(OP): Performed by: HOSPITALIST

## 2018-02-03 PROCEDURE — 94667 MNPJ CHEST WALL 1ST: CPT

## 2018-02-03 PROCEDURE — 71046 X-RAY EXAM CHEST 2 VIEWS: CPT

## 2018-02-03 PROCEDURE — 36415 COLL VENOUS BLD VENIPUNCTURE: CPT

## 2018-02-03 PROCEDURE — 700102 HCHG RX REV CODE 250 W/ 637 OVERRIDE(OP): Performed by: INTERNAL MEDICINE

## 2018-02-03 PROCEDURE — A9270 NON-COVERED ITEM OR SERVICE: HCPCS | Performed by: INTERNAL MEDICINE

## 2018-02-03 PROCEDURE — 94669 MECHANICAL CHEST WALL OSCILL: CPT

## 2018-02-03 PROCEDURE — 99232 SBSQ HOSP IP/OBS MODERATE 35: CPT | Performed by: INTERNAL MEDICINE

## 2018-02-03 PROCEDURE — 94760 N-INVAS EAR/PLS OXIMETRY 1: CPT

## 2018-02-03 PROCEDURE — 80069 RENAL FUNCTION PANEL: CPT

## 2018-02-03 RX ADMIN — SODIUM CHLORIDE: 900 INJECTION INTRAVENOUS at 20:37

## 2018-02-03 RX ADMIN — AMPICILLIN SODIUM AND SULBACTAM SODIUM 3 G: 2; 1 INJECTION, POWDER, FOR SOLUTION INTRAMUSCULAR; INTRAVENOUS at 09:11

## 2018-02-03 RX ADMIN — POTASSIUM CHLORIDE 40 MEQ: 1500 TABLET, EXTENDED RELEASE ORAL at 20:35

## 2018-02-03 RX ADMIN — INSULIN GLARGINE 14 UNITS: 100 INJECTION, SOLUTION SUBCUTANEOUS at 20:42

## 2018-02-03 RX ADMIN — OXYCODONE HYDROCHLORIDE 5 MG: 5 TABLET ORAL at 09:11

## 2018-02-03 RX ADMIN — ENOXAPARIN SODIUM 40 MG: 100 INJECTION SUBCUTANEOUS at 09:11

## 2018-02-03 RX ADMIN — OXYCODONE HYDROCHLORIDE 5 MG: 5 TABLET ORAL at 16:59

## 2018-02-03 RX ADMIN — AMPICILLIN SODIUM AND SULBACTAM SODIUM 3 G: 2; 1 INJECTION, POWDER, FOR SOLUTION INTRAMUSCULAR; INTRAVENOUS at 04:27

## 2018-02-03 RX ADMIN — GUAIFENESIN 200 MG: 100 SOLUTION ORAL at 07:04

## 2018-02-03 RX ADMIN — GUAIFENESIN 200 MG: 100 SOLUTION ORAL at 23:14

## 2018-02-03 RX ADMIN — GUAIFENESIN 200 MG: 100 SOLUTION ORAL at 12:48

## 2018-02-03 RX ADMIN — AMPICILLIN SODIUM AND SULBACTAM SODIUM 3 G: 2; 1 INJECTION, POWDER, FOR SOLUTION INTRAMUSCULAR; INTRAVENOUS at 20:00

## 2018-02-03 RX ADMIN — GUAIFENESIN 200 MG: 100 SOLUTION ORAL at 16:57

## 2018-02-03 RX ADMIN — OXYCODONE HYDROCHLORIDE 5 MG: 5 TABLET ORAL at 23:14

## 2018-02-03 RX ADMIN — VORICONAZOLE 200 MG: 200 TABLET, FILM COATED ORAL at 10:48

## 2018-02-03 RX ADMIN — POTASSIUM CHLORIDE 40 MEQ: 1500 TABLET, EXTENDED RELEASE ORAL at 09:10

## 2018-02-03 RX ADMIN — VORICONAZOLE 200 MG: 200 TABLET, FILM COATED ORAL at 20:36

## 2018-02-03 RX ADMIN — INSULIN HUMAN 2 UNITS: 100 INJECTION, SOLUTION PARENTERAL at 20:39

## 2018-02-03 RX ADMIN — AMPICILLIN SODIUM AND SULBACTAM SODIUM 3 G: 2; 1 INJECTION, POWDER, FOR SOLUTION INTRAMUSCULAR; INTRAVENOUS at 17:11

## 2018-02-03 RX ADMIN — INSULIN HUMAN 5 UNITS: 100 INJECTION, SOLUTION PARENTERAL at 17:41

## 2018-02-03 ASSESSMENT — PAIN SCALES - GENERAL
PAINLEVEL_OUTOF10: 5
PAINLEVEL_OUTOF10: 3
PAINLEVEL_OUTOF10: 0
PAINLEVEL_OUTOF10: 4
PAINLEVEL_OUTOF10: 2
PAINLEVEL_OUTOF10: 2
PAINLEVEL_OUTOF10: 0

## 2018-02-03 ASSESSMENT — COPD QUESTIONNAIRES
HAVE YOU SMOKED AT LEAST 100 CIGARETTES IN YOUR ENTIRE LIFE: NO/DON'T KNOW
DO YOU EVER COUGH UP ANY MUCUS OR PHLEGM?: NO/ONLY WITH OCCASIONAL COLDS OR INFECTIONS
DURING THE PAST 4 WEEKS HOW MUCH DID YOU FEEL SHORT OF BREATH: NONE/LITTLE OF THE TIME
COPD SCREENING SCORE: 1

## 2018-02-03 ASSESSMENT — ENCOUNTER SYMPTOMS
NAUSEA: 0
CHILLS: 0
HEMOPTYSIS: 0
DIARRHEA: 0
FEVER: 0
VOMITING: 0
SPUTUM PRODUCTION: 1
COUGH: 1
ABDOMINAL PAIN: 0
WHEEZING: 0
SHORTNESS OF BREATH: 0

## 2018-02-03 ASSESSMENT — LIFESTYLE VARIABLES: EVER_SMOKED: YES

## 2018-02-03 NOTE — PROGRESS NOTES
Infectious Disease Progress Note    Author: Dee Hampton M.D. Date & Time of service: 2/3/2018  9:59 AM    Chief Complaint:  Cavitary lung lesions      Interval History:  57-year-old diabetic  admitted due to worsening cough, found to have CT with bilateral upper lobe   cavitary lesions   Temp 100.8, sputum +SA Feels much better today   AF WBC 10.9 feels better-awaiting bronch   AF, WBC 10.8, denies any SOB, had bronch yesterday, no abd pain or diarrhea   AF, no CBC, upset he did not get a breathing treatment last night, had cough fit overnight, on 1 LNC  2/3 AF, no CBC, on room air, breathing better with decreased sputum production  Labs Reviewed, Medications Reviewed and Radiology Reviewed.    Review of Systems:  Review of Systems   Constitutional: Negative for chills and fever.   Respiratory: Positive for cough and sputum production. Negative for hemoptysis, shortness of breath and wheezing.         Decreased   Cardiovascular: Negative for chest pain.   Gastrointestinal: Negative for abdominal pain, diarrhea, nausea and vomiting.   All other systems reviewed and are negative.      Hemodynamics:  Temp (24hrs), Av.9 °C (98.5 °F), Min:36.5 °C (97.7 °F), Max:37.4 °C (99.3 °F)  Temperature: 36.7 °C (98.1 °F)  Pulse  Av.8  Min: 80  Max: 119  Blood Pressure: 145/78       Physical Exam:  Physical Exam   Constitutional: He is oriented to person, place, and time. He appears well-developed. No distress.   HENT:   Head: Normocephalic and atraumatic.   Eyes: EOM are normal. Pupils are equal, round, and reactive to light.   Neck: Neck supple.   Cardiovascular: Normal rate.    No murmur heard.  Pulmonary/Chest: Effort normal. No respiratory distress. He has no wheezes. He has rales.   Abdominal: Soft. He exhibits no distension. There is no tenderness. There is no rebound.   Musculoskeletal: He exhibits no edema.   Neurological: He is alert and oriented to person, place, and time.   Skin:  No rash noted.   Nursing note and vitals reviewed.      Meds:    Current Facility-Administered Medications:   •  insulin glargine  •  potassium chloride SA  •  enoxaparin (LOVENOX) injection  •  NS  •  ampicillin-sulbactam (UNASYN) IV  •  mag hydrox-al hydrox-simeth  •  ipratropium-albuterol  •  senna-docusate **AND** polyethylene glycol/lytes **AND** magnesium hydroxide **AND** bisacodyl  •  Respiratory Care per Protocol  •  acetaminophen  •  Notify provider if pain remains uncontrolled **AND** Use the numeric rating scale (NRS-11) on regular floors and Critical-Care Pain Observation Tool (CPOT) on ICUs/Trauma to assess pain **AND** Pulse Ox (Oximetry) **AND** Pharmacy Consult Request **AND** If patient difficult to arouse and/or has respiratory depression, stop any opiates that are currently infusing and call a Rapid Response. **AND** oxyCODONE immediate-release **AND** oxyCODONE immediate-release **AND** morphine injection  •  insulin regular **AND** Accu-Chek ACHS **AND** NOTIFY MD and PharmD **AND** glucose 4 g **AND** dextrose 50%  •  ondansetron  •  ondansetron  •  promethazine  •  promethazine  •  prochlorperazine  •  zolpidem  •  ibuprofen  •  guaiFENesin  •  voriconazole    Labs:  Recent Labs      02/01/18 0224   WBC  10.8   RBC  3.53*   HEMOGLOBIN  10.3*   HEMATOCRIT  31.2*   MCV  88.4   MCH  29.2   RDW  44.7   PLATELETCT  257   MPV  10.0   NEUTSPOLYS  83.20*   LYMPHOCYTES  8.90*   MONOCYTES  3.50   EOSINOPHILS  0.90   BASOPHILS  3.50*   RBCMORPHOLO  Present     Recent Labs      02/01/18 0224 02/02/18 0243 02/03/18   0305   SODIUM  136  135  134*   POTASSIUM  3.0*  3.2*  3.8   CHLORIDE  105  103  102   CO2  21  25  23   GLUCOSE  221*  154*  131*   BUN  12  7*  6*     Recent Labs      02/01/18 0224 02/02/18 0243 02/03/18   0305   ALBUMIN  2.0*  1.9*  2.2*   CREATININE  0.52  0.53  0.63       Imaging:  No results found.    Micro:  Results     Procedure Component Value Units Date/Time    BLOOD  "CULTURE [[id number]] Collected:  01/28/18 1908    Order Status:  Completed Specimen:  Blood from Peripheral Updated:  02/02/18 2100     Significant Indicator NEG     Source BLD     Site PERIPHERAL     Blood Culture No growth after 5 days of incubation.    Narrative:       Per Hospital Policy: Only change Specimen Src: to \"Line\" if  specified by physician order.    BLOOD CULTURE [[id number]] Collected:  01/28/18 1908    Order Status:  Completed Specimen:  Blood from Peripheral Updated:  02/02/18 2100     Significant Indicator NEG     Source BLD     Site PERIPHERAL     Blood Culture No growth after 5 days of incubation.    Narrative:       Per Hospital Policy: Only change Specimen Src: to \"Line\" if  specified by physician order.    AFB [[id number]] Collected:  01/31/18 1630    Order Status:  Completed Specimen:  Respirate from Bronchoalveolar Lavage Updated:  02/02/18 1103     Significant Indicator NEG     Source RESP     Site Bronchoalveolar Lavage RUL     AFB Culture Culture in progress.     AFB Smear Results No acid fast bacilli seen.    Narrative:       Collected By:[id number] GONDAL MUHAMMAD K  JEANE    FUNGAL CULTURE [[id number]] Collected:  01/31/18 1630    Order Status:  Completed Specimen:  Respirate from Bronchoalveolar Lavage Updated:  02/02/18 1103     Significant Indicator NEG     Source RESP     Site Bronchoalveolar Lavage RUL     Fungal Culture Culture in progress.    Narrative:       Collected By:[id number] GONDAL MUHAMMAD K  JEANE    QUANT BRONCHIAL WASHING [[id number]]  (Abnormal) Collected:  01/31/18 1630    Order Status:  Completed Specimen:  Respirate Updated:  02/02/18 1103     Significant Indicator POS (POS)     Source RESP     Site Bronchoalveolar Lavage RUL     Quantitative Bronch Washing -- (A)     Growth noted after further incubation,see below for  organism identification.       Gram Stain Result --     Rare WBCs.  Rare Gram positive cocci.  <5% intracellular organisms.       Quantitative Bronch Washing -- " (A)     Staphylococcus aureus  10,000 cfu/mL  See previous culture for sensitivity report.      Narrative:       Collected By:[id number] GONDAL MUHAMMAD K  JEANE    FUNGAL CULTURE [[id number]] Collected:  01/31/18 1630    Order Status:  Completed Specimen:  Respirate from Bronchoalveolar Lavage Updated:  02/02/18 1102     Significant Indicator NEG     Source RESP     Site Bronchial Washings     Fungal Culture Culture in progress.    Narrative:       Collected By:[id number] VELIAAL VILMA K  JEANE    AFB CULTURE [[id number]] Collected:  01/31/18 1630    Order Status:  Completed Specimen:  Respirate from Bronchoalveolar Lavage Updated:  02/02/18 1102     Significant Indicator NEG     Source RESP     Site Bronchial Washings     AFB Culture Culture in progress.     AFB Smear Results No acid fast bacilli seen.    Narrative:       Collected By:[id number] GONDAL MUHAMMAD K  JEANE    CULTURE RESPIRATORY W/ GRM STN [[id number]]  (Abnormal) Collected:  01/31/18 1630    Order Status:  Completed Specimen:  Respirate from Bronchoalveolar Lavage Updated:  02/02/18 1102     Significant Indicator POS (POS)     Source RESP     Site Bronchial Washings     Respiratory Culture -- (A)     Growth noted after further incubation,see below for  organism identification.       Gram Stain Result --     Few WBCs.  Few Gram positive cocci.       Respiratory Culture -- (A)     Staphylococcus aureus  Light growth  See previous culture for sensitivity report.      Narrative:       Collected By:[id number] GONDAL MUHAMMAD K  JEANE    AFB [[id number]] Collected:  01/31/18 1630    Order Status:  Completed Specimen:  Respirate from Bronchoalveolar Lavage Updated:  02/02/18 1101     Significant Indicator NEG     Source RESP     Site Bronchoalveolar Lavage JEANE     AFB Culture Culture in progress.     AFB Smear Results No acid fast bacilli seen.    Narrative:       Collected By:[id number] GONDAL MUHAMMAD K  RUL  Collected By:[id number] GONDAL MUHAMMAD K  JEANE    FUNGAL CULTURE [[id number]]  Collected:  01/31/18 1630    Order Status:  Completed Specimen:  Respirate from Bronchoalveolar Lavage Updated:  02/02/18 1101     Significant Indicator NEG     Source RESP     Site Bronchoalveolar Lavage JEANE     Fungal Culture Culture in progress.    Narrative:       Collected By:[id number] GONDAL MUHAMMAD K  RUL  Collected By:[id number] GONDAL MUHAMMAD K  JEANE    QUANT BRONCHIAL WASHING [[id number]]  (Abnormal) Collected:  01/31/18 1630    Order Status:  Completed Specimen:  Respirate Updated:  02/02/18 1101     Significant Indicator POS (POS)     Source RESP     Site Bronchoalveolar Lavage JEANE     Quantitative Bronch Washing -- (A)     Growth noted after further incubation,see below for  organism identification.       Gram Stain Result --     Many WBCs.  Moderate Gram positive cocci.  <5% intracellular organisms.       Quantitative Bronch Washing -- (A)     Staphylococcus aureus  15,000 cfu/mL      Narrative:       Collected By:[id number] GONDAL MUHAMMAD K  RUL  Collected By:[id number] GONDAL MUHAMMAD K  JEANE    AFB CULTURE [[id number]] Collected:  01/31/18 1630    Order Status:  Completed Specimen:  Respirate from Bronchoalveolar Lavage Updated:  02/02/18 1054     Significant Indicator NEG     Source RESP     Site Bronchial Brushings JEANE     AFB Culture Culture in progress.     AFB Smear Results No acid fast bacilli seen.    Narrative:       Collected By:[id number] GONDAL MUHAMMAD K  RUL    RESP CULTURE [[id number]] Collected:  01/31/18 1630    Order Status:  Completed Specimen:  Respirate from Bronchoalveolar Lavage Updated:  02/02/18 1054     Gram Stain Result No organisms seen.     Significant Indicator NEG     Source RESP     Site Bronchial Brushings JEANE     Respiratory Culture No growth at 48 hours.    Narrative:       Collected By:[id number] GONDAL MUHAMMAD K  RUL    FUNGAL CULTURE [[id number]] Collected:  01/31/18 1630    Order Status:  Completed Specimen:  Respirate from Bronchoalveolar Lavage Updated:  02/02/18 1054     Significant  Indicator NEG     Source RESP     Site Bronchial Brushings JEANE     Fungal Culture Culture in progress.    Narrative:       Collected By:[id number] GONDAL ESPARZA K  RUL    GRAM STAIN [[id number]] Collected:  01/31/18 1630    Order Status:  Completed Specimen:  Respirate Updated:  02/01/18 1729     Significant Indicator .     Source RESP     Site Bronchial Brushings JEANE     Gram Stain Result No organisms seen.    Narrative:       Collected By:[id number] GONDAL ESPARZA K  RUL    ACID FAST STAIN [[id number]] Collected:  01/31/18 1630    Order Status:  Completed Specimen:  Respirate Updated:  02/01/18 1729     Significant Indicator NEG     Source RESP     Site Bronchial Brushings JEANE     AFB Smear Results No acid fast bacilli seen.    Narrative:       Collected By:[id number] GONDAL ESPARZA K  RUL    GRAM STAIN [[id number]] Collected:  01/31/18 1630    Order Status:  Completed Specimen:  Respirate Updated:  02/01/18 1729     Significant Indicator .     Source RESP     Site Bronchial Washings     Gram Stain Result --     Few WBCs.  Few Gram positive cocci.      Narrative:       Collected By:[id number] GONDAL ESPARZA K  JEANE    ACID FAST STAIN [[id number]] Collected:  01/31/18 1630    Order Status:  Completed Specimen:  Respirate Updated:  02/01/18 1729     Significant Indicator NEG     Source RESP     Site Bronchial Washings     AFB Smear Results No acid fast bacilli seen.    Narrative:       Collected By:[id number] GONDAL ESPARZA K  JEANE    GRAM STAIN [[id number]] Collected:  01/31/18 1630    Order Status:  Completed Specimen:  Respirate Updated:  02/01/18 1729     Significant Indicator .     Source RESP     Site Bronchoalveolar Lavage RUL     Gram Stain Result --     Rare WBCs.  Rare Gram positive cocci.  <5% intracellular organisms.      Narrative:       Collected By:[id number] GONDAL ESPARZA K  JEANE    ACID FAST STAIN [[id number]] Collected:  01/31/18 1630    Order Status:  Completed Specimen:  Respirate Updated:  02/01/18 1729     Significant  Indicator NEG     Source RESP     Site Bronchoalveolar Lavage RUL     AFB Smear Results No acid fast bacilli seen.    Narrative:       Collected By:[id number] GONDAL ESPARZA K  JEANE    GRAM STAIN [[id number]] Collected:  01/31/18 1630    Order Status:  Completed Specimen:  Respirate Updated:  02/01/18 1729     Significant Indicator .     Source RESP     Site Bronchoalveolar Lavage JEANE     Gram Stain Result --     Many WBCs.  Moderate Gram positive cocci.  <5% intracellular organisms.      Narrative:       Collected By:[id number] GONDAL ESPARZA K  RUL  Collected By:[id number] GONDAL ESPARZA K  JEANE    ACID FAST STAIN [[id number]] Collected:  01/31/18 1630    Order Status:  Completed Specimen:  Respirate Updated:  02/01/18 1729     Significant Indicator NEG     Source RESP     Site Bronchoalveolar Lavage JEANE     AFB Smear Results No acid fast bacilli seen.    Narrative:       Collected By:[id number] GONDAL ESPARZA K  RUL  Collected By:[id number] GONDAL ESPARZA K  JEANE    CULTURE RESPIRATORY W/ GRM STN [[id number]] Collected:  01/31/18 1630    Order Status:  Canceled Specimen:  Other from Bronchoalveolar Lavage     CULTURE RESPIRATORY W/ GRM STN [[id number]] Collected:  01/31/18 1630    Order Status:  Canceled Specimen:  Other from Bronchoalveolar Lavage     Culture Respiratory W/ GRM STN [[id number]]  (Abnormal)  (Susceptibility) Collected:  01/29/18 0230    Order Status:  Completed Specimen:  Respirate from Sputum Updated:  01/31/18 0746     Gram Stain Result --     Many WBCs.  Few epithelial cells.  Many Gram positive cocci.  Specimen Quality Score: 2+       Significant Indicator POS (POS)     Source RESP     Site SPUTUM     Respiratory Culture -- (A)     Respiratory Culture -- (A)     Staphylococcus aureus  Heavy growth      Narrative:       Collected By:ANTONIA Thompson before first antibiotic dose - add Gram stain if  indicated    Culture & Susceptibility     STAPHYLOCOCCUS AUREUS     Antibiotic Sensitivity  Microscan Unit Status    Ampicillin/sulbactam Sensitive <=8/4 mcg/mL Final    Clindamycin Sensitive <=0.5 mcg/mL Final    Daptomycin Sensitive 1 mcg/mL Final    Erythromycin Sensitive <=0.5 mcg/mL Final    Moxifloxacin Sensitive <=0.5 mcg/mL Final    Oxacillin Sensitive <=0.25 mcg/mL Final    Tetracycline Sensitive <=4 mcg/mL Final    Trimeth/Sulfa Sensitive <=0.5/9.5 mcg/mL Final    Vancomycin Sensitive 2 mcg/mL Final                       MRSA BY PCR (AMP) [[id number]]     Order Status:  No result Specimen:  Respirate from Nares     GRAM STAIN [[id number]] Collected:  01/29/18 0230    Order Status:  Completed Specimen:  Respirate Updated:  01/29/18 1310     Significant Indicator .     Source RESP     Site SPUTUM     Gram Stain Result --     Many WBCs.  Few epithelial cells.  Many Gram positive cocci.  Specimen Quality Score: 2+      Narrative:       Collected By:ANTONIA MILLER  Preferably before first antibiotic dose - add Gram stain if  indicated          Assessment:  Active Hospital Problems    Diagnosis   • *Pneumonia [J18.9]   • Cavitary lesion of lung [J98.4]   • Hyponatremia [E87.1]   • Hypokalemia [E87.6]   • Hypocalcemia [E83.51]   • Hypoalbuminemia [E88.09]   • Normocytic anemia [D64.9]   • Type II diabetes mellitus (CMS-HCC) [E11.9]       Plan:  Cavitary lung lesions, additional work  Low grade fever resolved  No leukocytosis  Blood cxs neg  HIV neg  Sputum +MSSA  S/p Bronch with BAL on 1/31  BAL gram stain +GPC, Cx - MSSA  AFB stains neg  May need JOI  Continue Unasyn and vori  Cocci serology +IgM, -IgM-continue vori  Anticipate at least 2 weeks of IV abx followed by PO abx  PICC ordered    Diabetes, poorly controlled  Keep BS under 150 to help control current infection  Hgb A1C 12.5    Discussed with internal medicine

## 2018-02-03 NOTE — PROGRESS NOTES
Report received at bedside from RN. Patient A & O x 4. Patient resting in bed. No acute distress. Patient complains of pain 5/10 in back. Medicated per MAR. No behavioral pain indicators noted.  No SOB/dyspnea noted. No cough noted. Patient denies chest pain/palpitations.  Patient denies nausea. Abdomen: nondistended, nontender. + bowel sounds in all quadrants. Patient passing flatus.  Skin: Intact Activity: UP self   Fall and safety precautions maintained.Hourly rounding in progress.

## 2018-02-03 NOTE — CARE PLAN
Problem: Communication  Goal: The ability to communicate needs accurately and effectively will improve  Outcome: PROGRESSING AS EXPECTED  White board updated with plan of care during bedside report.     Problem: Pain Management  Goal: Pain level will decrease to patient's comfort goal  Outcome: PROGRESSING AS EXPECTED  Patient medicated PRN per MAR.

## 2018-02-03 NOTE — PROGRESS NOTES
Patient did well today. He ambulated in the [place] on room air and was able to maintain a sat at 87%. He did not get SOB. At rest the patient is around 93% on Ra. He still requests breathing treatments from RT and has intense coughing spells where he coughs up think yellow and brown sputum. His lung bases sounded more consolidated bilaterally today. He denied significant complaints and stated feeling better in general. He had a shower. Will continue to monitor.

## 2018-02-03 NOTE — PROGRESS NOTES
Pulmonary Critical Care Progress Note        Chief Complaint: Inpatient follow up for productive cough, fever    History of Present Illness: 57 y.o. male with history of DM type 2 presented to the ER as a transfer from [place]. He had symptoms of productive cough for 02 weeks duration accompanied by fever and sweating off and on. Expectoration is greenish brown. No hemoptysis.   Patient is a  and reports being exposed to sick contacts on a regular basis. He also has environmental exposure to dust, smoke and chemical toxins. He reports history of pneumonia twice previously. CT chest shows bilateral upper lobe consolidation with necrotic cavitary lesion and bilateral lower lobe patchy densities. Currently being treated empirically with antibiotics and antifungals. He hypokalemia and hyponatremia and hyperglycemia.    ROS: Constitutional: Fever off and on, with sweating, generalized weakness: Respiratory: Dyspnea, productive cough, no pleuritic chest pain. No wheeze, Cardiac: Denies palpitations, angina, orthopnea or paroxysmal nocturnal dyspnea , GI: No nausea, vomiting abdominal pain.  All other systems negative. Neuro: Denies headache, dizziness or seizures. Gu: No hematuria or dysuria, Musculoskeletal: No joint inflammation or arthralgia.    Interval Events:  1/30/18: Hyperglycemia has improved. Frequency of cough decreased with copious expectoration. Hydration status has improved. Normal appetite. No dysuria    1/31/17: No new complaints, however hypokalemia has worsened but hyponatremia has improved. Patient is NPO for bronchoscopy today. Will replace potassium and then proceed with bronchoscopy. Blood glucose levels improved. Afebrile. Has productive cough but no chest pain.    2/1/18: Bronchoscopy with BAL, brushings, TBBx  RUL,JEANE was performed yesterday. Mucopurulent secretions from bot upper lobes was removed. Patient was kept in telemetry overnight. He is comfortable today morning. Denies chest  pain. Has mild cough. No hemoptysis. No wheeze. Afebrile. ID splt has changed vancomycin to Unasyn. Had hypokalemia for which potassium supplementation was done prior to bronchoscopy. Hyperglycemia uncontrolled.    2/2/18: Patient feels much better clinically. Denies chest pain , afebrile. No hemoptysis.     2/3/18: No new change in last 24 hours. Hypokalemia is resolved. Patient remains afebrile. Has productive cough. No AFB or fungal elements seen on BAL, or brushing and biopsy specimens. No malignant cells seen. On Unasyn for staphylococcal necrotizing pneumonia and empirically on voriconazole for possible fungal pneumonia with cavitations.      PFSH:  No change.    Respiratory:     Pulse Oximetry: 92 % on O2 supplementation        Exam: Not in respiratory distress  Accessory muscles of respiration are not prominent  Bilateral breath sounds audible with no wheeze or crepitations. No pleural rub.  Imaging: CT chest personally reviewed.  Bilateral upper lobe cavitary lesions thick walled with surrounding consolidation.  RML,RLL and LLL patchy infiltrates. No effusion    HemoDynamics:  Pulse: 96  Blood Pressure: 143/81       Exam: First and second heart sounds audible. No murmur or pericardial rub        Neuro:  GCS      Exam: Alert and orientated to time place and person.   Tendon jerks intact. No focal neurological deficit    Fluids:  Intake/Output       02/01/18 0700 - 02/02/18 0659 02/02/18 0700 - 02/03/18 0659 02/03/18 0700 - 02/04/18 0659      3806-3428 9222-2990 Total 0986-1055 3297-9405 Total 7605-5219 7179-7233 Total       Intake    P.O.  --  -- --  1300  360 1660  --  -- --    P.O. -- -- -- 9785 385 1794 -- -- --    I.V.  --  320 320  --  -- --  --  -- --    IV Piggyback Volume -- 200 200 -- -- -- -- -- --    IV Volume -- 120 120 -- -- -- -- -- --    Total Intake --  360 1660 -- -- --       Output    Urine  600  1180 1780  --  2110 2110  --  -- --    Void (ml) 600 1180 1780 -- 2110 2110 -- --  --    Stool  --  -- --  --  -- --  --  -- --    Number of Times Stooled -- 1 x 1 x -- -- -- -- -- --    Total Output 600 1180 1780 -- 2110 -- -- --       Net I/O     -600 -860 -1460 1300 -1750 -450 -- -- --        Weight: 91.9 kg (202 lb 9.6 oz)  Recent Labs      18   0305   SODIUM   --    --   136  135  134*   POTASSIUM   --    < >  3.0*  3.2*  3.8   CHLORIDE   --    --   105  103  102   CO2   --    --   21  25  23   BUN   --    --   12  7*  6*   CREATININE   --    --   0.52  0.53  0.63   MAGNESIUM  1.6   --   1.6  1.7   --    PHOSPHORUS   --    --   2.6  2.6  2.8   CALCIUM   --    --   7.4*  7.4*  7.7*    < > = values in this interval not displayed.       GI/Nutrition:  Exam: Oral mucosa is moist. No oral thrush   Abdomen is soft, non tender. Bowel sounds audible.    Liver Function  Recent Labs      18   0243  02/03/18   0305   GLUCOSE  221*  154*  131*       Heme:  Recent Labs      18   RBC   --   3.53*   HEMOGLOBIN   --   10.3*   HEMATOCRIT   --   31.2*   PLATELETCT   --   257   IRON  21*   --    FERRITIN  1585.5*   --    TOTIRONBC  130*   --        Infectious Disease:  Temp  Av.1 °C (98.7 °F)  Min: 36.5 °C (97.7 °F)  Max: 37.4 °C (99.3 °F)  Micro: Blood cultures no growth   Sputum Gram stain: Many WBCs and Gram positive cocci  Sputum culture: Staphylococcus sensitive to unasyn  BAL, Brushing and TBBx: No fungal elements, No AFB. No malignant cells seen  Fungal serology: Positive for coccidioides IgG. Neg IgM  Recent Labs      18   WBC  10.8   NEUTSPOLYS  83.20*   LYMPHOCYTES  8.90*   MONOCYTES  3.50   EOSINOPHILS  0.90   BASOPHILS  3.50*     Current Facility-Administered Medications   Medication Dose Frequency Provider Last Rate Last Dose   • insulin glargine (LANTUS) injection 14 Units  14 Units Q EVENING Tyson Bianchi M.D.   14 Units at 18   • potassium chloride SA  (Kdur) tablet 40 mEq  40 mEq BID Tyson Bianchi M.D.   40 mEq at 02/02/18 2022   • enoxaparin (LOVENOX) inj 40 mg  40 mg DAILY Muhammad K Gondal, M.D.   40 mg at 02/02/18 0837   • NS infusion   Continuous Muhammad K Gondal, M.D. 10 mL/hr at 02/02/18 0432     • ampicillin/sulbactam (UNASYN) 3 g in  mL IVPB  3 g Q6HRS Re Bermudez M.D.   Stopped at 02/03/18 0457   • mag hydrox-al hydrox-simeth (MAALOX PLUS ES or MYLANTA DS) suspension 10 mL  10 mL 4X/DAY PRN Lito Giraldo M.D.   10 mL at 01/29/18 1251   • ipratropium-albuterol (DUONEB) nebulizer solution 3 mL  3 mL Q4H PRN (RT) Lito Giraldo M.D.   3 mL at 02/02/18 1706   • senna-docusate (PERICOLACE or SENOKOT S) 8.6-50 MG per tablet 2 Tab  2 Tab BID Brandon Mota M.D.   2 Tab at 01/31/18 0811    And   • polyethylene glycol/lytes (MIRALAX) PACKET 1 Packet  1 Packet QDAY PRN Brandon Mota M.D.        And   • magnesium hydroxide (MILK OF MAGNESIA) suspension 30 mL  30 mL QDAY PRN Brandon Mota M.D.        And   • bisacodyl (DULCOLAX) suppository 10 mg  10 mg QDAY PRN Brandon Mota M.D.       • Respiratory Care per Protocol   Continuous RT Brandon Mota M.D.       • acetaminophen (TYLENOL) tablet 650 mg  650 mg Q6HRS PRN Brandon Mota M.D.       • Pharmacy Consult Request ...Pain Management Review   PRN Brandon Mota M.D.        And   • oxycodone immediate-release (ROXICODONE) tablet 2.5 mg  2.5 mg Q3HRS PRN Brandon Mota M.D.   Stopped at 02/01/18 1535    And   • oxycodone immediate-release (ROXICODONE) tablet 5 mg  5 mg Q3HRS PRN Brandon Mota M.D.   5 mg at 02/02/18 2203    And   • morphine (pf) 4 mg/ml injection 2 mg  2 mg Q3HRS PRN Brandon Mota M.D.       • insulin regular (HUMULIN R) injection 2-9 Units  2-9 Units 4X/DAY ACHDIMITRI Mota M.D.   Stopped at 02/03/18 0700    And   • glucose 4 g chewable tablet 16 g  16 g Q15 MIN PRLULU Mota M.D.        And   •  dextrose 50% (D50W) injection 25 mL  25 mL Q15 MIN PRN Brandon Mota M.D.       • ondansetron (ZOFRAN) syringe/vial injection 4 mg  4 mg Q4HRS PRN Brandon Mota M.D.       • ondansetron (ZOFRAN ODT) dispertab 4 mg  4 mg Q4HRS PRN Brandon Mota M.D.       • promethazine (PHENERGAN) tablet 12.5-25 mg  12.5-25 mg Q4HRS PRN Brandon Mota M.D.       • promethazine (PHENERGAN) suppository 12.5-25 mg  12.5-25 mg Q4HRS PRN Brandon Mota M.D.       • prochlorperazine (COMPAZINE) injection 5-10 mg  5-10 mg Q4HRS PRN Brandon Mota M.D.       • zolpidem (AMBIEN) tablet 5 mg  5 mg HS PRN - MR X 1 Brandon Mota M.D.   5 mg at 02/02/18 2201   • ibuprofen (MOTRIN) tablet 400 mg  400 mg Q6HRS PRN Brandon Mota M.D.       • guaiFENesin (ROBITUSSIN) 100 MG/5ML solution 200 mg  10 mL Q4HRS PRN Brandon Mota M.D.   200 mg at 02/03/18 0704   • voriconazole (VFEND) tablet 200 mg  200 mg Q12HRS Donald Ga M.D.   200 mg at 02/02/18 2022     Last reviewed on 1/31/2018  3:30 PM by Evonne Trent R.N.    Quality  Measures:  Labs reviewed, Medications reviewed and Radiology images reviewed  Mei catheter: No Mei      DVT Prophylaxis: Enoxaparin (Lovenox)            Problems:  Necrotizing pneumonia bilateral upper lobes. S/p bronchoscopy  Fungal cultures and histopathology pending  Hypokalemia on potasium supplementation  Diabetes Mellitus type 2 controlled      Plan:  Continue oxygen supplementation and titrate to maintain SpO2 above 94%.  Already on Voriconazole and Unasyn, ID following  Incentive spirometry, Flutter valve  Fungal cultures pending will follow  Repeat CXR  Continue bronchodilators.    Management paln was discussed with patient .

## 2018-02-03 NOTE — PROGRESS NOTES
[facility] Hospitalist Progress Note    Date of Service: 2/3/2018    Chief Complaint  57 y.o. Male who is a  in [place],  with type II diabetes mellitus, and hypertension, admitted 2018 with fevers, chills, nausea, vomiting and cough. Initially seen at [place] ED, where he was noted to have leukocytosis, and hyponatremia, with CT scan of the chest showing multiple cavitary lung lesions in the upper lobe. Started empirically on broad-spectrum antibiotics and antifungals. ID and pulmonary was consulted. Fungal serology sent. Sputum Gram stain showed gram-positive cocci which came back as MSSA. HIV was negative. HbA1c 12, started on long-term insulin. Her low potassium, and low normal magnesium, which were both replaced. S/p FOB and BAL, which showed bilateral purulent secretions in the right upper lobe and left upper lobe. BAL bacterial fungal cultures in progress. ID changed antibiotics to unasyn. Low potassium replaced. Procalcitonin trended down. Weaned off oxygen, and he was able to ambulate, with transient and mild desaturations.    Interval Problem Update  2/3/2018 - no overnight events. Remains hemodynamically stable and afebrile. Stable on RA.  Potassium normal, sodium 134. No report of desaturations with walking. ID recommending at least 2 weeks of IV antibiotics. BAL cytology did not show any malignancy, AFB organisms, or fungal elements. .    > Seen and examined. In good spirits. Still with cough productive of yellowish phlegm but improving. No SOB with exertion. No N/V, diarrhea, abd pain.         Consultants/Specialty  Pulmonology  ID    Disposition  Monitor on the floors. PT/OT eval for discharge planning.        ROS     Pertinent positives/negatives as mentioned above.     A complete review of systems was done. All other systems were negative.      Physical Exam  Laboratory/Imaging   Hemodynamics  Temp (24hrs), Av.1 °C (98.7 °F), Min:36.5 °C (97.7 °F), Max:37.4 °C (99.3 °F)    Temperature: 36.8 °C (98.3 °F)  Pulse  Av.9  Min: 80  Max: 119   Blood Pressure: 143/81      Respiratory      Respiration: 18, Pulse Oximetry: 93 %, O2 Daily Delivery Respiratory : Room Air with O2 Available     Given By:: Mouthpiece, PEP/CPT Method: Flutter Valve, Work Of Breathing / Effort: Mild  RUL Breath Sounds: Coarse Crackles, RML Breath Sounds: Diminished, RLL Breath Sounds: Diminished, JEANE Breath Sounds: Coarse Crackles, LLL Breath Sounds: Diminished    Fluids    Intake/Output Summary (Last 24 hours) at 18 0717  Last data filed at 18 0620   Gross per 24 hour   Intake             1660 ml   Output             2110 ml   Net             -450 ml       Nutrition  Orders Placed This Encounter   Procedures   • DIET ORDER     Standing Status:   Standing     Number of Occurrences:   1     Order Specific Question:   Diet:     Answer:   Diabetic [3]     Physical Exam   Constitutional: He is oriented to person, place, and time. He appears well-developed and well-nourished. No distress.   HENT:   Head: Normocephalic and atraumatic.   Mouth/Throat: Oropharynx is clear and moist. No oropharyngeal exudate.   Eyes: EOM are normal. Pupils are equal, round, and reactive to light. Right eye exhibits no discharge. Left eye exhibits no discharge. No scleral icterus.   Neck: Normal range of motion. Neck supple. No thyromegaly present.   Cardiovascular: Normal rate and regular rhythm.  Exam reveals no gallop and no friction rub.    No murmur heard.  Pulmonary/Chest: Effort normal and breath sounds normal. He has no wheezes. He has no rales. He exhibits no tenderness.   Abdominal: Soft. Bowel sounds are normal. There is no tenderness. There is no rebound and no guarding.   Musculoskeletal: Normal range of motion. He exhibits no edema or tenderness.   Lymphadenopathy:     He has no cervical adenopathy.   Neurological: He is alert and oriented to person, place, and time. No cranial nerve deficit. Coordination normal.    Skin: Skin is warm and dry. No rash noted. He is not diaphoretic. No erythema.   Psychiatric: He has a normal mood and affect. His behavior is normal. Thought content normal.   Vitals reviewed.      Recent Labs      02/01/18 0224   WBC  10.8   RBC  3.53*   HEMOGLOBIN  10.3*   HEMATOCRIT  31.2*   MCV  88.4   MCH  29.2   MCHC  33.0*   RDW  44.7   PLATELETCT  257   MPV  10.0     Recent Labs      02/01/18 0224 02/02/18   0243  02/03/18   0305   SODIUM  136  135  134*   POTASSIUM  3.0*  3.2*  3.8   CHLORIDE  105  103  102   CO2  21  25  23   GLUCOSE  221*  154*  131*   BUN  12  7*  6*   CREATININE  0.52  0.53  0.63   CALCIUM  7.4*  7.4*  7.7*                      Assessment/Plan     * Necrotizing pneumonia due to MSSA (CMS-HCC)- (present on admission)   Assessment & Plan    - Sputum culture growing staph aureus/MSSA, which could certainly cause cavitary/necrotizing pneumonia. Awaiting fungal cultures of BAL.    - Continue Unasyn for MSSA, and empiric voriconazole, will need 2 weeks course of IV followed by PO antibiotics. PICC line to be placed. Continue to follow fungal BAL cultures. ID on-board.   - Continue respiratory support per RT protocol. Off O2, continue PRN supplementation.         Acute respiratory failure with hypoxia due to necrotizing pneumonia (CMS-HCC)- (present on admission)   Assessment & Plan    - Continue antibiotics, and empiric antifungal for necrotizing pneumonia. Continue respiratory support with PRN bronchodilators, and continue oxygen supplement to keep saturations above 88%.        Cavitary lesion of lung- (present on admission)   Assessment & Plan    - Likely necrotizing pneumonia from staph aureus. Pending fungal culture of BAL, will follow. BAL cytology did not show any malignancy, AFB organisms, or fungal elements.  - Continue antibiotics and antifungals.        Hypocalcemia- (present on admission)   Assessment & Plan    - Corrected calcium is 10.6 (normal).         Hypokalemia-  (present on admission)   Assessment & Plan    - Continue 40 mEq oral K Dur BID. Repeat BMP in AM for continued monitoring.        Hyponatremia- (present on admission)   Assessment & Plan    - Likely due to SIADH from lung process. Continue to monitor sodium levels.        Uncontrolled type II diabetes mellitus (CMS-HCC)- (present on admission)   Assessment & Plan    - HbA1c 12.5. Much improved fasting glucose.  - Continue Lantus 14 units at HS, goal FBG< 150. Continue to hold metformin and Victoza for now, resume on discharge.        Hypoalbuminemia- (present on admission)   Assessment & Plan    - Continue to monitor.        Normocytic anemia- (present on admission)   Assessment & Plan    - Likely anemia of chronic disease, with markedly elevated ferritin.   - Continue to monitor, transfuse for hemoglobin <7.            Reviewed items::  Labs reviewed, Medications reviewed and Radiology images reviewed  Mei catheter::  No Mei  DVT prophylaxis pharmacological::  Enoxaparin (Lovenox)  Ulcer Prophylaxis::  Not indicated  Antibiotics:  Treating active infection/contamination beyond 24 hours perioperative coverage

## 2018-02-03 NOTE — PROGRESS NOTES
Bedside report received from Ana NIELSEN. Patient's chart and MAR reviewed. 12 hour chart check complete. Patient is awake in bed. Patient is AOx4. Patient was updated on plan of care for the shift. Patient denies any additional needs at this time. White board updated. Call light and personal belongings within reach, bed in lowest position.

## 2018-02-04 LAB
ALBUMIN SERPL BCP-MCNC: 1.7 G/DL (ref 3.2–4.9)
ANION GAP SERPL CALC-SCNC: 7 MMOL/L (ref 0–11.9)
BUN SERPL-MCNC: 5 MG/DL (ref 8–22)
CALCIUM SERPL-MCNC: 7.3 MG/DL (ref 8.5–10.5)
CHLORIDE SERPL-SCNC: 104 MMOL/L (ref 96–112)
CO2 SERPL-SCNC: 23 MMOL/L (ref 20–33)
CREAT SERPL-MCNC: 0.45 MG/DL (ref 0.5–1.4)
GLUCOSE BLD-MCNC: 142 MG/DL (ref 65–99)
GLUCOSE BLD-MCNC: 158 MG/DL (ref 65–99)
GLUCOSE BLD-MCNC: 183 MG/DL (ref 65–99)
GLUCOSE BLD-MCNC: 229 MG/DL (ref 65–99)
GLUCOSE SERPL-MCNC: 137 MG/DL (ref 65–99)
PHOSPHATE SERPL-MCNC: 2.8 MG/DL (ref 2.5–4.5)
POTASSIUM SERPL-SCNC: 4 MMOL/L (ref 3.6–5.5)
SODIUM SERPL-SCNC: 134 MMOL/L (ref 135–145)

## 2018-02-04 PROCEDURE — 700111 HCHG RX REV CODE 636 W/ 250 OVERRIDE (IP): Performed by: INTERNAL MEDICINE

## 2018-02-04 PROCEDURE — 99232 SBSQ HOSP IP/OBS MODERATE 35: CPT | Performed by: INTERNAL MEDICINE

## 2018-02-04 PROCEDURE — 36415 COLL VENOUS BLD VENIPUNCTURE: CPT

## 2018-02-04 PROCEDURE — 700102 HCHG RX REV CODE 250 W/ 637 OVERRIDE(OP): Performed by: HOSPITALIST

## 2018-02-04 PROCEDURE — A9270 NON-COVERED ITEM OR SERVICE: HCPCS | Performed by: INTERNAL MEDICINE

## 2018-02-04 PROCEDURE — A9270 NON-COVERED ITEM OR SERVICE: HCPCS | Performed by: HOSPITALIST

## 2018-02-04 PROCEDURE — 94668 MNPJ CHEST WALL SBSQ: CPT

## 2018-02-04 PROCEDURE — 80069 RENAL FUNCTION PANEL: CPT

## 2018-02-04 PROCEDURE — 700105 HCHG RX REV CODE 258: Performed by: INTERNAL MEDICINE

## 2018-02-04 PROCEDURE — 82962 GLUCOSE BLOOD TEST: CPT | Mod: 91

## 2018-02-04 PROCEDURE — 94760 N-INVAS EAR/PLS OXIMETRY 1: CPT

## 2018-02-04 PROCEDURE — 94669 MECHANICAL CHEST WALL OSCILL: CPT

## 2018-02-04 PROCEDURE — 770020 HCHG ROOM/CARE - TELE (206)

## 2018-02-04 PROCEDURE — 700102 HCHG RX REV CODE 250 W/ 637 OVERRIDE(OP): Performed by: INTERNAL MEDICINE

## 2018-02-04 RX ORDER — GUAIFENESIN 600 MG/1
600 TABLET, EXTENDED RELEASE ORAL EVERY 12 HOURS
Status: DISCONTINUED | OUTPATIENT
Start: 2018-02-04 | End: 2018-02-09 | Stop reason: HOSPADM

## 2018-02-04 RX ADMIN — GUAIFENESIN 600 MG: 600 TABLET, EXTENDED RELEASE ORAL at 21:29

## 2018-02-04 RX ADMIN — VORICONAZOLE 200 MG: 200 TABLET, FILM COATED ORAL at 07:29

## 2018-02-04 RX ADMIN — POTASSIUM CHLORIDE 40 MEQ: 1500 TABLET, EXTENDED RELEASE ORAL at 07:30

## 2018-02-04 RX ADMIN — INSULIN HUMAN 2 UNITS: 100 INJECTION, SOLUTION PARENTERAL at 12:20

## 2018-02-04 RX ADMIN — VORICONAZOLE 200 MG: 200 TABLET, FILM COATED ORAL at 21:33

## 2018-02-04 RX ADMIN — INSULIN GLARGINE 14 UNITS: 100 INJECTION, SOLUTION SUBCUTANEOUS at 21:30

## 2018-02-04 RX ADMIN — AMPICILLIN SODIUM AND SULBACTAM SODIUM 3 G: 2; 1 INJECTION, POWDER, FOR SOLUTION INTRAMUSCULAR; INTRAVENOUS at 21:29

## 2018-02-04 RX ADMIN — ENOXAPARIN SODIUM 40 MG: 100 INJECTION SUBCUTANEOUS at 07:29

## 2018-02-04 RX ADMIN — AMPICILLIN SODIUM AND SULBACTAM SODIUM 3 G: 2; 1 INJECTION, POWDER, FOR SOLUTION INTRAMUSCULAR; INTRAVENOUS at 09:20

## 2018-02-04 RX ADMIN — OXYCODONE HYDROCHLORIDE 5 MG: 5 TABLET ORAL at 17:19

## 2018-02-04 RX ADMIN — OXYCODONE HYDROCHLORIDE 5 MG: 5 TABLET ORAL at 21:33

## 2018-02-04 RX ADMIN — POTASSIUM CHLORIDE 40 MEQ: 1500 TABLET, EXTENDED RELEASE ORAL at 21:29

## 2018-02-04 RX ADMIN — AMPICILLIN SODIUM AND SULBACTAM SODIUM 3 G: 2; 1 INJECTION, POWDER, FOR SOLUTION INTRAMUSCULAR; INTRAVENOUS at 15:53

## 2018-02-04 RX ADMIN — GUAIFENESIN 600 MG: 600 TABLET, EXTENDED RELEASE ORAL at 09:20

## 2018-02-04 RX ADMIN — INSULIN HUMAN 3 UNITS: 100 INJECTION, SOLUTION PARENTERAL at 21:30

## 2018-02-04 RX ADMIN — ZOLPIDEM TARTRATE 5 MG: 5 TABLET, FILM COATED ORAL at 23:22

## 2018-02-04 RX ADMIN — OXYCODONE HYDROCHLORIDE 5 MG: 5 TABLET ORAL at 07:30

## 2018-02-04 RX ADMIN — GUAIFENESIN 200 MG: 100 SOLUTION ORAL at 18:45

## 2018-02-04 RX ADMIN — GUAIFENESIN 200 MG: 100 SOLUTION ORAL at 07:29

## 2018-02-04 RX ADMIN — INSULIN HUMAN 2 UNITS: 100 INJECTION, SOLUTION PARENTERAL at 17:14

## 2018-02-04 RX ADMIN — AMPICILLIN SODIUM AND SULBACTAM SODIUM 3 G: 2; 1 INJECTION, POWDER, FOR SOLUTION INTRAMUSCULAR; INTRAVENOUS at 04:13

## 2018-02-04 ASSESSMENT — PAIN SCALES - GENERAL
PAINLEVEL_OUTOF10: 4
PAINLEVEL_OUTOF10: 5
PAINLEVEL_OUTOF10: 0
PAINLEVEL_OUTOF10: 2
PAINLEVEL_OUTOF10: 1
PAINLEVEL_OUTOF10: 5
PAINLEVEL_OUTOF10: 3
PAINLEVEL_OUTOF10: 0
PAINLEVEL_OUTOF10: 2

## 2018-02-04 ASSESSMENT — ENCOUNTER SYMPTOMS
ABDOMINAL PAIN: 0
DIARRHEA: 0
HEMOPTYSIS: 0
CHILLS: 0
WHEEZING: 0
NAUSEA: 0
FEVER: 0
COUGH: 1
SHORTNESS OF BREATH: 0
VOMITING: 0
SPUTUM PRODUCTION: 1

## 2018-02-04 NOTE — PROGRESS NOTES
Patient resting comfortably, patient still has some coughing episodes. Robitussin administered as needed. See Mar for pain management. No further needs voiced. Continuing to monitor.

## 2018-02-04 NOTE — CARE PLAN
Problem: Safety  Goal: Will remain free from injury  Outcome: PROGRESSING AS EXPECTED  Educated patient on use of call light, no slip socks on, bed lowest position. All needs attended to. Patient verbalized understanding.     Problem: Pain Management  Goal: Pain level will decrease to patient's comfort goal  Pain is assessed throughout the shift and per unit protocol. Pharmacological and non-pharmacological measures to treat pain are offered to patient. Pt medicated per MAR. Patient verbalized understanding of pain control measures.

## 2018-02-04 NOTE — PROGRESS NOTES
Infectious Disease Progress Note    Author: Dee Hampton M.D. Date & Time of service: 2018  10:34 AM    Chief Complaint:  Cavitary lung lesions      Interval History:  57-year-old diabetic  admitted due to worsening cough, found to have CT with bilateral upper lobe   cavitary lesions   Temp 100.8, sputum +SA Feels much better today   AF WBC 10.9 feels better-awaiting bronch   AF, WBC 10.8, denies any SOB, had bronch yesterday, no abd pain or diarrhea   AF, no CBC, upset he did not get a breathing treatment last night, had cough fit overnight, on  LNC  /3 AF, no CBC, on room air, breathing better with decreased sputum production   AF, no CBC, moved to a private room, continue to saturate well on RA, no new symptoms  Labs Reviewed, Medications Reviewed and Radiology Reviewed.    Review of Systems:  Review of Systems   Constitutional: Negative for chills and fever.   Respiratory: Positive for cough and sputum production. Negative for hemoptysis, shortness of breath and wheezing.         Decreased   Cardiovascular: Negative for chest pain.   Gastrointestinal: Negative for abdominal pain, diarrhea, nausea and vomiting.   All other systems reviewed and are negative.      Hemodynamics:  Temp (24hrs), Av.9 °C (98.4 °F), Min:36.7 °C (98.1 °F), Max:37.2 °C (98.9 °F)  Temperature: 37.2 °C (98.9 °F)  Pulse  Av.8  Min: 77  Max: 119  Blood Pressure: 123/73       Physical Exam:  Physical Exam   Constitutional: He is oriented to person, place, and time. He appears well-developed. No distress.   HENT:   Head: Normocephalic and atraumatic.   Eyes: EOM are normal. Pupils are equal, round, and reactive to light.   Neck: Neck supple.   Cardiovascular: Normal rate.    No murmur heard.  Pulmonary/Chest: Effort normal. No respiratory distress. He has no wheezes. He has rales.   Abdominal: Soft. He exhibits no distension. There is no tenderness. There is no rebound.   Musculoskeletal: He  exhibits no edema.   Neurological: He is alert and oriented to person, place, and time.   Skin: No rash noted.   Nursing note and vitals reviewed.      Meds:    Current Facility-Administered Medications:   •  guaiFENesin LA  •  insulin glargine  •  potassium chloride SA  •  enoxaparin (LOVENOX) injection  •  NS  •  ampicillin-sulbactam (UNASYN) IV  •  mag hydrox-al hydrox-simeth  •  ipratropium-albuterol  •  senna-docusate **AND** polyethylene glycol/lytes **AND** magnesium hydroxide **AND** bisacodyl  •  Respiratory Care per Protocol  •  acetaminophen  •  Notify provider if pain remains uncontrolled **AND** Use the numeric rating scale (NRS-11) on regular floors and Critical-Care Pain Observation Tool (CPOT) on ICUs/Trauma to assess pain **AND** Pulse Ox (Oximetry) **AND** Pharmacy Consult Request **AND** If patient difficult to arouse and/or has respiratory depression, stop any opiates that are currently infusing and call a Rapid Response. **AND** oxyCODONE immediate-release **AND** oxyCODONE immediate-release **AND** morphine injection  •  insulin regular **AND** Accu-Chek ACHS **AND** NOTIFY MD and PharmD **AND** glucose 4 g **AND** dextrose 50%  •  ondansetron  •  ondansetron  •  promethazine  •  promethazine  •  prochlorperazine  •  zolpidem  •  ibuprofen  •  guaiFENesin  •  voriconazole    Labs:  No results for input(s): WBC, RBC, HEMOGLOBIN, HEMATOCRIT, MCV, MCH, RDW, PLATELETCT, MPV, NEUTSPOLYS, LYMPHOCYTES, MONOCYTES, EOSINOPHILS, BASOPHILS, RBCMORPHOLO in the last 72 hours.  Recent Labs      02/02/18   0243  02/03/18   0305  02/04/18   0209   SODIUM  135  134*  134*   POTASSIUM  3.2*  3.8  4.0   CHLORIDE  103  102  104   CO2  25  23  23   GLUCOSE  154*  131*  137*   BUN  7*  6*  5*     Recent Labs      02/02/18   0243  02/03/18   0305  02/04/18   0209   ALBUMIN  1.9*  2.2*  1.7*   CREATININE  0.53  0.63  0.45*       Imaging:  No results found.    Micro:  Results     Procedure Component Value Units  Date/Time    AFB CULTURE [[id number]] Collected:  01/31/18 1630    Order Status:  Completed Specimen:  Respirate from Bronchoalveolar Lavage Updated:  02/03/18 1217     Significant Indicator NEG     Source RESP     Site Bronchial Washings     AFB Culture Culture in progress.     AFB Smear Results No acid fast bacilli seen.    Narrative:       Collected By:[id number] GONDAL MUHAMMAD K  JEANE    CULTURE RESPIRATORY W/ GRM STN [[id number]]  (Abnormal) Collected:  01/31/18 1630    Order Status:  Completed Specimen:  Respirate from Bronchoalveolar Lavage Updated:  02/03/18 1217     Gram Stain Result --     Few WBCs.  Few Gram positive cocci.       Significant Indicator POS (POS)     Source RESP     Site Bronchial Washings     Respiratory Culture -- (A)     Growth noted after further incubation,see below for  organism identification.       Respiratory Culture -- (A)     Staphylococcus aureus  Light growth  See previous culture for sensitivity report.      Narrative:       Collected By:[id number] GONDAL MUHAMMAD K  JEANE    FUNGAL CULTURE [[id number]] Collected:  01/31/18 1630    Order Status:  Completed Specimen:  Respirate from Bronchoalveolar Lavage Updated:  02/03/18 1217     Significant Indicator NEG     Source RESP     Site Bronchial Washings     Fungal Culture Culture in progress.    Narrative:       Collected By:[id number] GONDAL MUHAMMAD K  JEANE    AFB [[id number]] Collected:  01/31/18 1630    Order Status:  Completed Specimen:  Respirate from Bronchoalveolar Lavage Updated:  02/03/18 1216     Significant Indicator NEG     Source RESP     Site Bronchoalveolar Lavage RUL     AFB Culture Culture in progress.     AFB Smear Results No acid fast bacilli seen.    Narrative:       Collected By:[id number] GONDAL MUHAMMAD K  JEANE    FUNGAL CULTURE [[id number]] Collected:  01/31/18 1630    Order Status:  Completed Specimen:  Respirate from Bronchoalveolar Lavage Updated:  02/03/18 1216     Significant Indicator NEG     Source RESP     Site  Bronchoalveolar Lavage RUL     Fungal Culture Culture in progress.    Narrative:       Collected By:[id number] GONDAL MUHAMMAD K  JEANE    QUANT BRONCHIAL WASHING [[id number]]  (Abnormal) Collected:  01/31/18 1630    Order Status:  Completed Specimen:  Respirate Updated:  02/03/18 1216     Gram Stain Result --     Rare WBCs.  Rare Gram positive cocci.  <5% intracellular organisms.       Significant Indicator POS (POS)     Source RESP     Site Bronchoalveolar Lavage RUL     Quantitative Bronch Washing -- (A)     Growth noted after further incubation,see below for  organism identification.       Quantitative Bronch Washing -- (A)     Staphylococcus aureus  10,000 cfu/mL  See previous culture for sensitivity report.      Narrative:       Collected By:[id number] GONDAL MUHAMMAD K  JEANE    FUNGAL CULTURE [[id number]] Collected:  01/31/18 1630    Order Status:  Completed Specimen:  Respirate from Bronchoalveolar Lavage Updated:  02/03/18 1215     Significant Indicator NEG     Source RESP     Site Bronchoalveolar Lavage JEANE     Fungal Culture Culture in progress.    Narrative:       Collected By:[id number] GONDAL MUHAMMACRISTÓBAL TRIVEDI  RUL  Collected By:[id number] GONDAL MUHAMMAD K  JEANE    AFB [[id number]] Collected:  01/31/18 1630    Order Status:  Completed Specimen:  Respirate from Bronchoalveolar Lavage Updated:  02/03/18 1215     Significant Indicator NEG     Source RESP     Site Bronchoalveolar Lavage JEANE     AFB Culture Culture in progress.     AFB Smear Results No acid fast bacilli seen.    Narrative:       Collected By:[id number] GONDAL MUHAMMAD K  RUL  Collected By:[id number] GONDAL MUHAMMAD K  JEANE    QUANT BRONCHIAL WASHING [[id number]]  (Abnormal)  (Susceptibility) Collected:  01/31/18 1630    Order Status:  Completed Specimen:  Respirate Updated:  02/03/18 1215     Gram Stain Result --     Many WBCs.  Moderate Gram positive cocci.  <5% intracellular organisms.       Significant Indicator POS (POS)     Source RESP     Site Bronchoalveolar Lavage JEANE     " Quantitative Bronch Washing -- (A)     Growth noted after further incubation,see below for  organism identification.       Quantitative Bronch Washing -- (A)     Staphylococcus aureus  40,000 cfu/mL      Narrative:       Collected By:[id number] GONDAL MUHAMMAD K RUL  Collected By:[id number] GONDAL MUHAMMAD K  JEANE    Culture & Susceptibility     STAPHYLOCOCCUS AUREUS     Antibiotic Sensitivity Microscan Unit Status    Ampicillin/sulbactam Sensitive <=8/4 mcg/mL Final    Clindamycin Sensitive <=0.5 mcg/mL Final    Erythromycin Sensitive <=0.5 mcg/mL Final    Moxifloxacin Sensitive <=0.5 mcg/mL Final    Oxacillin Sensitive <=0.25 mcg/mL Final    Tetracycline Sensitive <=4 mcg/mL Final    Trimeth/Sulfa Sensitive <=0.5/9.5 mcg/mL Final    Vancomycin Sensitive 2 mcg/mL Final                       BLOOD CULTURE [[id number]] Collected:  01/28/18 1908    Order Status:  Completed Specimen:  Blood from Peripheral Updated:  02/02/18 2100     Significant Indicator NEG     Source BLD     Site PERIPHERAL     Blood Culture No growth after 5 days of incubation.    Narrative:       Per Hospital Policy: Only change Specimen Src: to \"Line\" if  specified by physician order.    BLOOD CULTURE [[id number]] Collected:  01/28/18 1908    Order Status:  Completed Specimen:  Blood from Peripheral Updated:  02/02/18 2100     Significant Indicator NEG     Source BLD     Site PERIPHERAL     Blood Culture No growth after 5 days of incubation.    Narrative:       Per Hospital Policy: Only change Specimen Src: to \"Line\" if  specified by physician order.    AFB CULTURE [[id number]] Collected:  01/31/18 1630    Order Status:  Completed Specimen:  Respirate from Bronchoalveolar Lavage Updated:  02/02/18 1054     Significant Indicator NEG     Source RESP     Site Bronchial Brushings JEANE     AFB Culture Culture in progress.     AFB Smear Results No acid fast bacilli seen.    Narrative:       Collected By:[id number] GONDAL MUHAMMAD K RUL    RESP CULTURE [[id number]] " Collected:  01/31/18 1630    Order Status:  Completed Specimen:  Respirate from Bronchoalveolar Lavage Updated:  02/02/18 1054     Gram Stain Result No organisms seen.     Significant Indicator NEG     Source RESP     Site Bronchial Brushings JEANE     Respiratory Culture No growth at 48 hours.    Narrative:       Collected By:[id number] GONDAL MUHAMMAD K  RUL    FUNGAL CULTURE [[id number]] Collected:  01/31/18 1630    Order Status:  Completed Specimen:  Respirate from Bronchoalveolar Lavage Updated:  02/02/18 1054     Significant Indicator NEG     Source RESP     Site Bronchial Brushings JEANE     Fungal Culture Culture in progress.    Narrative:       Collected By:[id number] GONDAL MUHAMMAD K  RUL    GRAM STAIN [[id number]] Collected:  01/31/18 1630    Order Status:  Completed Specimen:  Respirate Updated:  02/01/18 1729     Significant Indicator .     Source RESP     Site Bronchial Brushings JEANE     Gram Stain Result No organisms seen.    Narrative:       Collected By:[id number] GONDAL MUHAMMAD K  RUL    ACID FAST STAIN [[id number]] Collected:  01/31/18 1630    Order Status:  Completed Specimen:  Respirate Updated:  02/01/18 1729     Significant Indicator NEG     Source RESP     Site Bronchial Brushings JEANE     AFB Smear Results No acid fast bacilli seen.    Narrative:       Collected By:[id number] GONDAL MUHAMMAD K  RUL    GRAM STAIN [[id number]] Collected:  01/31/18 1630    Order Status:  Completed Specimen:  Respirate Updated:  02/01/18 1729     Significant Indicator .     Source RESP     Site Bronchial Washings     Gram Stain Result --     Few WBCs.  Few Gram positive cocci.      Narrative:       Collected By:[id number] GONDAL MUHAMMAD K  JEANE    ACID FAST STAIN [[id number]] Collected:  01/31/18 1630    Order Status:  Completed Specimen:  Respirate Updated:  02/01/18 1729     Significant Indicator NEG     Source RESP     Site Bronchial Washings     AFB Smear Results No acid fast bacilli seen.    Narrative:       Collected By:[id number] GONDAL  ESPARZA K  JEANE    GRAM STAIN [[id number]] Collected:  01/31/18 1630    Order Status:  Completed Specimen:  Respirate Updated:  02/01/18 1729     Significant Indicator .     Source RESP     Site Bronchoalveolar Lavage RUL     Gram Stain Result --     Rare WBCs.  Rare Gram positive cocci.  <5% intracellular organisms.      Narrative:       Collected By:[id number] GONDAL ESPARZA K  JEANE    ACID FAST STAIN [[id number]] Collected:  01/31/18 1630    Order Status:  Completed Specimen:  Respirate Updated:  02/01/18 1729     Significant Indicator NEG     Source RESP     Site Bronchoalveolar Lavage RUL     AFB Smear Results No acid fast bacilli seen.    Narrative:       Collected By:[id number] GONDAL ESPARZA K  JEANE    GRAM STAIN [[id number]] Collected:  01/31/18 1630    Order Status:  Completed Specimen:  Respirate Updated:  02/01/18 1729     Significant Indicator .     Source RESP     Site Bronchoalveolar Lavage JEANE     Gram Stain Result --     Many WBCs.  Moderate Gram positive cocci.  <5% intracellular organisms.      Narrative:       Collected By:[id number] GONDAL ESPARZA K  RUL  Collected By:[id number] GONDAL ESPARZA K  JEANE    ACID FAST STAIN [[id number]] Collected:  01/31/18 1630    Order Status:  Completed Specimen:  Respirate Updated:  02/01/18 1729     Significant Indicator NEG     Source RESP     Site Bronchoalveolar Lavage JEANE     AFB Smear Results No acid fast bacilli seen.    Narrative:       Collected By:[id number] GONDAL ESPARZA K  RUL  Collected By:[id number] GONDAL ESPARZA K  JEANE    CULTURE RESPIRATORY W/ GRM STN [[id number]] Collected:  01/31/18 1630    Order Status:  Canceled Specimen:  Other from Bronchoalveolar Lavage     CULTURE RESPIRATORY W/ GRM STN [[id number]] Collected:  01/31/18 1630    Order Status:  Canceled Specimen:  Other from Bronchoalveolar Lavage     Culture Respiratory W/ GRM STN [[id number]]  (Abnormal)  (Susceptibility) Collected:  01/29/18 0230    Order Status:  Completed Specimen:  Respirate from Sputum  Updated:  01/31/18 0746     Gram Stain Result --     Many WBCs.  Few epithelial cells.  Many Gram positive cocci.  Specimen Quality Score: 2+       Significant Indicator POS (POS)     Source RESP     Site SPUTUM     Respiratory Culture -- (A)     Respiratory Culture -- (A)     Staphylococcus aureus  Heavy growth      Narrative:       Collected By:ANTONIA MILLER  Preferably before first antibiotic dose - add Gram stain if  indicated    Culture & Susceptibility     STAPHYLOCOCCUS AUREUS     Antibiotic Sensitivity Microscan Unit Status    Ampicillin/sulbactam Sensitive <=8/4 mcg/mL Final    Clindamycin Sensitive <=0.5 mcg/mL Final    Daptomycin Sensitive 1 mcg/mL Final    Erythromycin Sensitive <=0.5 mcg/mL Final    Moxifloxacin Sensitive <=0.5 mcg/mL Final    Oxacillin Sensitive <=0.25 mcg/mL Final    Tetracycline Sensitive <=4 mcg/mL Final    Trimeth/Sulfa Sensitive <=0.5/9.5 mcg/mL Final    Vancomycin Sensitive 2 mcg/mL Final                       MRSA BY PCR (AMP) [[id number]]     Order Status:  No result Specimen:  Respirate from Nares     GRAM STAIN [[id number]] Collected:  01/29/18 0230    Order Status:  Completed Specimen:  Respirate Updated:  01/29/18 1310     Significant Indicator .     Source RESP     Site SPUTUM     Gram Stain Result --     Many WBCs.  Few epithelial cells.  Many Gram positive cocci.  Specimen Quality Score: 2+      Narrative:       Collected By:ANTONIA MILLER  Preferably before first antibiotic dose - add Gram stain if  indicated          Assessment:  Active Hospital Problems    Diagnosis   • *Pneumonia [J18.9]   • Cavitary lesion of lung [J98.4]   • Hyponatremia [E87.1]   • Hypokalemia [E87.6]   • Hypocalcemia [E83.51]   • Hypoalbuminemia [E88.09]   • Normocytic anemia [D64.9]   • Type II diabetes mellitus (CMS-HCC) [E11.9]       Plan:  Cavitary lung lesions  Low grade fever resolved  No leukocytosis  Blood cxs neg  HIV neg  Sputum +MSSA  S/p Bronch with BAL on 1/31  BAL  gram stain +GPC, Cx - MSSA  AFB stains neg  May need JOI  Continue Unasyn and vori  Cocci serology +IgM, -IgM-continue vori  Anticipate at least 2 weeks of IV abx followed by PO abx  PICC ordered  Can do ertapenem 1 g daily    Diabetes, poorly controlled  Keep BS under 150 to help control current infection  Hgb A1C 12.5    If pt goes to SNF, pt can remain on unasyn. However, he lives in [place] and prefers to go to [facility]. His PCP will need to take over abx orders for ertapenem if pt goes home.    Discussed with internal medicine/Dr. Bianchi

## 2018-02-04 NOTE — CARE PLAN
Problem: Pain Management  Goal: Pain level will decrease to patient's comfort goal    Intervention: Follow pain managment plan developed in collaboration with patient and Interdisciplinary Team  Educated patient on importance of pain management plan and to use call light if in pain to communicate with the nurse.

## 2018-02-04 NOTE — CARE PLAN
Problem: Safety  Goal: Will remain free from injury    Intervention: Provide assistance with mobility  Instructed patient to call out if in need of assistance. Call light within reach.

## 2018-02-04 NOTE — PROGRESS NOTES
Report received at bedside from RN. Patient A & O x 4. Patient resting in bed. No acute distress. Patient complains of pain 5/10 in back. Medicated per MAR. No behavioral pain indicators noted.  No SOB/dyspnea noted. Cough noted. Patient denies chest pain/palpitations.  Patient denies nausea. Abdomen: nondistended, nontender. + bowel sounds in all quadrants. Patient passing flatus.  No S/S hypoglycemia/hyperglycemia noted.  Skin: Intact Activity: Up self   Fall and safety precautions maintained.Hourly rounding in progress.

## 2018-02-04 NOTE — PROGRESS NOTES
Patient Assessment complete, patient having coughing episode due to roommates fragrance, communicated with charge nurse and transferred patient to another room. Patient has no complaints of pain and no other needs are voiced at this time. Will continue to monitor.

## 2018-02-04 NOTE — PROGRESS NOTES
[facility]ist Progress Note    Date of Service: 2/4/2018    Chief Complaint  57 y.o. Male who is a  in [place],  with type II diabetes mellitus, and hypertension, admitted 1/28/2018 with fevers, chills, nausea, vomiting and cough. Initially seen at [place] ED, where he was noted to have leukocytosis, and hyponatremia, with CT scan of the chest showing multiple cavitary lung lesions in the upper lobe. Started empirically on broad-spectrum antibiotics and antifungals. ID and pulmonary was consulted. Fungal serology sent. Sputum Gram stain showed gram-positive cocci which came back as MSSA. HIV was negative. HbA1c 12, started on long-term insulin. Her low potassium, and low normal magnesium, which were both replaced. S/p FOB and BAL, which showed bilateral purulent secretions in the right upper lobe and left upper lobe. BAL bacterial fungal cultures in progress. ID changed antibiotics to unasyn. Low potassium replaced. Procalcitonin trended down. Weaned off oxygen, and he was able to ambulate, with transient and mild desaturations. ID recommending at least 2 weeks of IV antibiotics. BAL cytology did not show any malignancy, AFB organisms, or fungal elements.    Interval Problem Update  2/4/2018 - uneventful night. VSS. Afebrile. Saturating well on RA.  Sodium 134, albumin 1.7, potassium and creatinine normal. Coccidioides IgG positive, negative IgM. Aspergillus antibodies negative. BAL wash cultures grew light growth of staph aureus. Awaiting PICC placement.     > Seen and examined. Still with cough productive of scant yellow phlegm. No CP. Breathing well. No nausea, vomiting, diarrhea, abd pain, fevers, chills.       Consultants/Specialty  Pulmonology  ID    Disposition  Monitor on the floors. Awaiting PT/OT eval for discharge planning, likely to home        ROS     Pertinent positives/negatives as mentioned above.     A complete review of systems was done. All other systems were negative.         Physical Exam  Laboratory/Imaging   Hemodynamics  Temp (24hrs), Av.8 °C (98.3 °F), Min:36.7 °C (98.1 °F), Max:37.1 °C (98.8 °F)   Temperature: 36.8 °C (98.2 °F)  Pulse  Av  Min: 77  Max: 119   Blood Pressure: 133/61      Respiratory      Respiration: 18, Pulse Oximetry: 95 %, O2 Daily Delivery Respiratory : Room Air with O2 Available     PEP/CPT Method: Flutter Valve, Work Of Breathing / Effort: Mild  RUL Breath Sounds: Clear, RML Breath Sounds: Clear, RLL Breath Sounds: Diminished, JEANE Breath Sounds: Clear, LLL Breath Sounds: Diminished    Fluids    Intake/Output Summary (Last 24 hours) at 18 0722  Last data filed at 18 0400   Gross per 24 hour   Intake             1520 ml   Output             1900 ml   Net             -380 ml       Nutrition  Orders Placed This Encounter   Procedures   • DIET ORDER     Standing Status:   Standing     Number of Occurrences:   1     Order Specific Question:   Diet:     Answer:   Diabetic [3]     Physical Exam   Constitutional: He is oriented to person, place, and time. He appears well-developed and well-nourished. No distress.   HENT:   Head: Normocephalic and atraumatic.   Mouth/Throat: Oropharynx is clear and moist. No oropharyngeal exudate.   Eyes: EOM are normal. Pupils are equal, round, and reactive to light. Right eye exhibits no discharge. Left eye exhibits no discharge. No scleral icterus.   Neck: Normal range of motion. Neck supple. No thyromegaly present.   Cardiovascular: Normal rate and regular rhythm.  Exam reveals no gallop and no friction rub.    No murmur heard.  Pulmonary/Chest: Effort normal and breath sounds normal. He has no wheezes. He has no rales. He exhibits no tenderness.   Abdominal: Soft. Bowel sounds are normal. There is no tenderness. There is no rebound and no guarding.   Musculoskeletal: Normal range of motion. He exhibits no edema or tenderness.   Lymphadenopathy:     He has no cervical adenopathy.   Neurological: He is alert  and oriented to person, place, and time. No cranial nerve deficit. Coordination normal.   Skin: Skin is warm and dry. No rash noted. He is not diaphoretic. No erythema.   Psychiatric: He has a normal mood and affect. His behavior is normal. Thought content normal.   Vitals reviewed.        Recent Labs      02/02/18   0243  02/03/18   0305  02/04/18   0209   SODIUM  135  134*  134*   POTASSIUM  3.2*  3.8  4.0   CHLORIDE  103  102  104   CO2  25  23  23   GLUCOSE  154*  131*  137*   BUN  7*  6*  5*   CREATININE  0.53  0.63  0.45*   CALCIUM  7.4*  7.7*  7.3*                      Assessment/Plan     * Necrotizing pneumonia due to MSSA (CMS-HCC)- (present on admission)   Assessment & Plan    - Sputum culture growing staph aureus/MSSA, which could certainly cause cavitary/necrotizing pneumonia. Cocci IgG positive. .    - Continue Unasyn for MSSA, and empiric voriconazole. Will need 2 weeks course of IV followed by PO antibiotics. Await PICC placement. Anticipate OPIC, pt doing well mobility-wise.   - Continue to follow fungal BAL cultures. ID on-board.   - Continue respiratory support per RT protocol. Continue PRN  O2.        Acute respiratory failure with hypoxia due to necrotizing pneumonia (CMS-HCC)- (present on admission)   Assessment & Plan    - Resolved.   - Continue antibiotics/antifungal for necrotizing pneumonia. Continue respiratory support with PRN bronchodilators, and PRN oxygen supplement to keep saturations above 88%. Now on room air.        Cavitary lesion of lung- (present on admission)   Assessment & Plan    - Likely necrotizing pneumonia from staph aureus. Pending fungal culture of BAL, continue to follow. BAL cytology did not show any malignancy, AFB organisms, or fungal elements.  - Continue antibiotics and antifungals.        Hypocalcemia- (present on admission)   Assessment & Plan    - Corrected calcium is 10.6 (normal).         Hypokalemia- (present on admission)   Assessment & Plan    - Continue 40  mEq oral K Dur BID.         Hyponatremia- (present on admission)   Assessment & Plan    - Likely due to SIADH from lung process. Continue to monitor, BMP in AM.        Uncontrolled type II diabetes mellitus (CMS-HCC)- (present on admission)   Assessment & Plan    - HbA1c 12.5. Well controlled with Lantus 14 units HS, will continue.  - Continue to hold metformin and Victoza for now, resume on discharge.        Hypoalbuminemia- (present on admission)   Assessment & Plan    - Continue to monitor.        Normocytic anemia- (present on admission)   Assessment & Plan    - Likely anemia of chronic disease, with markedly elevated ferritin.   - Continue to monitor, transfuse for hemoglobin <7.            Reviewed items::  Labs reviewed, Medications reviewed and Radiology images reviewed  Mei catheter::  No Mei  DVT prophylaxis pharmacological::  Enoxaparin (Lovenox)  Ulcer Prophylaxis::  Not indicated  Antibiotics:  Treating active infection/contamination beyond 24 hours perioperative coverage

## 2018-02-04 NOTE — PROGRESS NOTES
Pulmonary Critical Care Progress Note        Chief Complaint: Inpatient follow up for productive cough, fever    History of Present Illness: 57 y.o. male with history of DM type 2 presented to the ER as a transfer from [place]. He had symptoms of productive cough for 02 weeks duration accompanied by fever and sweating off and on. Expectoration is greenish brown. No hemoptysis.   Patient is a  and reports being exposed to sick contacts on a regular basis. He also has environmental exposure to dust, smoke and chemical toxins. He reports history of pneumonia twice previously. CT chest shows bilateral upper lobe consolidation with necrotic cavitary lesion and bilateral lower lobe patchy densities. Currently being treated empirically with antibiotics and antifungals. He hypokalemia and hyponatremia and hyperglycemia.    ROS: Constitutional: Fever off and on, with sweating, generalized weakness: Respiratory: Dyspnea, productive cough, no pleuritic chest pain. No wheeze, Cardiac: Denies palpitations, angina, orthopnea or paroxysmal nocturnal dyspnea , GI: No nausea, vomiting abdominal pain.  All other systems negative. Neuro: Denies headache, dizziness or seizures. Gu: No hematuria or dysuria, Musculoskeletal: No joint inflammation or arthralgia.    Interval Events:  1/30/18: Hyperglycemia has improved. Frequency of cough decreased with copious expectoration. Hydration status has improved. Normal appetite. No dysuria    1/31/17: No new complaints, however hypokalemia has worsened but hyponatremia has improved. Patient is NPO for bronchoscopy today. Will replace potassium and then proceed with bronchoscopy. Blood glucose levels improved. Afebrile. Has productive cough but no chest pain.    2/1/18: Bronchoscopy with BAL, brushings, TBBx  RUL,JEANE was performed yesterday. Mucopurulent secretions from bot upper lobes was removed. Patient was kept in telemetry overnight. He is comfortable today morning. Denies chest  pain. Has mild cough. No hemoptysis. No wheeze. Afebrile. ID splt has changed vancomycin to Unasyn. Had hypokalemia for which potassium supplementation was done prior to bronchoscopy. Hyperglycemia uncontrolled.    2/2/18: Patient feels much better clinically. Denies chest pain , afebrile. No hemoptysis.     2/3/18: No new change in last 24 hours. Hypokalemia is resolved. Patient remains afebrile. Has productive cough. No AFB or fungal elements seen on BAL, or brushing and biopsy specimens. No malignant cells seen. On Unasyn for staphylococcal necrotizing pneumonia and empirically on voriconazole for possible fungal pneumonia with cavitations.    2/4/18: Patient admits improvement in clinical status. Afebrile. No chest pain or hemoptysis. SpO2 improved and now not on O2 supplementation.  Appetite is good. No skin rash, joint pain.       PFSH:  No change.    Respiratory:    Pulse oximetry 94% on room air  Exam: Not in respiratory distress  Accessory muscles of respiration are not prominent. No cervical lymphadenopathy  Bilateral breath sounds audible with no wheeze or crepitations. No pleural rub.  Imaging: CT chest personally reviewed.  Bilateral upper lobe cavitary lesions thick walled with surrounding consolidation.  RML,RLL and LLL patchy infiltrates. No effusion    HemoDynamics:  Pulse: 83  Blood Pressure: 123/73      Exam: No JVD, no carotid bruit  First and second heart sounds audible. No murmur or pericardial rub  No dependent edema    Neuro:  GCS 15    Exam: Alert and orientated to time place and person.   Tendon jerks intact. No focal neurological deficit    Fluids:  Intake/Output       02/02/18 0700 - 02/03/18 0659 02/03/18 0700 - 02/04/18 0659 02/04/18 0700 - 02/05/18 0659      2023-7013 9065-2056 Total 6241-0953 2608-5166 Total 5029-8783 5223-1875 Total       Intake    P.O.  1300  360 1660  840  480 1320  240  -- 240    P.O. 7211 989 0753  240 -- 240    I.V.  --  -- --  200  -- 200  --  -- --     IV Piggyback Volume -- -- -- 200 -- 200 -- -- --    Total Intake 8045 158 8358 1313 143 5978 240 -- 240       Output    Urine  --  2110  1500  400 1900  --  -- --    Void (ml) -- 2110 6869 709 6463 -- -- --    Total Output -- 2110 5230 483 0039 -- -- --       Net I/O     1300 -1750 -450 -460 80 -380 240 -- 240        Weight: 92 kg (202 lb 13.2 oz)  Recent Labs      18   0243  18   0305  18   0209   SODIUM  135  134*  134*   POTASSIUM  3.2*  3.8  4.0   CHLORIDE  103  102  104   CO2  25  23  23   BUN  7*  6*  5*   CREATININE  0.53  0.63  0.45*   MAGNESIUM  1.7   --    --    PHOSPHORUS  2.6  2.8  2.8   CALCIUM  7.4*  7.7*  7.3*       GI/Nutrition:  Exam: Oral mucosa is moist. No oral thrush   Abdomen is soft, non tender. Bowel sounds audible.    Liver Function  Recent Labs      18   0243  18   0305  18   0209   GLUCOSE  154*  131*  137*       Heme:  No results for input(s): RBC, HEMOGLOBIN, HEMATOCRIT, PLATELETCT, PROTHROMBTM, APTT, INR, IRON, FERRITIN, TOTIRONBC in the last 72 hours.    Infectious Disease:  Temp  Av.9 °C (98.4 °F)  Min: 36.7 °C (98.1 °F)  Max: 37.2 °C (98.9 °F)  Micro: Blood cultures no growth   Sputum Gram stain: Many WBCs and Gram positive cocci  Sputum culture: Staphylococcus sensitive to unasyn  BAL, Brushing and TBBx: No fungal elements, No AFB. No malignant cells seen  Fungal serology: Positive for coccidioides IgG. Neg IgM      Current Facility-Administered Medications   Medication Dose Frequency Provider Last Rate Last Dose   • insulin glargine (LANTUS) injection 14 Units  14 Units Q EVENING Tyson Bianchi M.D.   14 Units at 18   • potassium chloride SA (Kdur) tablet 40 mEq  40 mEq BID Tyson Bianchi M.D.   40 mEq at 18 0730   • enoxaparin (LOVENOX) inj 40 mg  40 mg DAILY Muhammad K Gondal, M.D.   40 mg at 18   • NS infusion   Continuous Muhammad K Gondal, M.D. 10 mL/hr at 18     •  ampicillin/sulbactam (UNASYN) 3 g in  mL IVPB  3 g Q6HRS Re Bermudez M.D.   Stopped at 02/04/18 0443   • mag hydrox-al hydrox-simeth (MAALOX PLUS ES or MYLANTA DS) suspension 10 mL  10 mL 4X/DAY PRN Lito Giraldo M.D.   10 mL at 01/29/18 1251   • ipratropium-albuterol (DUONEB) nebulizer solution 3 mL  3 mL Q4H PRN (RT) Lito Giraldo M.D.   3 mL at 02/02/18 1706   • senna-docusate (PERICOLACE or SENOKOT S) 8.6-50 MG per tablet 2 Tab  2 Tab BID Brandon Mota M.D.   2 Tab at 01/31/18 0811    And   • polyethylene glycol/lytes (MIRALAX) PACKET 1 Packet  1 Packet QDAY PRN Brandon Mota M.D.        And   • magnesium hydroxide (MILK OF MAGNESIA) suspension 30 mL  30 mL QDAY PRN Brandon Mota M.D.        And   • bisacodyl (DULCOLAX) suppository 10 mg  10 mg QDAY PRN Brandon Mota M.D.       • Respiratory Care per Protocol   Continuous RT Brandon Mota M.D.       • acetaminophen (TYLENOL) tablet 650 mg  650 mg Q6HRS PRN Brandon Mota M.D.       • Pharmacy Consult Request ...Pain Management Review   PRN Brandon Mota M.D.        And   • oxycodone immediate-release (ROXICODONE) tablet 2.5 mg  2.5 mg Q3HRS PRN Brandon Mota M.D.   Stopped at 02/01/18 1535    And   • oxycodone immediate-release (ROXICODONE) tablet 5 mg  5 mg Q3HRS PRLULU Mota M.D.   5 mg at 02/04/18 0730    And   • morphine (pf) 4 mg/ml injection 2 mg  2 mg Q3HRS PRN Brandon Mota M.D.       • insulin regular (HUMULIN R) injection 2-9 Units  2-9 Units 4X/DAY ACHS Brandon Mota M.D.   2 Units at 02/03/18 2039    And   • glucose 4 g chewable tablet 16 g  16 g Q15 MIN PRN Brandon Mota M.D.        And   • dextrose 50% (D50W) injection 25 mL  25 mL Q15 MIN PRN Brandon Mota M.D.       • ondansetron (ZOFRAN) syringe/vial injection 4 mg  4 mg Q4HRS PRN Brandon Mota M.D.       • ondansetron (ZOFRAN ODT) dispertab 4 mg  4 mg Q4HRS PRN Brandon Mota,  M.D.       • promethazine (PHENERGAN) tablet 12.5-25 mg  12.5-25 mg Q4HRS PRN Brandon Mota M.D.       • promethazine (PHENERGAN) suppository 12.5-25 mg  12.5-25 mg Q4HRS PRN Brandon Mota M.D.       • prochlorperazine (COMPAZINE) injection 5-10 mg  5-10 mg Q4HRS PRN Brandon Mota M.D.       • zolpidem (AMBIEN) tablet 5 mg  5 mg HS PRN - MR X 1 Brandon Mota M.D.   5 mg at 02/02/18 2201   • ibuprofen (MOTRIN) tablet 400 mg  400 mg Q6HRS PRN Brandon Mota M.D.       • guaiFENesin (ROBITUSSIN) 100 MG/5ML solution 200 mg  10 mL Q4HRS PRN Brandon Mota M.D.   200 mg at 02/04/18 0729   • voriconazole (VFEND) tablet 200 mg  200 mg Q12HRS Donald Ga M.D.   200 mg at 02/04/18 0729     Last reviewed on 1/31/2018  3:30 PM by Evonne Trent R.N.    Quality  Measures:   Labs reviewed, Medications reviewed and Radiology images reviewed   Mei catheter: No Mei       DVT Prophylaxis: Enoxaparin (Lovenox)          Problems:  Necrotizing pneumonia bilateral upper lobes/ Lung abscess. S/p bronchoscopy  Diabetes Mellitus type 2 controlled  Hypokalemia resolved.      Plan:  Already on Voriconazole and Unasyn, ID following  I agree with prolonged antibiotic treatment.  Incentive spirometry, Flutter valve  Fungal cultures pending will follow  Continue bronchodilators      Management paln was discussed with patient .

## 2018-02-05 ENCOUNTER — APPOINTMENT (OUTPATIENT)
Dept: RADIOLOGY | Facility: MEDICAL CENTER | Age: 58
DRG: 166 | End: 2018-02-05
Attending: INTERNAL MEDICINE
Payer: OTHER MISCELLANEOUS

## 2018-02-05 LAB
ALBUMIN SERPL BCP-MCNC: 1.8 G/DL (ref 3.2–4.9)
ANION GAP SERPL CALC-SCNC: 6 MMOL/L (ref 0–11.9)
BUN SERPL-MCNC: 5 MG/DL (ref 8–22)
CALCIUM SERPL-MCNC: 7.5 MG/DL (ref 8.5–10.5)
CHLORIDE SERPL-SCNC: 105 MMOL/L (ref 96–112)
CO2 SERPL-SCNC: 24 MMOL/L (ref 20–33)
CREAT SERPL-MCNC: 0.55 MG/DL (ref 0.5–1.4)
GLUCOSE BLD-MCNC: 145 MG/DL (ref 65–99)
GLUCOSE BLD-MCNC: 165 MG/DL (ref 65–99)
GLUCOSE BLD-MCNC: 208 MG/DL (ref 65–99)
GLUCOSE BLD-MCNC: 232 MG/DL (ref 65–99)
GLUCOSE SERPL-MCNC: 147 MG/DL (ref 65–99)
PHOSPHATE SERPL-MCNC: 3.3 MG/DL (ref 2.5–4.5)
POTASSIUM SERPL-SCNC: 4.6 MMOL/L (ref 3.6–5.5)
SODIUM SERPL-SCNC: 135 MMOL/L (ref 135–145)

## 2018-02-05 PROCEDURE — G8989 SELF CARE D/C STATUS: HCPCS | Mod: CH

## 2018-02-05 PROCEDURE — G8987 SELF CARE CURRENT STATUS: HCPCS | Mod: CH

## 2018-02-05 PROCEDURE — 700102 HCHG RX REV CODE 250 W/ 637 OVERRIDE(OP): Performed by: HOSPITALIST

## 2018-02-05 PROCEDURE — 36569 INSJ PICC 5 YR+ W/O IMAGING: CPT

## 2018-02-05 PROCEDURE — 770020 HCHG ROOM/CARE - TELE (206)

## 2018-02-05 PROCEDURE — 93321 DOPPLER ECHO F-UP/LMTD STD: CPT

## 2018-02-05 PROCEDURE — 80069 RENAL FUNCTION PANEL: CPT

## 2018-02-05 PROCEDURE — 700111 HCHG RX REV CODE 636 W/ 250 OVERRIDE (IP)

## 2018-02-05 PROCEDURE — A9270 NON-COVERED ITEM OR SERVICE: HCPCS | Performed by: HOSPITALIST

## 2018-02-05 PROCEDURE — 700102 HCHG RX REV CODE 250 W/ 637 OVERRIDE(OP): Performed by: INTERNAL MEDICINE

## 2018-02-05 PROCEDURE — 700111 HCHG RX REV CODE 636 W/ 250 OVERRIDE (IP): Performed by: INTERNAL MEDICINE

## 2018-02-05 PROCEDURE — 36415 COLL VENOUS BLD VENIPUNCTURE: CPT

## 2018-02-05 PROCEDURE — 700105 HCHG RX REV CODE 258: Performed by: INTERNAL MEDICINE

## 2018-02-05 PROCEDURE — G8979 MOBILITY GOAL STATUS: HCPCS | Mod: CI

## 2018-02-05 PROCEDURE — G8980 MOBILITY D/C STATUS: HCPCS | Mod: CI

## 2018-02-05 PROCEDURE — 02HV33Z INSERTION OF INFUSION DEVICE INTO SUPERIOR VENA CAVA, PERCUTANEOUS APPROACH: ICD-10-PCS | Performed by: INTERNAL MEDICINE

## 2018-02-05 PROCEDURE — G8978 MOBILITY CURRENT STATUS: HCPCS | Mod: CI

## 2018-02-05 PROCEDURE — 93312 ECHO TRANSESOPHAGEAL: CPT

## 2018-02-05 PROCEDURE — B54NZZA ULTRASONOGRAPHY OF LEFT UPPER EXTREMITY VEINS, GUIDANCE: ICD-10-PCS | Performed by: INTERNAL MEDICINE

## 2018-02-05 PROCEDURE — 97161 PT EVAL LOW COMPLEX 20 MIN: CPT

## 2018-02-05 PROCEDURE — G8988 SELF CARE GOAL STATUS: HCPCS | Mod: CH

## 2018-02-05 PROCEDURE — A9270 NON-COVERED ITEM OR SERVICE: HCPCS | Performed by: INTERNAL MEDICINE

## 2018-02-05 PROCEDURE — 700101 HCHG RX REV CODE 250: Performed by: FAMILY MEDICINE

## 2018-02-05 PROCEDURE — 99232 SBSQ HOSP IP/OBS MODERATE 35: CPT | Performed by: INTERNAL MEDICINE

## 2018-02-05 PROCEDURE — 82962 GLUCOSE BLOOD TEST: CPT | Mod: 91

## 2018-02-05 PROCEDURE — 97165 OT EVAL LOW COMPLEX 30 MIN: CPT

## 2018-02-05 RX ORDER — MIDAZOLAM HYDROCHLORIDE 1 MG/ML
INJECTION INTRAMUSCULAR; INTRAVENOUS
Status: COMPLETED
Start: 2018-02-05 | End: 2018-02-05

## 2018-02-05 RX ADMIN — AMPICILLIN SODIUM AND SULBACTAM SODIUM 3 G: 2; 1 INJECTION, POWDER, FOR SOLUTION INTRAMUSCULAR; INTRAVENOUS at 22:00

## 2018-02-05 RX ADMIN — GUAIFENESIN 600 MG: 600 TABLET, EXTENDED RELEASE ORAL at 20:29

## 2018-02-05 RX ADMIN — GUAIFENESIN 600 MG: 600 TABLET, EXTENDED RELEASE ORAL at 08:40

## 2018-02-05 RX ADMIN — AMPICILLIN SODIUM AND SULBACTAM SODIUM 3 G: 2; 1 INJECTION, POWDER, FOR SOLUTION INTRAMUSCULAR; INTRAVENOUS at 17:46

## 2018-02-05 RX ADMIN — IPRATROPIUM BROMIDE AND ALBUTEROL SULFATE 3 ML: .5; 3 SOLUTION RESPIRATORY (INHALATION) at 23:59

## 2018-02-05 RX ADMIN — INSULIN GLARGINE 14 UNITS: 100 INJECTION, SOLUTION SUBCUTANEOUS at 20:40

## 2018-02-05 RX ADMIN — AMPICILLIN SODIUM AND SULBACTAM SODIUM 3 G: 2; 1 INJECTION, POWDER, FOR SOLUTION INTRAMUSCULAR; INTRAVENOUS at 08:47

## 2018-02-05 RX ADMIN — GUAIFENESIN 200 MG: 100 SOLUTION ORAL at 04:18

## 2018-02-05 RX ADMIN — INSULIN HUMAN 3 UNITS: 100 INJECTION, SOLUTION PARENTERAL at 17:50

## 2018-02-05 RX ADMIN — VORICONAZOLE 200 MG: 200 TABLET, FILM COATED ORAL at 08:40

## 2018-02-05 RX ADMIN — ENOXAPARIN SODIUM 40 MG: 100 INJECTION SUBCUTANEOUS at 08:41

## 2018-02-05 RX ADMIN — INSULIN HUMAN 3 UNITS: 100 INJECTION, SOLUTION PARENTERAL at 20:40

## 2018-02-05 RX ADMIN — AMPICILLIN SODIUM AND SULBACTAM SODIUM 3 G: 2; 1 INJECTION, POWDER, FOR SOLUTION INTRAMUSCULAR; INTRAVENOUS at 04:18

## 2018-02-05 RX ADMIN — POTASSIUM CHLORIDE 40 MEQ: 1500 TABLET, EXTENDED RELEASE ORAL at 08:41

## 2018-02-05 RX ADMIN — INSULIN HUMAN 2 UNITS: 100 INJECTION, SOLUTION PARENTERAL at 12:38

## 2018-02-05 RX ADMIN — VORICONAZOLE 200 MG: 200 TABLET, FILM COATED ORAL at 20:30

## 2018-02-05 RX ADMIN — MIDAZOLAM 5 MG: 1 INJECTION INTRAMUSCULAR; INTRAVENOUS at 13:15

## 2018-02-05 RX ADMIN — GUAIFENESIN 200 MG: 100 SOLUTION ORAL at 20:30

## 2018-02-05 RX ADMIN — FENTANYL CITRATE 100 MCG: 50 INJECTION, SOLUTION INTRAMUSCULAR; INTRAVENOUS at 13:15

## 2018-02-05 RX ADMIN — POTASSIUM CHLORIDE 40 MEQ: 1500 TABLET, EXTENDED RELEASE ORAL at 20:30

## 2018-02-05 ASSESSMENT — ENCOUNTER SYMPTOMS
ABDOMINAL PAIN: 0
VOMITING: 0
DIARRHEA: 0
FEVER: 0
CHILLS: 0
COUGH: 1
HEMOPTYSIS: 0
SPUTUM PRODUCTION: 1
NAUSEA: 0
SHORTNESS OF BREATH: 0
WHEEZING: 0

## 2018-02-05 ASSESSMENT — PAIN SCALES - GENERAL
PAINLEVEL_OUTOF10: 3
PAINLEVEL_OUTOF10: 3
PAINLEVEL_OUTOF10: 0
PAINLEVEL_OUTOF10: 0
PAINLEVEL_OUTOF10: 4

## 2018-02-05 ASSESSMENT — COGNITIVE AND FUNCTIONAL STATUS - GENERAL
SUGGESTED CMS G CODE MODIFIER DAILY ACTIVITY: CH
MOBILITY SCORE: 24
DAILY ACTIVITIY SCORE: 24
SUGGESTED CMS G CODE MODIFIER MOBILITY: CH

## 2018-02-05 ASSESSMENT — GAIT ASSESSMENTS
DISTANCE (FEET): 200
GAIT LEVEL OF ASSIST: SUPERVISED

## 2018-02-05 ASSESSMENT — ACTIVITIES OF DAILY LIVING (ADL): TOILETING: INDEPENDENT

## 2018-02-05 NOTE — PROGRESS NOTES
PICC Insertion Procedure Note    Consents confirmed, vessel patency confirmed with ultrasound, real time ultrasound image printed and placed in patient chart. Risks and benefits of procedure explained to patient/family and education regarding central line associated bloodstream infections provided. Questions answered.     PICC placed in LUE per MD order with ultrasound guidance. 4 Fr, single lumen PICC placed in basilic vein after 1 attempt(s). 2 cc's of 1% lidocaine injected intradermally, 21 gauge microintroducer needle and modified Seldinger technique used. 46 cm total catheter length, 0 cm external measurement inserted with good blood return. Each lumen flushed without resistance with 10 mL 0.9% normal saline. PICC line secured with Biopatch, statlock and Tegaderm.    PICC tip location in the SVC confirmed by ECG technology.  Patient tolerated procedure well.  Patient condition relayed to unit RN or ordering physician via this post procedure note in the EMR.     C[employer]mon Power PICC ref # [id number], Lot # [id number]

## 2018-02-05 NOTE — PROGRESS NOTES
Pt back from [facility]. VSS. No distress noted. Monitor on and monitor room notified. Post op vitals in place. Will continue to monitor.

## 2018-02-05 NOTE — PROGRESS NOTES
Received bedside report from Davide RN, Pt assessed, A&Ox4, Vitals stable, pt has 3/10 tolerable pain. No SOB Noted.  Pt updated on plan of care, Call light within reach, personal belongings within reach.  Bed is locked and in lowest position.  Pt ambulates via upself. Tele monitor on. Will continue to monitor. Hourly rounding in place.

## 2018-02-05 NOTE — DISCHARGE PLANNING
Medical Social Work    Met with pt at bedside to discuss discharge. Pt would like to complete IV infusions in [place]. Pt states he's established with Dr. Rodriguez [phone]. Called PCP office and spoke with [place] to see if PCP will follow pt's order. Faxed H&P, recent MD and ID note to [phone].

## 2018-02-05 NOTE — CARE PLAN
Problem: Communication  Goal: The ability to communicate needs accurately and effectively will improve    Intervention: Educate patient and significant other/support system about the plan of care, procedures, treatments, medications and allow for questions  Listened to patients discussion of concerns related to disease process and treatment plan. Discussed with patient concerns, goals and management. Discussed importance of patient reporting new signs and symptoms related to disease process. Patient verbalized understanding and understands importance of communicating needs.  Supported and educated patient by addressing specific questions regarding diagnosis, risks, benefits of pain management treatment.                Problem: Safety  Goal: Will remain free from falls    Intervention: Implement fall precautions  No slip socks on pt. Call light and personal belongings within reach. Pt verbalized understanding of using call light for assistance.

## 2018-02-05 NOTE — PROGRESS NOTES
Bedside report with GIA Cabrera   Patient sitting up in bed watching TV . A+Ox 4 , RA ,SR .Plan of care discussed. Patient to go for PICC placement tomorrow due to need for long term abx. Patient also has JOI order but no date. Will keel patient NPO s/p midnight. Questions answered. Call bell and personal belongings within reach. Will continue to monitor.

## 2018-02-05 NOTE — PROGRESS NOTES
[facility] Hospitalist Progress Note    Date of Service: 2/5/2018    Chief Complaint  57 y.o. Male who is a  [place]  with type II diabetes mellitus, and hypertension, admitted 1/28/2018 with fevers, chills, nausea, vomiting and cough. Initially seen at [facility], where he was noted to have leukocytosis, and hyponatremia, with CT scan of the chest showing multiple cavitary lung lesions in the upper lobe. Started empirically on broad-spectrum antibiotics and antifungals. ID and pulmonary was consulted. Fungal serology sent. Sputum Gram stain showed gram-positive cocci which came back as MSSA. HIV was negative. HbA1c 12, started on long-term insulin. Her low potassium, and low normal magnesium, which were both replaced. S/p FOB and BAL, which showed bilateral purulent secretions in the right upper lobe and left upper lobe. BAL bacterial fungal cultures in progress. ID changed antibiotics to unasyn. Low potassium replaced. Procalcitonin trended down. Weaned off oxygen, and he was able to ambulate, with transient and mild desaturations. ID recommending at least 2 weeks of IV antibiotics. BAL cytology did not show any malignancy, AFB organisms, or fungal elements. Coccidioides IgG positive, negative IgM. Aspergillus antibodies negative. BAL wash cultures grew light growth of staph aureus.    Interval Problem Update  2/5/2018 - no overnight events. Remains hemodynamically stable and afebrile. Stable on RA.  BNP unimpressive except for low albumin. FBG well controlled at 146. Awaiting PICC. Scheduled for JOI today.     > Seen and examined. Doing well, (+) occasioanl cough with yellowish phlegm. No nausea, vomiting, abd pain. (+) BM. No abd pain.       Consultants/Specialty  Pulmonology  ID    Disposition  Monitor on the floors. Needs OPIC once medically cleared. Awaiting PICC line placement.      ROS     Pertinent positives/negatives as mentioned above.     A complete review of systems was done. All other  systems were negative.        Physical Exam  Laboratory/Imaging   Hemodynamics  Temp (24hrs), Av.1 °C (98.8 °F), Min:36.9 °C (98.5 °F), Max:37.4 °C (99.4 °F)   Temperature: 37 °C (98.6 °F)  Pulse  Av.7  Min: 65  Max: 119   Blood Pressure: 120/76      Respiratory      Respiration: 18, Pulse Oximetry: 90 %     Work Of Breathing / Effort: Mild  RUL Breath Sounds: Clear, RML Breath Sounds: Clear, RLL Breath Sounds: Diminished, JEANE Breath Sounds: Clear, LLL Breath Sounds: Diminished    Fluids    Intake/Output Summary (Last 24 hours) at 18 0715  Last data filed at 18 0600   Gross per 24 hour   Intake              880 ml   Output             1710 ml   Net             -830 ml       Nutrition  Orders Placed This Encounter   Procedures   • DIET ORDER     Standing Status:   Standing     Number of Occurrences:   1     Order Specific Question:   Diet:     Answer:   Diabetic [3]     Physical Exam   Constitutional: He is oriented to person, place, and time. He appears well-developed and well-nourished. No distress.   HENT:   Head: Normocephalic and atraumatic.   Mouth/Throat: Oropharynx is clear and moist. No oropharyngeal exudate.   Eyes: EOM are normal. Pupils are equal, round, and reactive to light. Right eye exhibits no discharge. Left eye exhibits no discharge. No scleral icterus.   Neck: Normal range of motion. Neck supple. No thyromegaly present.   Cardiovascular: Normal rate and regular rhythm.  Exam reveals no gallop and no friction rub.    No murmur heard.  Pulmonary/Chest: Effort normal and breath sounds normal. He has no wheezes. He has no rales. He exhibits no tenderness.   Abdominal: Soft. Bowel sounds are normal. There is no tenderness. There is no rebound and no guarding.   Musculoskeletal: Normal range of motion. He exhibits no edema or tenderness.   Lymphadenopathy:     He has no cervical adenopathy.   Neurological: He is alert and oriented to person, place, and time. No cranial nerve  deficit. Coordination normal.   Skin: Skin is warm and dry. No rash noted. He is not diaphoretic. No erythema.   Psychiatric: He has a normal mood and affect. His behavior is normal. Thought content normal.   Vitals reviewed.        Recent Labs      02/03/18   0305  02/04/18   0209  02/05/18   0213   SODIUM  134*  134*  135   POTASSIUM  3.8  4.0  4.6   CHLORIDE  102  104  105   CO2  23  23  24   GLUCOSE  131*  137*  147*   BUN  6*  5*  5*   CREATININE  0.63  0.45*  0.55   CALCIUM  7.7*  7.3*  7.5*                      Assessment/Plan     * Necrotizing pneumonia due to MSSA (CMS-HCC)- (present on admission)   Assessment & Plan    - Sputum culture growing staph aureus/MSSA, which could certainly cause cavitary/necrotizing pneumonia. Cocci IgG positive. Scheduled for JOI today.   - Continue Unasyn for MSSA, and empiric voriconazole. Will need 2 weeks course of IV followed by PO antibiotics per ID. Pending PICC placement. Anticipate OPIC, and be changed to ertapenem if going home.   - Continue to follow fungal BAL cultures.   - Continue respiratory support per RT protocol. Continue PRN O2 supplements.        Acute respiratory failure with hypoxia due to necrotizing pneumonia (CMS-HCC)- (present on admission)   Assessment & Plan    - Resolved. Doing well off oxygen supplements.  - Continue antibiotics/antifungal for necrotizing pneumonia. Continue respiratory support with PRN bronchodilators, and PRN oxygen supplement.        Cavitary lesion of lung- (present on admission)   Assessment & Plan    - Likely necrotizing pneumonia from staph aureus. Awaiting fungal culture of BAL. BAL cytology did not show any malignancy, AFB organisms, or fungal elements.   - Continue antibiotics and antifungals.        Hypocalcemia- (present on admission)   Assessment & Plan    - Corrected calcium is 10.6 (normal).         Hypokalemia- (present on admission)   Assessment & Plan    - Continue 40 mEq oral K Dur BID.         Hyponatremia-  (present on admission)   Assessment & Plan    - Likely due to SIADH from lung process. Normal today. Continue to monitor, BMP in AM.        Uncontrolled type II diabetes mellitus (CMS-HCC)- (present on admission)   Assessment & Plan    - HbA1c 12.5. Well controlled with Lantus 14 units HS, will continue.  - Continue to hold metformin and Victoza for now, resume on discharge.        Hypoalbuminemia- (present on admission)   Assessment & Plan    - Continue to monitor.        Normocytic anemia- (present on admission)   Assessment & Plan    - Likely anemia of chronic disease, with markedly elevated ferritin.   - Hgb stable.            Reviewed items::  Labs reviewed, Medications reviewed and Radiology images reviewed  Mei catheter::  No Mei  DVT prophylaxis pharmacological::  Enoxaparin (Lovenox)  Ulcer Prophylaxis::  Not indicated  Antibiotics:  Treating active infection/contamination beyond 24 hours perioperative coverage

## 2018-02-05 NOTE — THERAPY
"Occupational Therapy Evaluation completed.   Functional Status:  Pt is a 58 y/o male who was admitted for sputum and cough, later diagnosed with cavitary lung growth. Pt was independent in mobility to EOB, ambulated 250' with no assist needed. Pt does not require further OT services at this time.   Plan of Care: Patient with no further skilled OT needs in the acute care setting at this time  Discharge Recommendations:  Equipment: No Equipment Needed. Post-acute therapy Currently anticipate no further skilled therapy needs once patient is discharged from the inpatient setting.    See \"Rehab Therapy-Acute\" Patient Summary Report for complete documentation.    "

## 2018-02-05 NOTE — PROGRESS NOTES
Pulmonary Critical Care Progress Note        Chief Complaint: Inpatient follow up necrotizing pneumonia adilson    History of Present Illness: 57 y.o. male with history of DM type 2 presented to the ER as a transfer from [place]. He had symptoms of productive cough for 02 weeks duration accompanied by fever and sweating off and on. Expectoration is greenish brown. No hemoptysis.   Patient is a  and reports being exposed to sick contacts on a regular basis. He also has environmental exposure to dust, smoke and chemical toxins. He reports history of pneumonia twice previously. CT chest shows bilateral upper lobe consolidation with necrotic cavitary lesion and bilateral lower lobe patchy densities. Currently being treated empirically with antibiotics and antifungals. He hypokalemia and hyponatremia and hyperglycemia.    ROS: Constitutional: Fever off and on, with sweating, generalized weakness: Respiratory: Dyspnea, productive cough, no pleuritic chest pain. No wheeze, Cardiac: Denies palpitations, angina, orthopnea or paroxysmal nocturnal dyspnea , GI: No nausea, vomiting abdominal pain.  All other systems negative. Neuro: Denies headache, dizziness or seizures. Gu: No hematuria or dysuria, Musculoskeletal: No joint inflammation or arthralgia.    Interval Events:  1/30/18: Hyperglycemia has improved. Frequency of cough decreased with copious expectoration. Hydration status has improved. Normal appetite. No dysuria    1/31/17: No new complaints, however hypokalemia has worsened but hyponatremia has improved. Patient is NPO for bronchoscopy today. Will replace potassium and then proceed with bronchoscopy. Blood glucose levels improved. Afebrile. Has productive cough but no chest pain.    2/1/18: Bronchoscopy with BAL, brushings, TBBx  RUL,JEANE was performed yesterday. Mucopurulent secretions from bot upper lobes was removed. Patient was kept in telemetry overnight. He is comfortable today morning. Denies chest  pain. Has mild cough. No hemoptysis. No wheeze. Afebrile. ID splt has changed vancomycin to Unasyn. Had hypokalemia for which potassium supplementation was done prior to bronchoscopy. Hyperglycemia uncontrolled.    2/2/18: Patient feels much better clinically. Denies chest pain , afebrile. No hemoptysis.     2/3/18: No new change in last 24 hours. Hypokalemia is resolved. Patient remains afebrile. Has productive cough. No AFB or fungal elements seen on BAL, or brushing and biopsy specimens. No malignant cells seen. On Unasyn for staphylococcal necrotizing pneumonia and empirically on voriconazole for possible fungal pneumonia with cavitations.    2/4/18: Patient admits improvement in clinical status. Afebrile. No chest pain or hemoptysis. SpO2 improved and now not on O2 supplementation.  Appetite is good. No skin rash, joint pain.     2/5/18: Fever resolved. No chest pain. Still has productive cough but no hemoptysis. Scheduled for PICC line placement and possible JOI today.  Off O2 supplementation    PFSH:  No change.    Respiratory:    Pulse oximetry 94% on room air  Exam: Not in respiratory distress  Accessory muscles of respiration are not prominent. No cervical lymphadenopathy  Bilateral breath sounds audible with no wheeze or crepitations. No pleural rub.  Imaging: CT chest personally reviewed.  Bilateral upper lobe cavitary lesions thick walled with surrounding consolidation.  RML,RLL and LLL patchy infiltrates. No effusion    HemoDynamics:  Pulse: 85  Blood Pressure: 138/76      Exam: No JVD, no carotid bruit  First and second heart sounds audible. No murmur or pericardial rub  No dependent edema    Neuro:  GCS 15    Exam: Alert and orientated to time place and person.   Tendon jerks intact. No focal neurological deficit    Fluids:  Intake/Output       02/03/18 0700 - 02/04/18 0659 02/04/18 0700 - 02/05/18 0659 02/05/18 0700 - 02/06/18 0659      [id number] 0005-7720 Total 1983-9465 1732-0704 Total 4319-8838  5060-6535 Total       Intake    P.O.  840  480 1320  480  300 780  --  -- --    P.O.  480 300 780 -- -- --    I.V.  200  -- 200  100  -- 100  --  -- --    IV Piggyback Volume 200 -- 200 100 -- 100 -- -- --    Total Intake 4202 830 0599 580 300 880 -- -- --       Output    Urine  1500  400 1900  550  1160 1710  300  -- 300    Number of Times Voided -- -- -- 3 x -- 3 x -- -- --    Void (ml) 8373 939 8381 550 1160 1710 300 -- 300    Total Output 4033 786 4084 550 1160 1710 300 -- 300       Net I/O     -460 80 -380 30 -860 -830 -300 -- -300        Weight: 93.9 kg (207 lb 0.2 oz)  Recent Labs      18   0305  18   0209  18   0213   SODIUM  134*  134*  135   POTASSIUM  3.8  4.0  4.6   CHLORIDE  102  104  105   CO2  23  23  24   BUN  6*  5*  5*   CREATININE  0.63  0.45*  0.55   PHOSPHORUS  2.8  2.8  3.3   CALCIUM  7.7*  7.3*  7.5*       GI/Nutrition:  Exam: Oral mucosa is moist. No oral thrush   Abdomen is soft, non tender. Bowel sounds audible.    Liver Function  Recent Labs      18   0305  18   0209  18   0213   GLUCOSE  131*  137*  147*       Heme:  No results for input(s): RBC, HEMOGLOBIN, HEMATOCRIT, PLATELETCT, PROTHROMBTM, APTT, INR, IRON, FERRITIN, TOTIRONBC in the last 72 hours.    Infectious Disease:  Temp  Av.1 °C (98.7 °F)  Min: 36.8 °C (98.3 °F)  Max: 37.4 °C (99.4 °F)  Micro: Blood cultures no growth   Sputum Gram stain: Many WBCs and Gram positive cocci  Sputum culture: Staphylococcus sensitive to unasyn  BAL, Brushing and TBBx: No fungal elements, No AFB. No malignant cells seen  Fungal serology: Positive for coccidioides IgG. Neg IgM      Current Facility-Administered Medications   Medication Dose Frequency Provider Last Rate Last Dose   • guaiFENesin LA (MUCINEX) tablet 600 mg  600 mg Q12HRS Tyson Bianchi M.D.   600 mg at 18 0840   • insulin glargine (LANTUS) injection 14 Units  14 Units Q EVENING Tyson Bianchi M.D.   14 Units at 18 2130    • potassium chloride SA (Kdur) tablet 40 mEq  40 mEq BID Tyson Bianchi M.D.   40 mEq at 02/05/18 0841   • enoxaparin (LOVENOX) inj 40 mg  40 mg DAILY Muhammad K Gondal, M.D.   40 mg at 02/05/18 0841   • NS infusion   Continuous Muhammad K Gondal, M.D. 10 mL/hr at 02/03/18 2037     • ampicillin/sulbactam (UNASYN) 3 g in  mL IVPB  3 g Q6HRS Re Bermudez M.D.   Stopped at 02/05/18 0917   • mag hydrox-al hydrox-simeth (MAALOX PLUS ES or MYLANTA DS) suspension 10 mL  10 mL 4X/DAY PRN Lito Giraldo M.D.   10 mL at 01/29/18 1251   • ipratropium-albuterol (DUONEB) nebulizer solution 3 mL  3 mL Q4H PRN (RT) Lito Giraldo M.D.   3 mL at 02/02/18 1706   • senna-docusate (PERICOLACE or SENOKOT S) 8.6-50 MG per tablet 2 Tab  2 Tab BID Brandon Mota M.D.   2 Tab at 01/31/18 0811    And   • polyethylene glycol/lytes (MIRALAX) PACKET 1 Packet  1 Packet QDAY PRN Brandon Mota M.D.        And   • magnesium hydroxide (MILK OF MAGNESIA) suspension 30 mL  30 mL QDAY PRN Brandon Mota M.D.        And   • bisacodyl (DULCOLAX) suppository 10 mg  10 mg QDAY PRN Brandon Mota M.D.       • Respiratory Care per Protocol   Continuous RT Brandon Mota M.D.       • acetaminophen (TYLENOL) tablet 650 mg  650 mg Q6HRS PRN Brandon Mota M.D.       • Pharmacy Consult Request ...Pain Management Review   PRN Brandon Mota M.D.        And   • oxycodone immediate-release (ROXICODONE) tablet 2.5 mg  2.5 mg Q3HRS PRN Brandon Mota M.D.   Stopped at 02/01/18 1535    And   • oxycodone immediate-release (ROXICODONE) tablet 5 mg  5 mg Q3HRS PRN Brandon Mota M.D.   5 mg at 02/04/18 2133    And   • morphine (pf) 4 mg/ml injection 2 mg  2 mg Q3HRS PRN Brandon Mota M.D.       • insulin regular (HUMULIN R) injection 2-9 Units  2-9 Units 4X/DAY LANI Mota M.D.   Stopped at 02/05/18 0700    And   • glucose 4 g chewable tablet 16 g  16 g Q15 MIN PRN Brandon  DENY Mota        And   • dextrose 50% (D50W) injection 25 mL  25 mL Q15 MIN PRN Brandon Mota M.D.       • ondansetron (ZOFRAN) syringe/vial injection 4 mg  4 mg Q4HRS PRN Brandon Mota M.D.       • ondansetron (ZOFRAN ODT) dispertab 4 mg  4 mg Q4HRS PRN Brandon Mota M.D.       • promethazine (PHENERGAN) tablet 12.5-25 mg  12.5-25 mg Q4HRS PRN Brandon Mota M.D.       • promethazine (PHENERGAN) suppository 12.5-25 mg  12.5-25 mg Q4HRS PRN Brandon Mota M.D.       • prochlorperazine (COMPAZINE) injection 5-10 mg  5-10 mg Q4HRS PRN Brandon Mota M.D.       • zolpidem (AMBIEN) tablet 5 mg  5 mg HS PRN - MR X 1 Brandon Mota M.D.   5 mg at 02/04/18 2322   • ibuprofen (MOTRIN) tablet 400 mg  400 mg Q6HRS PRN Brandon Mota M.D.       • guaiFENesin (ROBITUSSIN) 100 MG/5ML solution 200 mg  10 mL Q4HRS PRN Brandon Mota M.D.   200 mg at 02/05/18 0418   • voriconazole (VFEND) tablet 200 mg  200 mg Q12HRS Donald Ga M.D.   200 mg at 02/05/18 0840     Last reviewed on 1/31/2018  3:30 PM by Evonne Trent R.N.    Quality  Measures:  Labs reviewed, Medications reviewed and Radiology images reviewed  Mei catheter: No Emi      DVT Prophylaxis: Enoxaparin (Lovenox)          Problems:  Necrotizing pneumonia bilateral upper lobes/ Lung abscess. S/p bronchoscopy  Diabetes Mellitus type 2 controlled  Hypokalemia / hyponatremia resolved.      Plan:  Continue Voriconazole and Unasyn  I agree with prolonged antibiotic treatment.  Incentive spirometry, Flutter valve  Fungal cultures pending will follow  Continue bronchodilators      Management paln was discussed with patient .

## 2018-02-06 PROBLEM — R91.8 MULTIPLE PULMONARY NODULES: Status: ACTIVE | Noted: 2018-02-06

## 2018-02-06 PROBLEM — J18.9 PNEUMONIA: Status: ACTIVE | Noted: 2018-02-06

## 2018-02-06 LAB
GLUCOSE BLD-MCNC: 148 MG/DL (ref 65–99)
GLUCOSE BLD-MCNC: 187 MG/DL (ref 65–99)
GLUCOSE BLD-MCNC: 220 MG/DL (ref 65–99)
GLUCOSE BLD-MCNC: 228 MG/DL (ref 65–99)

## 2018-02-06 PROCEDURE — 94760 N-INVAS EAR/PLS OXIMETRY 1: CPT

## 2018-02-06 PROCEDURE — A9270 NON-COVERED ITEM OR SERVICE: HCPCS | Performed by: INTERNAL MEDICINE

## 2018-02-06 PROCEDURE — 82962 GLUCOSE BLOOD TEST: CPT | Mod: 91

## 2018-02-06 PROCEDURE — 700111 HCHG RX REV CODE 636 W/ 250 OVERRIDE (IP): Performed by: INTERNAL MEDICINE

## 2018-02-06 PROCEDURE — 700102 HCHG RX REV CODE 250 W/ 637 OVERRIDE(OP): Performed by: INTERNAL MEDICINE

## 2018-02-06 PROCEDURE — 700102 HCHG RX REV CODE 250 W/ 637 OVERRIDE(OP): Performed by: HOSPITALIST

## 2018-02-06 PROCEDURE — 770006 HCHG ROOM/CARE - MED/SURG/GYN SEMI*

## 2018-02-06 PROCEDURE — A9270 NON-COVERED ITEM OR SERVICE: HCPCS | Performed by: HOSPITALIST

## 2018-02-06 PROCEDURE — 700105 HCHG RX REV CODE 258: Performed by: INTERNAL MEDICINE

## 2018-02-06 PROCEDURE — 94640 AIRWAY INHALATION TREATMENT: CPT

## 2018-02-06 PROCEDURE — 99232 SBSQ HOSP IP/OBS MODERATE 35: CPT | Performed by: INTERNAL MEDICINE

## 2018-02-06 RX ORDER — FLUCONAZOLE 100 MG/1
400 TABLET ORAL DAILY
Status: DISCONTINUED | OUTPATIENT
Start: 2018-02-06 | End: 2018-02-09 | Stop reason: HOSPADM

## 2018-02-06 RX ORDER — INSULIN GLARGINE 100 [IU]/ML
18 INJECTION, SOLUTION SUBCUTANEOUS EVERY EVENING
Status: DISCONTINUED | OUTPATIENT
Start: 2018-02-06 | End: 2018-02-09 | Stop reason: HOSPADM

## 2018-02-06 RX ADMIN — INSULIN HUMAN 3 UNITS: 100 INJECTION, SOLUTION PARENTERAL at 21:10

## 2018-02-06 RX ADMIN — GUAIFENESIN 200 MG: 100 SOLUTION ORAL at 12:16

## 2018-02-06 RX ADMIN — GUAIFENESIN 600 MG: 600 TABLET, EXTENDED RELEASE ORAL at 21:03

## 2018-02-06 RX ADMIN — OXYCODONE HYDROCHLORIDE 5 MG: 5 TABLET ORAL at 21:53

## 2018-02-06 RX ADMIN — VORICONAZOLE 200 MG: 200 TABLET, FILM COATED ORAL at 08:57

## 2018-02-06 RX ADMIN — ZOLPIDEM TARTRATE 5 MG: 5 TABLET, FILM COATED ORAL at 21:53

## 2018-02-06 RX ADMIN — AMPICILLIN SODIUM AND SULBACTAM SODIUM 3 G: 2; 1 INJECTION, POWDER, FOR SOLUTION INTRAMUSCULAR; INTRAVENOUS at 08:57

## 2018-02-06 RX ADMIN — GUAIFENESIN 600 MG: 600 TABLET, EXTENDED RELEASE ORAL at 08:57

## 2018-02-06 RX ADMIN — IBUPROFEN 400 MG: 800 TABLET, FILM COATED ORAL at 05:31

## 2018-02-06 RX ADMIN — GUAIFENESIN 200 MG: 100 SOLUTION ORAL at 05:34

## 2018-02-06 RX ADMIN — POTASSIUM CHLORIDE 40 MEQ: 1500 TABLET, EXTENDED RELEASE ORAL at 21:03

## 2018-02-06 RX ADMIN — AMPICILLIN SODIUM AND SULBACTAM SODIUM 3 G: 2; 1 INJECTION, POWDER, FOR SOLUTION INTRAMUSCULAR; INTRAVENOUS at 21:03

## 2018-02-06 RX ADMIN — INSULIN HUMAN 2 UNITS: 100 INJECTION, SOLUTION PARENTERAL at 06:43

## 2018-02-06 RX ADMIN — AMPICILLIN SODIUM AND SULBACTAM SODIUM 3 G: 2; 1 INJECTION, POWDER, FOR SOLUTION INTRAMUSCULAR; INTRAVENOUS at 05:28

## 2018-02-06 RX ADMIN — GUAIFENESIN 200 MG: 100 SOLUTION ORAL at 00:12

## 2018-02-06 RX ADMIN — INSULIN GLARGINE 18 UNITS: 100 INJECTION, SOLUTION SUBCUTANEOUS at 21:10

## 2018-02-06 RX ADMIN — GUAIFENESIN 200 MG: 100 SOLUTION ORAL at 16:41

## 2018-02-06 RX ADMIN — GUAIFENESIN 200 MG: 100 SOLUTION ORAL at 21:53

## 2018-02-06 RX ADMIN — POTASSIUM CHLORIDE 40 MEQ: 1500 TABLET, EXTENDED RELEASE ORAL at 08:57

## 2018-02-06 RX ADMIN — FLUCONAZOLE 400 MG: 100 TABLET ORAL at 16:41

## 2018-02-06 RX ADMIN — AMPICILLIN SODIUM AND SULBACTAM SODIUM 3 G: 2; 1 INJECTION, POWDER, FOR SOLUTION INTRAMUSCULAR; INTRAVENOUS at 15:25

## 2018-02-06 RX ADMIN — ENOXAPARIN SODIUM 40 MG: 100 INJECTION SUBCUTANEOUS at 08:57

## 2018-02-06 RX ADMIN — INSULIN HUMAN 3 UNITS: 100 INJECTION, SOLUTION PARENTERAL at 12:24

## 2018-02-06 ASSESSMENT — ENCOUNTER SYMPTOMS
COUGH: 1
VOMITING: 0
SPUTUM PRODUCTION: 1
SHORTNESS OF BREATH: 0
DIARRHEA: 0
NAUSEA: 0
HEMOPTYSIS: 0
WHEEZING: 0
COUGH: 0
SHORTNESS OF BREATH: 1
FEVER: 0
DIZZINESS: 0
ABDOMINAL PAIN: 0
WEAKNESS: 1
CHILLS: 0
HEADACHES: 0

## 2018-02-06 ASSESSMENT — COGNITIVE AND FUNCTIONAL STATUS - GENERAL
SUGGESTED CMS G CODE MODIFIER MOBILITY: CH
SUGGESTED CMS G CODE MODIFIER DAILY ACTIVITY: CH
MOBILITY SCORE: 24
DAILY ACTIVITIY SCORE: 24

## 2018-02-06 ASSESSMENT — PAIN SCALES - GENERAL
PAINLEVEL_OUTOF10: 3
PAINLEVEL_OUTOF10: 0
PAINLEVEL_OUTOF10: 2
PAINLEVEL_OUTOF10: 0
PAINLEVEL_OUTOF10: 3
PAINLEVEL_OUTOF10: 5
PAINLEVEL_OUTOF10: 0

## 2018-02-06 NOTE — DISCHARGE PLANNING
Medical Social Work    Called PCP office to follow up on status of order. Per Alexandria HOUSTON still hasn't reviewed records and MD is in surgery all day and will not be in the clinic today. Alexandria stated MD will return to office tomorrow, however pt hasn't been seen at the office since November 2016. Alexandria stated she's unsure if MD will be willing to write order due to this.     Called [facility] and spoke with Lakeshia. Lakeshia requested clinicals faxed to [facility]. Per Lakeshia, they accept orders from any MD's and have in the past accepted orders from [facility] MD's as long as they're willing to continue following pt.    Faxed H&P, ID note and PICC note to [phone].     SW to follow up with PCP office tomorrow

## 2018-02-06 NOTE — THERAPY
"Physical Therapy Evaluation completed.   Bed Mobility:  Supine to Sit: Supervised  Transfers: Sit to Stand: Supervised  Gait: Level Of Assist: Supervised with No Equipment Needed       Plan of Care: Patient with no further skilled PT needs in the acute care setting at this time  Discharge Recommendations: Equipment: No Equipment Needed. Post-acute therapy Currently anticipate no further skilled therapy needs once patient is discharged from the inpatient setting.    See \"Rehab Therapy-Acute\" Patient Summary Report for complete documentation.     "

## 2018-02-06 NOTE — PROGRESS NOTES
Infectious Disease Progress Note    Author: Stephanie Melara M.D. Date & Time of service: 2018  3:30 PM    Chief Complaint:  Cavitary lung lesions      Interval History:  57-year-old diabetic  admitted due to worsening cough, found to have CT with bilateral upper lobe   cavitary lesions   Temp 100.8, sputum +SA Feels much better today   AF WBC 10.9 feels better-awaiting bronch   AF, WBC 10.8, denies any SOB, had bronch yesterday, no abd pain or diarrhea   AF, no CBC, upset he did not get a breathing treatment last night, had cough fit overnight, on  LNC  2/3 AF, no CBC, on room air, breathing better with decreased sputum production   AF, no CBC, moved to a private room, continue to saturate well on RA, no new symptoms  18- MAXIMUM TEMPERATURE 99.4 wbc 10.8 platelets 257 and 0.55 cc feels much better   18- MAXIMUM TEMPERATURE 98.6 no new labs available since he feels much better breathing is much better  Labs Reviewed, Medications Reviewed and Radiology Reviewed.    Review of Systems:  Review of Systems   Constitutional: Negative for chills and fever.   Respiratory: Positive for sputum production. Negative for cough, hemoptysis, shortness of breath and wheezing.         Has only occasional cough now   Cardiovascular: Negative for chest pain.   Gastrointestinal: Negative for abdominal pain, diarrhea, nausea and vomiting.   All other systems reviewed and are negative.      Hemodynamics:  Temp (24hrs), Av.4 °C (97.5 °F), Min:35.6 °C (96.1 °F), Max:37 °C (98.6 °F)  Temperature: (!) 35.6 °C (96.1 °F)  Pulse  Av  Min: 65  Max: 119  Blood Pressure: 111/63       Physical Exam:  Physical Exam   Constitutional: He is oriented to person, place, and time. He appears well-developed. No distress.   HENT:   Head: Normocephalic and atraumatic.   Eyes: EOM are normal. Pupils are equal, round, and reactive to light.   Neck: Neck supple.   Cardiovascular: Normal rate.    No murmur  heard.  Pulmonary/Chest: Effort normal. No respiratory distress. He has no wheezes. He has rales.   Abdominal: Soft. He exhibits no distension. There is no tenderness. There is no rebound.   Musculoskeletal: He exhibits no edema.   Neurological: He is alert and oriented to person, place, and time.   Skin: No rash noted.   Nursing note and vitals reviewed.      Meds:    Current Facility-Administered Medications:   •  insulin glargine  •  PICC Line Insertion has been implemented **AND** May use Lidocaine 1% not to exceed 3 mls for local at insertion site **AND** NOTIFY MD **AND** Tip to dwell in the superior vena cava **AND** Do not use PICC Line until placement verified by Chest X Ray **AND** If radiologist reading of chest X-ray states any of the following the PICC should be used **AND** Further evaluation of the PICC placement can be retrieved from X-Ray and Imaging **AND** Blood draws through PICC line; draws by RN only **AND** FLUSHING GUIDELINES WHEN IN USE **AND** normal saline PF **AND** FLUSHING GUIDELINES WHEN NOT IN USE **AND** DRESSING MAINTENANCE **AND** Change needleless pressure ports and IV tubing every 72 hours per hospital policy **AND** TUBING **AND** If there is an MD order to remove the PICC line, any RN may remove the PICC line **AND** [] PATIENT EDUCATION MATERIALS **AND** NURSING COMMUNICATION  •  guaiFENesin LA  •  potassium chloride SA  •  enoxaparin (LOVENOX) injection  •  NS  •  ampicillin-sulbactam (UNASYN) IV  •  mag hydrox-al hydrox-simeth  •  ipratropium-albuterol  •  senna-docusate **AND** polyethylene glycol/lytes **AND** magnesium hydroxide **AND** bisacodyl  •  Respiratory Care per Protocol  •  acetaminophen  •  Notify provider if pain remains uncontrolled **AND** Use the numeric rating scale (NRS-11) on regular floors and Critical-Care Pain Observation Tool (CPOT) on ICUs/Trauma to assess pain **AND** Pulse Ox (Oximetry) **AND** Pharmacy Consult Request **AND** If patient  difficult to arouse and/or has respiratory depression, stop any opiates that are currently infusing and call a Rapid Response. **AND** oxyCODONE immediate-release **AND** oxyCODONE immediate-release **AND** morphine injection  •  insulin regular **AND** Accu-Chek ACHS **AND** NOTIFY MD and PharmD **AND** glucose 4 g **AND** dextrose 50%  •  ondansetron  •  ondansetron  •  promethazine  •  promethazine  •  prochlorperazine  •  zolpidem  •  ibuprofen  •  guaiFENesin  •  voriconazole    Labs:  No results for input(s): WBC, RBC, HEMOGLOBIN, HEMATOCRIT, MCV, MCH, RDW, PLATELETCT, MPV, NEUTSPOLYS, LYMPHOCYTES, MONOCYTES, EOSINOPHILS, BASOPHILS, RBCMORPHOLO in the last 72 hours.  Recent Labs      02/04/18   0209  02/05/18   0213   SODIUM  134*  135   POTASSIUM  4.0  4.6   CHLORIDE  104  105   CO2  23  24   GLUCOSE  137*  147*   BUN  5*  5*     Recent Labs      02/04/18   0209  02/05/18   0213   ALBUMIN  1.7*  1.8*   CREATININE  0.45*  0.55       Imaging:  No results found.    Micro:  Results     Procedure Component Value Units Date/Time    AFB CULTURE [[id number]] Collected:  01/31/18 1630    Order Status:  Completed Specimen:  Respirate from Bronchoalveolar Lavage Updated:  02/03/18 1217     Significant Indicator NEG     Source RESP     Site Bronchial Washings     AFB Culture Culture in progress.     AFB Smear Results No acid fast bacilli seen.    Narrative:       Collected By:[id number] GONDAL MUHAMMAD K  JEANE    CULTURE RESPIRATORY W/ GRM STN [[id number]]  (Abnormal) Collected:  01/31/18 1630    Order Status:  Completed Specimen:  Respirate from Bronchoalveolar Lavage Updated:  02/03/18 1217     Gram Stain Result --     Few WBCs.  Few Gram positive cocci.       Significant Indicator POS (POS)     Source RESP     Site Bronchial Washings     Respiratory Culture -- (A)     Growth noted after further incubation,see below for  organism identification.       Respiratory Culture -- (A)     Staphylococcus aureus  Light growth  See  previous culture for sensitivity report.      Narrative:       Collected By:[id number] GONDAL MUHAMMAD K  JEANE    FUNGAL CULTURE [[id number]] Collected:  01/31/18 1630    Order Status:  Completed Specimen:  Respirate from Bronchoalveolar Lavage Updated:  02/03/18 1217     Significant Indicator NEG     Source RESP     Site Bronchial Washings     Fungal Culture Culture in progress.    Narrative:       Collected By:[id number] GONDAL ESPARZA K  JEANE    AFB [[id number]] Collected:  01/31/18 1630    Order Status:  Completed Specimen:  Respirate from Bronchoalveolar Lavage Updated:  02/03/18 1216     Significant Indicator NEG     Source RESP     Site Bronchoalveolar Lavage RUL     AFB Culture Culture in progress.     AFB Smear Results No acid fast bacilli seen.    Narrative:       Collected By:[id number] GONDAL ESPARZA K  JEANE    FUNGAL CULTURE [[id number]] Collected:  01/31/18 1630    Order Status:  Completed Specimen:  Respirate from Bronchoalveolar Lavage Updated:  02/03/18 1216     Significant Indicator NEG     Source RESP     Site Bronchoalveolar Lavage RUL     Fungal Culture Culture in progress.    Narrative:       Collected By:[id number] GONDAL MUHAMMAD K  JEANE    QUANT BRONCHIAL WASHING [[id number]]  (Abnormal) Collected:  01/31/18 1630    Order Status:  Completed Specimen:  Respirate Updated:  02/03/18 1216     Gram Stain Result --     Rare WBCs.  Rare Gram positive cocci.  <5% intracellular organisms.       Significant Indicator POS (POS)     Source RESP     Site Bronchoalveolar Lavage RUL     Quantitative Bronch Washing -- (A)     Growth noted after further incubation,see below for  organism identification.       Quantitative Bronch Washing -- (A)     Staphylococcus aureus  10,000 cfu/mL  See previous culture for sensitivity report.      Narrative:       Collected By:[id number] GONDAL ESPARZA K  JEANE    FUNGAL CULTURE [[id number]] Collected:  01/31/18 1630    Order Status:  Completed Specimen:  Respirate from Bronchoalveolar Lavage  Updated:  02/03/18 1215     Significant Indicator NEG     Source RESP     Site Bronchoalveolar Lavage JEANE     Fungal Culture Culture in progress.    Narrative:       Collected By:[id number] GONDAL MUHAMMAD K  RUL  Collected By:[id number] GONDAL MUHAMMAD K  JEANE    AFB [[id number]] Collected:  01/31/18 1630    Order Status:  Completed Specimen:  Respirate from Bronchoalveolar Lavage Updated:  02/03/18 1215     Significant Indicator NEG     Source RESP     Site Bronchoalveolar Lavage JEANE     AFB Culture Culture in progress.     AFB Smear Results No acid fast bacilli seen.    Narrative:       Collected By:[id number] GONDAL MUHAMMAD K  RUL  Collected By:[id number] GONDAL MUHAMMAD K  JEANE    QUANT BRONCHIAL WASHING [[id number]]  (Abnormal)  (Susceptibility) Collected:  01/31/18 1630    Order Status:  Completed Specimen:  Respirate Updated:  02/03/18 1215     Gram Stain Result --     Many WBCs.  Moderate Gram positive cocci.  <5% intracellular organisms.       Significant Indicator POS (POS)     Source RESP     Site Bronchoalveolar Lavage JEANE     Quantitative Bronch Washing -- (A)     Growth noted after further incubation,see below for  organism identification.       Quantitative Bronch Washing -- (A)     Staphylococcus aureus  40,000 cfu/mL      Narrative:       Collected By:[id number] GONDAL MUHAMMAD K  RUL  Collected By:[id number] GONDAL MUHAMMAD K  JEANE    Culture & Susceptibility     STAPHYLOCOCCUS AUREUS     Antibiotic Sensitivity Microscan Unit Status    Ampicillin/sulbactam Sensitive <=8/4 mcg/mL Final    Clindamycin Sensitive <=0.5 mcg/mL Final    Erythromycin Sensitive <=0.5 mcg/mL Final    Moxifloxacin Sensitive <=0.5 mcg/mL Final    Oxacillin Sensitive <=0.25 mcg/mL Final    Tetracycline Sensitive <=4 mcg/mL Final    Trimeth/Sulfa Sensitive <=0.5/9.5 mcg/mL Final    Vancomycin Sensitive 2 mcg/mL Final                       BLOOD CULTURE [[id number]] Collected:  01/28/18 1908    Order Status:  Completed Specimen:  Blood from Peripheral  "Updated:  02/02/18 2100     Significant Indicator NEG     Source BLD     Site PERIPHERAL     Blood Culture No growth after 5 days of incubation.    Narrative:       Per Hospital Policy: Only change Specimen Src: to \"Line\" if  specified by physician order.    BLOOD CULTURE [[id number]] Collected:  01/28/18 1908    Order Status:  Completed Specimen:  Blood from Peripheral Updated:  02/02/18 2100     Significant Indicator NEG     Source BLD     Site PERIPHERAL     Blood Culture No growth after 5 days of incubation.    Narrative:       Per Hospital Policy: Only change Specimen Src: to \"Line\" if  specified by physician order.    AFB CULTURE [[id number]] Collected:  01/31/18 1630    Order Status:  Completed Specimen:  Respirate from Bronchoalveolar Lavage Updated:  02/02/18 1054     Significant Indicator NEG     Source RESP     Site Bronchial Brushings JEANE     AFB Culture Culture in progress.     AFB Smear Results No acid fast bacilli seen.    Narrative:       Collected By:[id number] GONDAL MUHAMMAD K  RUL    RESP CULTURE [[id number]] Collected:  01/31/18 1630    Order Status:  Completed Specimen:  Respirate from Bronchoalveolar Lavage Updated:  02/02/18 1054     Gram Stain Result No organisms seen.     Significant Indicator NEG     Source RESP     Site Bronchial Brushings JEANE     Respiratory Culture No growth at 48 hours.    Narrative:       Collected By:[id number] GONDAL MUHAMMAD K  RUL    FUNGAL CULTURE [[id number]] Collected:  01/31/18 1630    Order Status:  Completed Specimen:  Respirate from Bronchoalveolar Lavage Updated:  02/02/18 1054     Significant Indicator NEG     Source RESP     Site Bronchial Brushings JEANE     Fungal Culture Culture in progress.    Narrative:       Collected By:[id number] GONDAL MUHAMMAD K  RUL    GRAM STAIN [[id number]] Collected:  01/31/18 1630    Order Status:  Completed Specimen:  Respirate Updated:  02/01/18 1729     Significant Indicator .     Source RESP     Site Bronchial Brushings JEANE     Gram " Stain Result No organisms seen.    Narrative:       Collected By:[id number] GONDAL ESPARZA K  RUL    ACID FAST STAIN [[id number]] Collected:  01/31/18 1630    Order Status:  Completed Specimen:  Respirate Updated:  02/01/18 1729     Significant Indicator NEG     Source RESP     Site Bronchial Brushings JEANE     AFB Smear Results No acid fast bacilli seen.    Narrative:       Collected By:[id number] GONDAL ESPARZA K  RUL    GRAM STAIN [[id number]] Collected:  01/31/18 1630    Order Status:  Completed Specimen:  Respirate Updated:  02/01/18 1729     Significant Indicator .     Source RESP     Site Bronchial Washings     Gram Stain Result --     Few WBCs.  Few Gram positive cocci.      Narrative:       Collected By:[id number] GONDAL ESPARZA K  JEANE    ACID FAST STAIN [[id number]] Collected:  01/31/18 1630    Order Status:  Completed Specimen:  Respirate Updated:  02/01/18 1729     Significant Indicator NEG     Source RESP     Site Bronchial Washings     AFB Smear Results No acid fast bacilli seen.    Narrative:       Collected By:[id number] GONDAL ESPARZA K  JEANE    GRAM STAIN [[id number]] Collected:  01/31/18 1630    Order Status:  Completed Specimen:  Respirate Updated:  02/01/18 1729     Significant Indicator .     Source RESP     Site Bronchoalveolar Lavage RUL     Gram Stain Result --     Rare WBCs.  Rare Gram positive cocci.  <5% intracellular organisms.      Narrative:       Collected By:[id number] GONDAL ESPARZA K  JEANE    ACID FAST STAIN [[id number]] Collected:  01/31/18 1630    Order Status:  Completed Specimen:  Respirate Updated:  02/01/18 1729     Significant Indicator NEG     Source RESP     Site Bronchoalveolar Lavage RUL     AFB Smear Results No acid fast bacilli seen.    Narrative:       Collected By:[id number] GONDAL ESPARZA K  JEANE    GRAM STAIN [[id number]] Collected:  01/31/18 1630    Order Status:  Completed Specimen:  Respirate Updated:  02/01/18 1729     Significant Indicator .     Source RESP     Site Bronchoalveolar  Lavage JEANE     Gram Stain Result --     Many WBCs.  Moderate Gram positive cocci.  <5% intracellular organisms.      Narrative:       Collected By:[id number] GONDAL MUHAMMAD K  RUL  Collected By:[id number] GONDAL MUHAMMAD K  JEANE    ACID FAST STAIN [[id number]] Collected:  01/31/18 1630    Order Status:  Completed Specimen:  Respirate Updated:  02/01/18 1729     Significant Indicator NEG     Source RESP     Site Bronchoalveolar Lavage JEANE     AFB Smear Results No acid fast bacilli seen.    Narrative:       Collected By:[id number] GONDAL MUHAMMAD K  RUL  Collected By:[id number] GONDAL MUHAMMAD K  JEANE    CULTURE RESPIRATORY W/ [facility] STN [[id number]] Collected:  01/31/18 1630    Order Status:  Canceled Specimen:  Other from Bronchoalveolar Lavage     CULTURE RESPIRATORY W/ [facility] STN [[id number]] Collected:  01/31/18 1630    Order Status:  Canceled Specimen:  Other from Bronchoalveolar Lavage     Culture Respiratory W/ [facility] [[id number]]  (Abnormal)  (Susceptibility) Collected:  01/29/18 0230    Order Status:  Completed Specimen:  Respirate from Sputum Updated:  01/31/18 0746     Gram Stain Result --     Many WBCs.  Few epithelial cells.  Many Gram positive cocci.  Specimen Quality Score: 2+       Significant Indicator POS (POS)     Source RESP     Site SPUTUM     Respiratory Culture -- (A)     Respiratory Culture -- (A)     Staphylococcus aureus  Heavy growth      Narrative:       Collected By:ANTONIA Thompson before first antibiotic dose - add Gram stain if  indicated    Culture & Susceptibility     STAPHYLOCOCCUS AUREUS     Antibiotic Sensitivity Microscan Unit Status    Ampicillin/sulbactam Sensitive <=8/4 mcg/mL Final    Clindamycin Sensitive <=0.5 mcg/mL Final    Daptomycin Sensitive 1 mcg/mL Final    Erythromycin Sensitive <=0.5 mcg/mL Final    Moxifloxacin Sensitive <=0.5 mcg/mL Final    Oxacillin Sensitive <=0.25 mcg/mL Final    Tetracycline Sensitive <=4 mcg/mL Final    Trimeth/Sulfa Sensitive <=0.5/9.5 mcg/mL  Final    Vancomycin Sensitive 2 mcg/mL Final                             Assessment:  Active Hospital Problems    Diagnosis   • *Pneumonia [J18.9]   • Cavitary lesion of lung [J98.4]   • Hyponatremia [E87.1]   • Hypokalemia [E87.6]   • Hypocalcemia [E83.51]   • Hypoalbuminemia [E88.09]   • Normocytic anemia [D64.9]   • Type II diabetes mellitus (CMS-HCC) [E11.9]       Plan:  Cavitary lung lesions  Low grade fever resolved  No leukocytosis  Blood cxs neg  HIV neg  Sputum +MSSA  S/p Bronch with BAL on 1/31  BAL gram stain +GPC, Cx - MSSA  AFB stains neg  Continue Unasyn and vori  Cocci serology +IgG, -IgM-change voriconazole to Diflucan high dose  PICC ordered  Can do ertapenem 1 g daily  [facility] report pending  Check sedimentation rate and CRP      Diabetes, poorly controlled  Keep BS under 150 to help control current infection  Hgb A1C 12.5    If pt goes to SNF, pt can remain on unasyn. However, he lives in [place] and prefers to go to [facility]. His PCP will need to take over abx orders for ertapenem if pt goes home.    Discussed with internal medicine

## 2018-02-06 NOTE — PROGRESS NOTES
Pulmonary Critical Care Progress Note        Chief Complaint: Inpatient follow up necrotizing pneumonia adilson    History of Present Illness: 57 y.o. male with history of DM type 2 presented to the ER as a transfer from [place]. He had symptoms of productive cough for 02 weeks duration accompanied by fever and sweating off and on. Expectoration is greenish brown. No hemoptysis.   Patient is a  and reports being exposed to sick contacts on a regular basis. He also has environmental exposure to dust, smoke and chemical toxins. He reports history of pneumonia twice previously. CT chest shows bilateral upper lobe consolidation with necrotic cavitary lesion and bilateral lower lobe patchy densities. Currently being treated empirically with antibiotics and antifungals. He hypokalemia and hyponatremia and hyperglycemia.    ROS: Constitutional: Fever off and on, with sweating, generalized weakness: Respiratory: Dyspnea, productive cough, no pleuritic chest pain. No wheeze, Cardiac: Denies palpitations, angina, orthopnea or paroxysmal nocturnal dyspnea , GI: No nausea, vomiting abdominal pain.  All other systems negative. Neuro: Denies headache, dizziness or seizures. Gu: No hematuria or dysuria, Musculoskeletal: No joint inflammation or arthralgia.    Interval Events:  1/30/18: Hyperglycemia has improved. Frequency of cough decreased with copious expectoration. Hydration status has improved. Normal appetite. No dysuria    1/31/17: No new complaints, however hypokalemia has worsened but hyponatremia has improved. Patient is NPO for bronchoscopy today. Will replace potassium and then proceed with bronchoscopy. Blood glucose levels improved. Afebrile. Has productive cough but no chest pain.    2/1/18: Bronchoscopy with BAL, brushings, TBBx  RUL,JEANE was performed yesterday. Mucopurulent secretions from bot upper lobes was removed. Patient was kept in telemetry overnight. He is comfortable today morning. Denies chest  pain. Has mild cough. No hemoptysis. No wheeze. Afebrile. ID splt has changed vancomycin to Unasyn. Had hypokalemia for which potassium supplementation was done prior to bronchoscopy. Hyperglycemia uncontrolled.    2/2/18: Patient feels much better clinically. Denies chest pain , afebrile. No hemoptysis.     2/3/18: No new change in last 24 hours. Hypokalemia is resolved. Patient remains afebrile. Has productive cough. No AFB or fungal elements seen on BAL, or brushing and biopsy specimens. No malignant cells seen. On Unasyn for staphylococcal necrotizing pneumonia and empirically on voriconazole for possible fungal pneumonia with cavitations.    2/4/18: Patient admits improvement in clinical status. Afebrile. No chest pain or hemoptysis. SpO2 improved and now not on O2 supplementation.  Appetite is good. No skin rash, joint pain.     2/5/18: Fever resolved. No chest pain. Still has productive cough but no hemoptysis. Scheduled for PICC line placement and possible JOI today.  Off O2 supplementation    2/6/18: No new development. Had cough and dyspnea overnight. PICC line placed yesterday. JOI performed. Results pending. Patient is afebrile.    PFSH:  No change.    Respiratory:    Pulse oximetry 94% on room air  Exam: Not in respiratory distress  Accessory muscles of respiration are not prominent. No cervical lymphadenopathy  Bilateral breath sounds audible with no wheeze or crepitations. No pleural rub.  Imaging: CT chest personally reviewed.  Bilateral upper lobe cavitary lesions thick walled with surrounding consolidation.  RML,RLL and LLL patchy infiltrates. No effusion    HemoDynamics:  Pulse: 75  Blood Pressure: 116/70      Exam: No JVD, no carotid bruit  First and second heart sounds audible. No murmur or pericardial rub  No dependent edema    Neuro:  GCS 15    Exam: Alert and orientated to time place and person.   Tendon jerks intact. No focal neurological deficit    Fluids:  Intake/Output       02/04/18 0700  - 18 0659 18 0700 - 18 0659 18 07 - 18 0659      1900-0659 Total  Total 1900-0659 Total       Intake    P.O.  480  300 780  600  -- 600  --  -- --    P.O. 480 300 780 600 -- 600 -- -- --    I.V.  100  -- 100  --  -- --  --  -- --    IV Piggyback Volume 100 -- 100 -- -- -- -- -- --    Total Intake 580 300 880 600 -- 600 -- -- --       Output    Urine  550  1160 1710  600  1100 1700  --  -- --    Number of Times Voided 3 x -- 3 x -- -- -- -- -- --    Void (ml) 550 1160 4512 453 4388 1700 -- -- --    Total Output 550 1160 5301 969 1321 1700 -- -- --       Net I/O     30 -860 -830 0 -1100 -1100 -- -- --        Weight: 92.9 kg (204 lb 12.9 oz)  Recent Labs      18   0209  18   0213   SODIUM  134*  135   POTASSIUM  4.0  4.6   CHLORIDE  104  105   CO2  23  24   BUN  5*  5*   CREATININE  0.45*  0.55   PHOSPHORUS  2.8  3.3   CALCIUM  7.3*  7.5*       GI/Nutrition:  Exam: Oral mucosa is moist. No oral thrush   Abdomen is soft, non tender. Bowel sounds audible.    Liver Function  Recent Labs      18   0209  18   0213   GLUCOSE  137*  147*       Heme:  No results for input(s): RBC, HEMOGLOBIN, HEMATOCRIT, PLATELETCT, PROTHROMBTM, APTT, INR, IRON, FERRITIN, TOTIRONBC in the last 72 hours.    Infectious Disease:  Temp  Av.6 °C (97.9 °F)  Min: 35.7 °C (96.3 °F)  Max: 37 °C (98.6 °F)  Micro: Blood cultures no growth   Sputum Gram stain: Many WBCs and Gram positive cocci  Sputum culture: Staphylococcus sensitive to unasyn  BAL, Brushing and TBBx: No fungal elements, No AFB. No malignant cells seen  Fungal serology: Positive for coccidioides IgG. Neg IgM      Current Facility-Administered Medications   Medication Dose Frequency Provider Last Rate Last Dose   • normal saline PF 10-20 mL  10-20 mL PRN Dee Hampton M.D.       • guaiFENesin LA (MUCINEX) tablet 600 mg  600 mg Q12HRS Tyson Bianchi M.D.   600 mg at 18   •  insulin glargine (LANTUS) injection 14 Units  14 Units Q EVENING Tyson Bianchi M.D.   14 Units at 02/05/18 2040   • potassium chloride SA (Kdur) tablet 40 mEq  40 mEq BID Tyson Bianchi M.D.   40 mEq at 02/05/18 2030   • enoxaparin (LOVENOX) inj 40 mg  40 mg DAILY Muhammad K Gondal, M.D.   40 mg at 02/05/18 0841   • NS infusion   Continuous Muhammad K Gondal, M.D. 10 mL/hr at 02/03/18 2037     • ampicillin/sulbactam (UNASYN) 3 g in  mL IVPB  3 g Q6HRS Re Bermudez M.D.   Stopped at 02/06/18 0558   • mag hydrox-al hydrox-simeth (MAALOX PLUS ES or MYLANTA DS) suspension 10 mL  10 mL 4X/DAY PRN Lito Giraldo M.D.   10 mL at 01/29/18 1251   • ipratropium-albuterol (DUONEB) nebulizer solution 3 mL  3 mL Q4H PRN (RT) Lito Giraldo M.D.   3 mL at 02/05/18 2359   • senna-docusate (PERICOLACE or SENOKOT S) 8.6-50 MG per tablet 2 Tab  2 Tab BID Brandon Mota M.D.   2 Tab at 01/31/18 0811    And   • polyethylene glycol/lytes (MIRALAX) PACKET 1 Packet  1 Packet QDAY PRN Brandon Mota M.D.        And   • magnesium hydroxide (MILK OF MAGNESIA) suspension 30 mL  30 mL QDAY PRN Brandon Mota M.D.        And   • bisacodyl (DULCOLAX) suppository 10 mg  10 mg QDAY PRN Brandon Mota M.D.       • Respiratory Care per Protocol   Continuous RT Brandon Mota M.D.       • acetaminophen (TYLENOL) tablet 650 mg  650 mg Q6HRS PRN Brandon Mota M.D.       • Pharmacy Consult Request ...Pain Management Review   PRN Brandon Mota M.D.        And   • oxycodone immediate-release (ROXICODONE) tablet 2.5 mg  2.5 mg Q3HRS PRLULU Mota M.D.   Stopped at 02/01/18 1535    And   • oxycodone immediate-release (ROXICODONE) tablet 5 mg  5 mg Q3HRS PRLULU Mota M.D.   5 mg at 02/04/18 2133    And   • morphine (pf) 4 mg/ml injection 2 mg  2 mg Q3HRS PRLULU Mota M.D.       • insulin regular (HUMULIN R) injection 2-9 Units  2-9 Units 4X/DAY LANI Taylor  DENY Mota   2 Units at 02/06/18 0643    And   • glucose 4 g chewable tablet 16 g  16 g Q15 MIN PRN Brandon Mota M.D.        And   • dextrose 50% (D50W) injection 25 mL  25 mL Q15 MIN PRN Brandon Mota M.D.       • ondansetron (ZOFRAN) syringe/vial injection 4 mg  4 mg Q4HRS PRN Brandon Mota M.D.       • ondansetron (ZOFRAN ODT) dispertab 4 mg  4 mg Q4HRS PRN Brandon Mota M.D.       • promethazine (PHENERGAN) tablet 12.5-25 mg  12.5-25 mg Q4HRS PRLULU Mota M.D.       • promethazine (PHENERGAN) suppository 12.5-25 mg  12.5-25 mg Q4HRS PRN Brandon Mota M.D.       • prochlorperazine (COMPAZINE) injection 5-10 mg  5-10 mg Q4HRS PRN Brandon Mota M.D.       • zolpidem (AMBIEN) tablet 5 mg  5 mg HS PRN - MR X 1 Brandon Mota M.D.   5 mg at 02/04/18 2322   • ibuprofen (MOTRIN) tablet 400 mg  400 mg Q6HRS PRN Brandon Mota M.D.   400 mg at 02/06/18 0531   • guaiFENesin (ROBITUSSIN) 100 MG/5ML solution 200 mg  10 mL Q4HRS PRN Brandon Mota M.D.   200 mg at 02/06/18 0534   • voriconazole (VFEND) tablet 200 mg  200 mg Q12HRS Donald Ga M.D.   200 mg at 02/05/18 2030     Last reviewed on 1/31/2018  3:30 PM by Evonne Trent R.N.    Quality  Measures:   Labs reviewed, Medications reviewed and Radiology images reviewed   Mei catheter: No Mei       DVT Prophylaxis: Enoxaparin (Lovenox)          Problems:  Necrotizing pneumonia bilateral upper lobes/ Lung abscess. S/p bronchoscopy  Diabetes Mellitus type 2 controlled  Hypokalemia / hyponatremia resolved.      Plan:  Continue Voriconazole and Unasyn  Incentive spirometry, Flutter valve  Fungal cultures pending will follow  Continue bronchodilators    Patient advised to avoid exertion while at home and not to join work till cleared by Pulmonologist .

## 2018-02-06 NOTE — PROGRESS NOTES
Pt coughing and c/o SOB, copious amounts of thick brown sputum. Pt placed on oxygen via NC 2lpm. RT called for breathing tx per pt request.

## 2018-02-06 NOTE — DISCHARGE PLANNING
Medical Social Work    Called PCP office to follow up. Spoke with Alexandria who stated MD still hasn't reviewed order but anticipate he will review later today.     Alexandria requested SW call [phone] tomorrow to follow up on status.

## 2018-02-06 NOTE — PROGRESS NOTES
Infectious Disease Progress Note    Author: Stephanie Melara M.D. Date & Time of service: 2018  5:00 PM    Chief Complaint:  Cavitary lung lesions      Interval History:  57-year-old diabetic  admitted due to worsening cough, found to have CT with bilateral upper lobe   cavitary lesions   Temp 100.8, sputum +SA Feels much better today   AF WBC 10.9 feels better-awaiting bronch   AF, WBC 10.8, denies any SOB, had bronch yesterday, no abd pain or diarrhea   AF, no CBC, upset he did not get a breathing treatment last night, had cough fit overnight, on 1 LNC  2/3 AF, no CBC, on room air, breathing better with decreased sputum production   AF, no CBC, moved to a private room, continue to saturate well on RA, no new symptoms  18- MAXIMUM TEMPERATURE 99.4 wbc 10.8 platelets 257 and 0.55 cc feels much better  Labs Reviewed, Medications Reviewed and Radiology Reviewed.    Review of Systems:  Review of Systems   Constitutional: Negative for chills and fever.   Respiratory: Positive for cough and sputum production. Negative for hemoptysis, shortness of breath and wheezing.         Decreased. Has some yellowish sputum   Cardiovascular: Negative for chest pain.   Gastrointestinal: Negative for abdominal pain, diarrhea, nausea and vomiting.   All other systems reviewed and are negative.      Hemodynamics:  Temp (24hrs), Av °C (98.6 °F), Min:36.8 °C (98.2 °F), Max:37.4 °C (99.4 °F)  Temperature: 36.8 °C (98.2 °F)  Pulse  Av.2  Min: 65  Max: 119  Blood Pressure: 133/76       Physical Exam:  Physical Exam   Constitutional: He is oriented to person, place, and time. He appears well-developed. No distress.   HENT:   Head: Normocephalic and atraumatic.   Eyes: EOM are normal. Pupils are equal, round, and reactive to light.   Neck: Neck supple.   Cardiovascular: Normal rate.    No murmur heard.  Pulmonary/Chest: Effort normal. No respiratory distress. He has no wheezes. He has rales.    Abdominal: Soft. He exhibits no distension. There is no tenderness. There is no rebound.   Musculoskeletal: He exhibits no edema.   Neurological: He is alert and oriented to person, place, and time.   Skin: No rash noted.   Nursing note and vitals reviewed.      Meds:    Current Facility-Administered Medications:   •  PICC Line Insertion has been implemented **AND** May use Lidocaine 1% not to exceed 3 mls for local at insertion site **AND** NOTIFY MD **AND** Tip to dwell in the superior vena cava **AND** Do not use PICC Line until placement verified by Chest X Ray **AND** If radiologist reading of chest X-ray states any of the following the PICC should be used **AND** Further evaluation of the PICC placement can be retrieved from X-Ray and Imaging **AND** Blood draws through PICC line; draws by RN only **AND** FLUSHING GUIDELINES WHEN IN USE **AND** normal saline PF **AND** FLUSHING GUIDELINES WHEN NOT IN USE **AND** DRESSING MAINTENANCE **AND** Change needleless pressure ports and IV tubing every 72 hours per hospital policy **AND** TUBING **AND** If there is an MD order to remove the PICC line, any RN may remove the PICC line **AND** [] PATIENT EDUCATION MATERIALS **AND** NURSING COMMUNICATION  •  guaiFENesin LA  •  insulin glargine  •  potassium chloride SA  •  enoxaparin (LOVENOX) injection  •  NS  •  ampicillin-sulbactam (UNASYN) IV  •  mag hydrox-al hydrox-simeth  •  ipratropium-albuterol  •  senna-docusate **AND** polyethylene glycol/lytes **AND** magnesium hydroxide **AND** bisacodyl  •  Respiratory Care per Protocol  •  acetaminophen  •  Notify provider if pain remains uncontrolled **AND** Use the numeric rating scale (NRS-11) on regular floors and Critical-Care Pain Observation Tool (CPOT) on ICUs/Trauma to assess pain **AND** Pulse Ox (Oximetry) **AND** Pharmacy Consult Request **AND** If patient difficult to arouse and/or has respiratory depression, stop any opiates that are currently infusing  and call a Rapid Response. **AND** oxyCODONE immediate-release **AND** oxyCODONE immediate-release **AND** morphine injection  •  insulin regular **AND** Accu-Chek ACHS **AND** NOTIFY MD and PharmD **AND** glucose 4 g **AND** dextrose 50%  •  ondansetron  •  ondansetron  •  promethazine  •  promethazine  •  prochlorperazine  •  zolpidem  •  ibuprofen  •  guaiFENesin  •  voriconazole    Labs:  No results for input(s): WBC, RBC, HEMOGLOBIN, HEMATOCRIT, MCV, MCH, RDW, PLATELETCT, MPV, NEUTSPOLYS, LYMPHOCYTES, MONOCYTES, EOSINOPHILS, BASOPHILS, RBCMORPHOLO in the last 72 hours.  Recent Labs      02/03/18   0305  02/04/18   0209  02/05/18   0213   SODIUM  134*  134*  135   POTASSIUM  3.8  4.0  4.6   CHLORIDE  102  104  105   CO2  23  23  24   GLUCOSE  131*  137*  147*   BUN  6*  5*  5*     Recent Labs      02/03/18   0305  02/04/18   0209  02/05/18   0213   ALBUMIN  2.2*  1.7*  1.8*   CREATININE  0.63  0.45*  0.55       Imaging:  No results found.    Micro:  Results     Procedure Component Value Units Date/Time    AFB CULTURE [[id number]] Collected:  01/31/18 1630    Order Status:  Completed Specimen:  Respirate from Bronchoalveolar Lavage Updated:  02/03/18 1217     Significant Indicator NEG     Source RESP     Site Bronchial Washings     AFB Culture Culture in progress.     AFB Smear Results No acid fast bacilli seen.    Narrative:       Collected By:[id number] GONDAL MUHAMMAD K  JEANE    CULTURE RESPIRATORY W/ GRM STN [[id number]]  (Abnormal) Collected:  01/31/18 1630    Order Status:  Completed Specimen:  Respirate from Bronchoalveolar Lavage Updated:  02/03/18 1217     Gram Stain Result --     Few WBCs.  Few Gram positive cocci.       Significant Indicator POS (POS)     Source RESP     Site Bronchial Washings     Respiratory Culture -- (A)     Growth noted after further incubation,see below for  organism identification.       Respiratory Culture -- (A)     Staphylococcus aureus  Light growth  See previous culture for  sensitivity report.      Narrative:       Collected By:[id number] GONDAL MUHAMMAD K  JEANE    FUNGAL CULTURE [[id number]] Collected:  01/31/18 1630    Order Status:  Completed Specimen:  Respirate from Bronchoalveolar Lavage Updated:  02/03/18 1217     Significant Indicator NEG     Source RESP     Site Bronchial Washings     Fungal Culture Culture in progress.    Narrative:       Collected By:[id number] GONDAL MUHAMMAD K  JEANE    AFB [[id number]] Collected:  01/31/18 1630    Order Status:  Completed Specimen:  Respirate from Bronchoalveolar Lavage Updated:  02/03/18 1216     Significant Indicator NEG     Source RESP     Site Bronchoalveolar Lavage RUL     AFB Culture Culture in progress.     AFB Smear Results No acid fast bacilli seen.    Narrative:       Collected By:[id number] GONDJASPAL DURÁNMAD K  JEANE    FUNGAL CULTURE [[id number]] Collected:  01/31/18 1630    Order Status:  Completed Specimen:  Respirate from Bronchoalveolar Lavage Updated:  02/03/18 1216     Significant Indicator NEG     Source RESP     Site Bronchoalveolar Lavage RUL     Fungal Culture Culture in progress.    Narrative:       Collected By:[id number] GONDAL MUHAMMAD K  JEANE    QUANT BRONCHIAL WASHING [[id number]]  (Abnormal) Collected:  01/31/18 1630    Order Status:  Completed Specimen:  Respirate Updated:  02/03/18 1216     Gram Stain Result --     Rare WBCs.  Rare Gram positive cocci.  <5% intracellular organisms.       Significant Indicator POS (POS)     Source RESP     Site Bronchoalveolar Lavage RUL     Quantitative Bronch Washing -- (A)     Growth noted after further incubation,see below for  organism identification.       Quantitative Bronch Washing -- (A)     Staphylococcus aureus  10,000 cfu/mL  See previous culture for sensitivity report.      Narrative:       Collected By:[id number] GONDAL MUHAMMAD K  JEANE    FUNGAL CULTURE [[id number]] Collected:  01/31/18 1630    Order Status:  Completed Specimen:  Respirate from Bronchoalveolar Lavage Updated:  02/03/18 1215      Significant Indicator NEG     Source RESP     Site Bronchoalveolar Lavage JEANE     Fungal Culture Culture in progress.    Narrative:       Collected By:[id number] VELIAAL ESPARZA [employer]  RUL  Collected By:[id number] VELIAAL ESPARZA [employer]  JEANE    AFB [[id number]] Collected:  01/31/18 1630    Order Status:  Completed Specimen:  Respirate from Bronchoalveolar Lavage Updated:  02/03/18 1215     Significant Indicator NEG     Source RESP     Site Bronchoalveolar Lavage JEANE     AFB Culture Culture in progress.     AFB Smear Results No acid fast bacilli seen.    Narrative:       Collected By:[id number] VELIANBD [employer] IncCRISTÓBAL TRIVEDI  RUL  Collected By:[id number] GONDAL MUHAMMAD [employer]  JEANE    QUANT BRONCHIAL WASHING [[id number]]  (Abnormal)  (Susceptibility) Collected:  01/31/18 1630    Order Status:  Completed Specimen:  Respirate Updated:  02/03/18 1215     Gram Stain Result --     Many WBCs.  Moderate Gram positive cocci.  <5% intracellular organisms.       Significant Indicator POS (POS)     Source RESP     Site Bronchoalveolar Lavage JEANE     Quantitative Bronch Washing -- (A)     Growth noted after further incubation,see below for  organism identification.       Quantitative Bronch Washing -- (A)     Staphylococcus aureus  40,000 cfu/mL      Narrative:       Collected By:[id number] GONDAL MUHAMMAD K  RUL  Collected By:[id number] GONDAL MUHAMMAD [employer]  JEANE    Culture & Susceptibility     STAPHYLOCOCCUS AUREUS     Antibiotic Sensitivity Microscan Unit Status    Ampicillin/sulbactam Sensitive <=8/4 mcg/mL Final    Clindamycin Sensitive <=0.5 mcg/mL Final    Erythromycin Sensitive <=0.5 mcg/mL Final    Moxifloxacin Sensitive <=0.5 mcg/mL Final    Oxacillin Sensitive <=0.25 mcg/mL Final    Tetracycline Sensitive <=4 mcg/mL Final    Trimeth/Sulfa Sensitive <=0.5/9.5 mcg/mL Final    Vancomycin Sensitive 2 mcg/mL Final                       BLOOD CULTURE [[id number]] Collected:  01/28/18 1908    Order Status:  Completed Specimen:  Blood from Peripheral Updated:  02/02/18 2100      "Significant Indicator NEG     Source BLD     Site PERIPHERAL     Blood Culture No growth after 5 days of incubation.    Narrative:       Per Hospital Policy: Only change Specimen Src: to \"Line\" if  specified by physician order.    BLOOD CULTURE [[id number]] Collected:  01/28/18 1908    Order Status:  Completed Specimen:  Blood from Peripheral Updated:  02/02/18 2100     Significant Indicator NEG     Source BLD     Site PERIPHERAL     Blood Culture No growth after 5 days of incubation.    Narrative:       Per Hospital Policy: Only change Specimen Src: to \"Line\" if  specified by physician order.    AFB CULTURE [[id number]] Collected:  01/31/18 1630    Order Status:  Completed Specimen:  Respirate from Bronchoalveolar Lavage Updated:  02/02/18 1054     Significant Indicator NEG     Source RESP     Site Bronchial Brushings JEANE     AFB Culture Culture in progress.     AFB Smear Results No acid fast bacilli seen.    Narrative:       Collected By:[id number] GONDAL MUHAMMAD K  RUL    RESP CULTURE [[id number]] Collected:  01/31/18 1630    Order Status:  Completed Specimen:  Respirate from Bronchoalveolar Lavage Updated:  02/02/18 1054     Gram Stain Result No organisms seen.     Significant Indicator NEG     Source RESP     Site Bronchial Brushings JEANE     Respiratory Culture No growth at 48 hours.    Narrative:       Collected By:[id number] GONDAL MUHAMMAD K  RUL    FUNGAL CULTURE [[id number]] Collected:  01/31/18 1630    Order Status:  Completed Specimen:  Respirate from Bronchoalveolar Lavage Updated:  02/02/18 1054     Significant Indicator NEG     Source RESP     Site Bronchial Brushings JEANE     Fungal Culture Culture in progress.    Narrative:       Collected By:[id number] GONDAL MUHAMMAD K  RUL    GRAM STAIN [[id number]] Collected:  01/31/18 1630    Order Status:  Completed Specimen:  Respirate Updated:  02/01/18 1729     Significant Indicator .     Source RESP     Site Bronchial Brushings JEANE     Gram Stain Result No organisms seen. "    Narrative:       Collected By:[id number] GONDAL ESPARZA K  RUL    ACID FAST STAIN [[id number]] Collected:  01/31/18 1630    Order Status:  Completed Specimen:  Respirate Updated:  02/01/18 1729     Significant Indicator NEG     Source RESP     Site Bronchial Brushings JEANE     AFB Smear Results No acid fast bacilli seen.    Narrative:       Collected By:[id number] GONDAL ESPARZA K  RUL    GRAM STAIN [[id number]] Collected:  01/31/18 1630    Order Status:  Completed Specimen:  Respirate Updated:  02/01/18 1729     Significant Indicator .     Source RESP     Site Bronchial Washings     Gram Stain Result --     Few WBCs.  Few Gram positive cocci.      Narrative:       Collected By:[id number] GONDAL ESPARZA K  JEANE    ACID FAST STAIN [[id number]] Collected:  01/31/18 1630    Order Status:  Completed Specimen:  Respirate Updated:  02/01/18 1729     Significant Indicator NEG     Source RESP     Site Bronchial Washings     AFB Smear Results No acid fast bacilli seen.    Narrative:       Collected By:[id number] GONDAL ESPARZA K  JEANE    GRAM STAIN [[id number]] Collected:  01/31/18 1630    Order Status:  Completed Specimen:  Respirate Updated:  02/01/18 1729     Significant Indicator .     Source RESP     Site Bronchoalveolar Lavage RUL     Gram Stain Result --     Rare WBCs.  Rare Gram positive cocci.  <5% intracellular organisms.      Narrative:       Collected By:[id number] GONDAL ESPARZA K  JEANE    ACID FAST STAIN [[id number]] Collected:  01/31/18 1630    Order Status:  Completed Specimen:  Respirate Updated:  02/01/18 1729     Significant Indicator NEG     Source RESP     Site Bronchoalveolar Lavage RUL     AFB Smear Results No acid fast bacilli seen.    Narrative:       Collected By:[id number] GONDAL ESPARZA K  JEANE    GRAM STAIN [[id number]] Collected:  01/31/18 1630    Order Status:  Completed Specimen:  Respirate Updated:  02/01/18 1729     Significant Indicator .     Source RESP     Site Bronchoalveolar Lavage JEANE     Gram Stain Result --      Many WBCs.  Moderate Gram positive cocci.  <5% intracellular organisms.      Narrative:       Collected By:[id number] GONDAL MUHAMMAD K  RUL  Collected By:[id number] GONDAL MUHAMMAD K  JEANE    ACID FAST STAIN [[id number]] Collected:  01/31/18 1630    Order Status:  Completed Specimen:  Respirate Updated:  02/01/18 1729     Significant Indicator NEG     Source RESP     Site Bronchoalveolar Lavage JEANE     AFB Smear Results No acid fast bacilli seen.    Narrative:       Collected By:[id number] GONDAL MUHAMMAD K  RUL  Collected By:[id number] GONDAL MUHAMMAD K  JEANE    CULTURE RESPIRATORY W/ GR STN [[id number]] Collected:  01/31/18 1630    Order Status:  Canceled Specimen:  Other from Bronchoalveolar Lavage     CULTURE RESPIRATORY W/ M STN [[id number]] Collected:  01/31/18 1630    Order Status:  Canceled Specimen:  Other from Bronchoalveolar Lavage     Culture Respiratory W/ GRM STN [[id number]]  (Abnormal)  (Susceptibility) Collected:  01/29/18 0230    Order Status:  Completed Specimen:  Respirate from Sputum Updated:  01/31/18 0746     Gram Stain Result --     Many WBCs.  Few epithelial cells.  Many Gram positive cocci.  Specimen Quality Score: 2+       Significant Indicator POS (POS)     Source RESP     Site SPUTUM     Respiratory Culture -- (A)     Respiratory Culture -- (A)     Staphylococcus aureus  Heavy growth      Narrative:       Collected By:ANTONIA Thompson before first antibiotic dose - add Gram stain if  indicated    Culture & Susceptibility     STAPHYLOCOCCUS AUREUS     Antibiotic Sensitivity Microscan Unit Status    Ampicillin/sulbactam Sensitive <=8/4 mcg/mL Final    Clindamycin Sensitive <=0.5 mcg/mL Final    Daptomycin Sensitive 1 mcg/mL Final    Erythromycin Sensitive <=0.5 mcg/mL Final    Moxifloxacin Sensitive <=0.5 mcg/mL Final    Oxacillin Sensitive <=0.25 mcg/mL Final    Tetracycline Sensitive <=4 mcg/mL Final    Trimeth/Sulfa Sensitive <=0.5/9.5 mcg/mL Final    Vancomycin Sensitive 2 mcg/mL  Final                             Assessment:  Active Hospital Problems    Diagnosis   • *Pneumonia [J18.9]   • Cavitary lesion of lung [J98.4]   • Hyponatremia [E87.1]   • Hypokalemia [E87.6]   • Hypocalcemia [E83.51]   • Hypoalbuminemia [E88.09]   • Normocytic anemia [D64.9]   • Type II diabetes mellitus (CMS-HCC) [E11.9]       Plan:  Cavitary lung lesions  Low grade fever resolved  No leukocytosis  Blood cxs neg  HIV neg  Sputum +MSSA  S/p Bronch with BAL on 1/31  BAL gram stain +GPC, Cx - MSSA  AFB stains neg  Continue Unasyn and vori  Cocci serology +IgM, -IgM-continue vori  Anticipate at least 2 weeks of IV abx followed by PO abx  PICC ordered  Can do ertapenem 1 g daily  Likely will need JOI    Diabetes, poorly controlled  Keep BS under 150 to help control current infection  Hgb A1C 12.5    If pt goes to SNF, pt can remain on unasyn. However, he lives in [place] and prefers to go to [facility]. His PCP will need to take over abx orders for ertapenem if pt goes home.    Discussed with internal medicine/Dr. Bianchi

## 2018-02-06 NOTE — DISCHARGE PLANNING
Medical Social Work    Received call back from Dr. Rodriguez's office. Per MD [place] not willing to follow order due to not seeing pt since 2016.     Met with pt at bedside to discuss. Informed pt he will need to go to SNF to finish IV infusions. Provided pt with Medicare list of SNF facilities in [facility]. Pt requested to discuss with his wife. Pt stating he may be willing to drive to [place] to do outpt infusions here instead of SNF.     Will follow up with pt after he's discussed with wife    Update    Followed up with pt. Pt states he would like to complete IV infusions at [facility] infusion [place]. Paged ID MD to request [facility] order.

## 2018-02-07 LAB
ALBUMIN SERPL BCP-MCNC: 1.9 G/DL (ref 3.2–4.9)
BUN SERPL-MCNC: 8 MG/DL (ref 8–22)
CALCIUM SERPL-MCNC: 7.4 MG/DL (ref 8.5–10.5)
CHLORIDE SERPL-SCNC: 107 MMOL/L (ref 96–112)
CO2 SERPL-SCNC: 23 MMOL/L (ref 20–33)
CREAT SERPL-MCNC: 0.65 MG/DL (ref 0.5–1.4)
CRP SERPL HS-MCNC: 14.39 MG/DL (ref 0–0.75)
ERYTHROCYTE [SEDIMENTATION RATE] IN BLOOD BY WESTERGREN METHOD: 119 MM/HOUR (ref 0–20)
GLUCOSE BLD-MCNC: 114 MG/DL (ref 65–99)
GLUCOSE BLD-MCNC: 130 MG/DL (ref 65–99)
GLUCOSE BLD-MCNC: 136 MG/DL (ref 65–99)
GLUCOSE SERPL-MCNC: 111 MG/DL (ref 65–99)
PHOSPHATE SERPL-MCNC: 3.5 MG/DL (ref 2.5–4.5)
POTASSIUM SERPL-SCNC: 4.5 MMOL/L (ref 3.6–5.5)
SODIUM SERPL-SCNC: 135 MMOL/L (ref 135–145)

## 2018-02-07 PROCEDURE — 770001 HCHG ROOM/CARE - MED/SURG/GYN PRIV*

## 2018-02-07 PROCEDURE — 86140 C-REACTIVE PROTEIN: CPT

## 2018-02-07 PROCEDURE — A9270 NON-COVERED ITEM OR SERVICE: HCPCS | Performed by: HOSPITALIST

## 2018-02-07 PROCEDURE — 700102 HCHG RX REV CODE 250 W/ 637 OVERRIDE(OP): Performed by: INTERNAL MEDICINE

## 2018-02-07 PROCEDURE — 700105 HCHG RX REV CODE 258: Performed by: INTERNAL MEDICINE

## 2018-02-07 PROCEDURE — 700111 HCHG RX REV CODE 636 W/ 250 OVERRIDE (IP): Performed by: INTERNAL MEDICINE

## 2018-02-07 PROCEDURE — 80069 RENAL FUNCTION PANEL: CPT

## 2018-02-07 PROCEDURE — 85652 RBC SED RATE AUTOMATED: CPT

## 2018-02-07 PROCEDURE — 99232 SBSQ HOSP IP/OBS MODERATE 35: CPT | Performed by: INTERNAL MEDICINE

## 2018-02-07 PROCEDURE — A9270 NON-COVERED ITEM OR SERVICE: HCPCS | Performed by: INTERNAL MEDICINE

## 2018-02-07 PROCEDURE — 700102 HCHG RX REV CODE 250 W/ 637 OVERRIDE(OP): Performed by: HOSPITALIST

## 2018-02-07 PROCEDURE — 82962 GLUCOSE BLOOD TEST: CPT | Mod: 91

## 2018-02-07 RX ADMIN — GUAIFENESIN 600 MG: 600 TABLET, EXTENDED RELEASE ORAL at 21:04

## 2018-02-07 RX ADMIN — GUAIFENESIN 200 MG: 100 SOLUTION ORAL at 04:07

## 2018-02-07 RX ADMIN — AMPICILLIN SODIUM AND SULBACTAM SODIUM 3 G: 2; 1 INJECTION, POWDER, FOR SOLUTION INTRAMUSCULAR; INTRAVENOUS at 03:37

## 2018-02-07 RX ADMIN — INSULIN GLARGINE 18 UNITS: 100 INJECTION, SOLUTION SUBCUTANEOUS at 21:10

## 2018-02-07 RX ADMIN — OXYCODONE HYDROCHLORIDE 5 MG: 5 TABLET ORAL at 04:07

## 2018-02-07 RX ADMIN — GUAIFENESIN 200 MG: 100 SOLUTION ORAL at 21:04

## 2018-02-07 RX ADMIN — POTASSIUM CHLORIDE 40 MEQ: 1500 TABLET, EXTENDED RELEASE ORAL at 21:04

## 2018-02-07 RX ADMIN — AMPICILLIN SODIUM AND SULBACTAM SODIUM 3 G: 2; 1 INJECTION, POWDER, FOR SOLUTION INTRAMUSCULAR; INTRAVENOUS at 08:09

## 2018-02-07 RX ADMIN — AMPICILLIN SODIUM AND SULBACTAM SODIUM 3 G: 2; 1 INJECTION, POWDER, FOR SOLUTION INTRAMUSCULAR; INTRAVENOUS at 21:04

## 2018-02-07 RX ADMIN — GUAIFENESIN 200 MG: 100 SOLUTION ORAL at 15:29

## 2018-02-07 RX ADMIN — POTASSIUM CHLORIDE 40 MEQ: 1500 TABLET, EXTENDED RELEASE ORAL at 08:08

## 2018-02-07 RX ADMIN — OXYCODONE HYDROCHLORIDE 5 MG: 5 TABLET ORAL at 16:51

## 2018-02-07 RX ADMIN — INSULIN HUMAN 2 UNITS: 100 INJECTION, SOLUTION PARENTERAL at 21:09

## 2018-02-07 RX ADMIN — GUAIFENESIN 600 MG: 600 TABLET, EXTENDED RELEASE ORAL at 08:08

## 2018-02-07 RX ADMIN — ENOXAPARIN SODIUM 40 MG: 100 INJECTION SUBCUTANEOUS at 08:08

## 2018-02-07 RX ADMIN — AMPICILLIN SODIUM AND SULBACTAM SODIUM 3 G: 2; 1 INJECTION, POWDER, FOR SOLUTION INTRAMUSCULAR; INTRAVENOUS at 15:32

## 2018-02-07 RX ADMIN — ZOLPIDEM TARTRATE 5 MG: 5 TABLET, FILM COATED ORAL at 23:57

## 2018-02-07 RX ADMIN — FLUCONAZOLE 400 MG: 100 TABLET ORAL at 08:08

## 2018-02-07 ASSESSMENT — ENCOUNTER SYMPTOMS
VOMITING: 0
NAUSEA: 0
HEADACHES: 0
WEAKNESS: 1
CHILLS: 0
COUGH: 1
DIARRHEA: 0
SHORTNESS OF BREATH: 0
DIZZINESS: 0
HEMOPTYSIS: 0
WHEEZING: 0
COUGH: 0
SPUTUM PRODUCTION: 1
ABDOMINAL PAIN: 0
FEVER: 0

## 2018-02-07 ASSESSMENT — COGNITIVE AND FUNCTIONAL STATUS - GENERAL
MOBILITY SCORE: 24
SUGGESTED CMS G CODE MODIFIER DAILY ACTIVITY: CH
DAILY ACTIVITIY SCORE: 24
SUGGESTED CMS G CODE MODIFIER MOBILITY: CH

## 2018-02-07 ASSESSMENT — PAIN SCALES - GENERAL
PAINLEVEL_OUTOF10: 5
PAINLEVEL_OUTOF10: 3
PAINLEVEL_OUTOF10: 0
PAINLEVEL_OUTOF10: 3
PAINLEVEL_OUTOF10: 0
PAINLEVEL_OUTOF10: 0
PAINLEVEL_OUTOF10: 3

## 2018-02-07 NOTE — DISCHARGE PLANNING
Medical Social Work    Spoke with GERMAINE HOUSTON. MD will speak with pt at bedside tomorrow. Pt would like to do outpt infusions at [facility] infusion [place]. Pt states he will try to get into his PCP's office and have PCP take over order once he re-establishes.

## 2018-02-07 NOTE — PROGRESS NOTES
Report received. Patient oriented x4. Pain level 0 out of 10. Denies SOB, congested small productive cough. Fall risk interventions in place, bed in low position, pt up self. Assessment completed.

## 2018-02-07 NOTE — PROGRESS NOTES
Infectious Disease Progress Note    Author: Stephanie Melara M.D. Date & Time of service: 2018  3:06 PM    Chief Complaint:  Cavitary lung lesions      Interval History:  57-year-old diabetic  admitted due to worsening cough, found to have CT with bilateral upper lobe   cavitary lesions   Temp 100.8, sputum +SA Feels much better today   AF WBC 10.9 feels better-awaiting bronch   AF, WBC 10.8, denies any SOB, had bronch yesterday, no abd pain or diarrhea   AF, no CBC, upset he did not get a breathing treatment last night, had cough fit overnight, on 1 LNC  2/3 AF, no CBC, on room air, breathing better with decreased sputum production   AF, no CBC, moved to a private room, continue to saturate well on RA, no new symptoms  18- MAXIMUM TEMPERATURE 99.4 wbc 10.8 platelets 257 and 0.55 cc feels much better   18- MAXIMUM TEMPERATURE 98.6 no new labs available since he feels much better breathing is much better  2018-MAXIMUM TEMPERATURE 99.7. WBCs 10.8 platelets 257 sedimentation rate 119  Labs Reviewed, Medications Reviewed and Radiology Reviewed.    Review of Systems:  Review of Systems   Constitutional: Negative for chills and fever.   Respiratory: Positive for sputum production. Negative for cough, hemoptysis, shortness of breath and wheezing.         Has only occasional cough now   Cardiovascular: Negative for chest pain.   Gastrointestinal: Negative for abdominal pain, diarrhea, nausea and vomiting.   All other systems reviewed and are negative.      Hemodynamics:  Temp (24hrs), Av.8 °C (98.3 °F), Min:36.1 °C (96.9 °F), Max:37.6 °C (99.7 °F)  Temperature: 36.1 °C (96.9 °F)  Pulse  Av  Min: 64  Max: 119  Blood Pressure: 135/69       Physical Exam:  Physical Exam   Constitutional: He is oriented to person, place, and time. He appears well-developed. No distress.   HENT:   Head: Normocephalic and atraumatic.   Eyes: EOM are normal. Pupils are equal, round, and reactive  to light.   Neck: Neck supple.   Cardiovascular: Normal rate.    No murmur heard.  Pulmonary/Chest: Effort normal. No respiratory distress. He has no wheezes. He has rales.   Abdominal: Soft. He exhibits no distension. There is no tenderness. There is no rebound.   Musculoskeletal: He exhibits no edema.   Neurological: He is alert and oriented to person, place, and time.   Skin: No rash noted.   Nursing note and vitals reviewed.      Meds:    Current Facility-Administered Medications:   •  insulin glargine  •  fluconazole  •  PICC Line Insertion has been implemented **AND** May use Lidocaine 1% not to exceed 3 mls for local at insertion site **AND** NOTIFY MD **AND** Tip to dwell in the superior vena cava **AND** Do not use PICC Line until placement verified by Chest X Ray **AND** If radiologist reading of chest X-ray states any of the following the PICC should be used **AND** Further evaluation of the PICC placement can be retrieved from X-Ray and Imaging **AND** Blood draws through PICC line; draws by RN only **AND** FLUSHING GUIDELINES WHEN IN USE **AND** normal saline PF **AND** FLUSHING GUIDELINES WHEN NOT IN USE **AND** DRESSING MAINTENANCE **AND** Change needleless pressure ports and IV tubing every 72 hours per hospital policy **AND** TUBING **AND** If there is an MD order to remove the PICC line, any RN may remove the PICC line **AND** [] PATIENT EDUCATION MATERIALS **AND** NURSING COMMUNICATION  •  guaiFENesin LA  •  potassium chloride SA  •  enoxaparin (LOVENOX) injection  •  NS  •  ampicillin-sulbactam (UNASYN) IV  •  mag hydrox-al hydrox-simeth  •  ipratropium-albuterol  •  senna-docusate **AND** polyethylene glycol/lytes **AND** magnesium hydroxide **AND** bisacodyl  •  Respiratory Care per Protocol  •  acetaminophen  •  Notify provider if pain remains uncontrolled **AND** Use the numeric rating scale (NRS-11) on regular floors and Critical-Care Pain Observation Tool (CPOT) on ICUs/Trauma to  assess pain **AND** Pulse Ox (Oximetry) **AND** Pharmacy Consult Request **AND** If patient difficult to arouse and/or has respiratory depression, stop any opiates that are currently infusing and call a Rapid Response. **AND** oxyCODONE immediate-release **AND** oxyCODONE immediate-release **AND** morphine injection  •  insulin regular **AND** Accu-Chek ACHS **AND** NOTIFY MD and PharmD **AND** glucose 4 g **AND** dextrose 50%  •  ondansetron  •  ondansetron  •  promethazine  •  promethazine  •  prochlorperazine  •  zolpidem  •  ibuprofen  •  guaiFENesin    Labs:  No results for input(s): WBC, RBC, HEMOGLOBIN, HEMATOCRIT, MCV, MCH, RDW, PLATELETCT, MPV, NEUTSPOLYS, LYMPHOCYTES, MONOCYTES, EOSINOPHILS, BASOPHILS, RBCMORPHOLO in the last 72 hours.  Recent Labs      02/05/18   0213  02/07/18   0347   SODIUM  135  135   POTASSIUM  4.6  4.5   CHLORIDE  105  107   CO2  24  23   GLUCOSE  147*  111*   BUN  5*  8     Recent Labs      02/05/18   0213  02/07/18   0347   ALBUMIN  1.8*  1.9*   CREATININE  0.55  0.65       Imaging:  No results found.    Micro:  Results     Procedure Component Value Units Date/Time    AFB CULTURE [[id number]] Collected:  01/31/18 1630    Order Status:  Completed Specimen:  Respirate from Bronchoalveolar Lavage Updated:  02/03/18 1217     Significant Indicator NEG     Source RESP     Site Bronchial Washings     AFB Culture Culture in progress.     AFB Smear Results No acid fast bacilli seen.    Narrative:       Collected By:[id number] GONDAL MUHAMMAD K  JEANE    CULTURE RESPIRATORY W/ GRM STN [[id number]]  (Abnormal) Collected:  01/31/18 1630    Order Status:  Completed Specimen:  Respirate from Bronchoalveolar Lavage Updated:  02/03/18 1217     Gram Stain Result --     Few WBCs.  Few Gram positive cocci.       Significant Indicator POS (POS)     Source RESP     Site Bronchial Washings     Respiratory Culture -- (A)     Growth noted after further incubation,see below for  organism identification.        Respiratory Culture -- (A)     Staphylococcus aureus  Light growth  See previous culture for sensitivity report.      Narrative:       Collected By:[id number] VELIAAL ESPARZA K  JEANE    FUNGAL CULTURE [[id number]] Collected:  01/31/18 1630    Order Status:  Completed Specimen:  Respirate from Bronchoalveolar Lavage Updated:  02/03/18 1217     Significant Indicator NEG     Source RESP     Site Bronchial Washings     Fungal Culture Culture in progress.    Narrative:       Collected By:[id number] GONDAL ESPARZA K  JEANE    AFB [[id number]] Collected:  01/31/18 1630    Order Status:  Completed Specimen:  Respirate from Bronchoalveolar Lavage Updated:  02/03/18 1216     Significant Indicator NEG     Source RESP     Site Bronchoalveolar Lavage RUL     AFB Culture Culture in progress.     AFB Smear Results No acid fast bacilli seen.    Narrative:       Collected By:[id number] GONDAL ESPARZA K  JEANE    FUNGAL CULTURE [[id number]] Collected:  01/31/18 1630    Order Status:  Completed Specimen:  Respirate from Bronchoalveolar Lavage Updated:  02/03/18 1216     Significant Indicator NEG     Source RESP     Site Bronchoalveolar Lavage RUL     Fungal Culture Culture in progress.    Narrative:       Collected By:[id number] GONDAL ESPARZA K  JEANE    QUANT BRONCHIAL WASHING [[id number]]  (Abnormal) Collected:  01/31/18 1630    Order Status:  Completed Specimen:  Respirate Updated:  02/03/18 1216     Gram Stain Result --     Rare WBCs.  Rare Gram positive cocci.  <5% intracellular organisms.       Significant Indicator POS (POS)     Source RESP     Site Bronchoalveolar Lavage RUL     Quantitative Bronch Washing -- (A)     Growth noted after further incubation,see below for  organism identification.       Quantitative Bronch Washing -- (A)     Staphylococcus aureus  10,000 cfu/mL  See previous culture for sensitivity report.      Narrative:       Collected By:[id number] GONDAL ESPARZA K  JEANE    FUNGAL CULTURE [[id number]] Collected:  01/31/18 1630    Order  Status:  Completed Specimen:  Respirate from Bronchoalveolar Lavage Updated:  02/03/18 1215     Significant Indicator NEG     Source RESP     Site Bronchoalveolar Lavage JEANE     Fungal Culture Culture in progress.    Narrative:       Collected By:[id number] GONDAL MUHAMMAD K  RUL  Collected By:[id number] GONDAL MUHAMMAD K  JEANE    AFB [[id number]] Collected:  01/31/18 1630    Order Status:  Completed Specimen:  Respirate from Bronchoalveolar Lavage Updated:  02/03/18 1215     Significant Indicator NEG     Source RESP     Site Bronchoalveolar Lavage JEANE     AFB Culture Culture in progress.     AFB Smear Results No acid fast bacilli seen.    Narrative:       Collected By:[id number] GONDAL MUHAMMAD K  RUL  Collected By:[id number] GONDAL MUHAMMAD K  JEANE    QUANT BRONCHIAL WASHING [[id number]]  (Abnormal)  (Susceptibility) Collected:  01/31/18 1630    Order Status:  Completed Specimen:  Respirate Updated:  02/03/18 1215     Gram Stain Result --     Many WBCs.  Moderate Gram positive cocci.  <5% intracellular organisms.       Significant Indicator POS (POS)     Source RESP     Site Bronchoalveolar Lavage JEANE     Quantitative Bronch Washing -- (A)     Growth noted after further incubation,see below for  organism identification.       Quantitative Bronch Washing -- (A)     Staphylococcus aureus  40,000 cfu/mL      Narrative:       Collected By:[id number] GONDAL MUHAMMAD K  RUL  Collected By:[id number] GONDAL MUHAMMAD K  JEANE    Culture & Susceptibility     STAPHYLOCOCCUS AUREUS     Antibiotic Sensitivity Microscan Unit Status    Ampicillin/sulbactam Sensitive <=8/4 mcg/mL Final    Clindamycin Sensitive <=0.5 mcg/mL Final    Erythromycin Sensitive <=0.5 mcg/mL Final    Moxifloxacin Sensitive <=0.5 mcg/mL Final    Oxacillin Sensitive <=0.25 mcg/mL Final    Tetracycline Sensitive <=4 mcg/mL Final    Trimeth/Sulfa Sensitive <=0.5/9.5 mcg/mL Final    Vancomycin Sensitive 2 mcg/mL Final                       BLOOD CULTURE [[id number]] Collected:  01/28/18  "1908    Order Status:  Completed Specimen:  Blood from Peripheral Updated:  02/02/18 2100     Significant Indicator NEG     Source BLD     Site PERIPHERAL     Blood Culture No growth after 5 days of incubation.    Narrative:       Per Hospital Policy: Only change Specimen Src: to \"Line\" if  specified by physician order.    BLOOD CULTURE [[id number]] Collected:  01/28/18 1908    Order Status:  Completed Specimen:  Blood from Peripheral Updated:  02/02/18 2100     Significant Indicator NEG     Source BLD     Site PERIPHERAL     Blood Culture No growth after 5 days of incubation.    Narrative:       Per Hospital Policy: Only change Specimen Src: to \"Line\" if  specified by physician order.    AFB CULTURE [[id number]] Collected:  01/31/18 1630    Order Status:  Completed Specimen:  Respirate from Bronchoalveolar Lavage Updated:  02/02/18 1054     Significant Indicator NEG     Source RESP     Site Bronchial Brushings JEANE     AFB Culture Culture in progress.     AFB Smear Results No acid fast bacilli seen.    Narrative:       Collected By:[id number] GONDAL MUHAMMAD K  RUL    RESP CULTURE [[id number]] Collected:  01/31/18 1630    Order Status:  Completed Specimen:  Respirate from Bronchoalveolar Lavage Updated:  02/02/18 1054     Gram Stain Result No organisms seen.     Significant Indicator NEG     Source RESP     Site Bronchial Brushings JEANE     Respiratory Culture No growth at 48 hours.    Narrative:       Collected By:[id number] GONDAL MUHAMMAD K  RUL    FUNGAL CULTURE [[id number]] Collected:  01/31/18 1630    Order Status:  Completed Specimen:  Respirate from Bronchoalveolar Lavage Updated:  02/02/18 1054     Significant Indicator NEG     Source RESP     Site Bronchial Brushings JEANE     Fungal Culture Culture in progress.    Narrative:       Collected By:[id number] GONDAL MUHAMMAD K  RUL    GRAM STAIN [[id number]] Collected:  01/31/18 1630    Order Status:  Completed Specimen:  Respirate Updated:  02/01/18 1729     Significant " Indicator .     Source RESP     Site Bronchial Brushings JEANE     Gram Stain Result No organisms seen.    Narrative:       Collected By:[id number] GONDAL ESPARZA K  RUL    ACID FAST STAIN [[id number]] Collected:  01/31/18 1630    Order Status:  Completed Specimen:  Respirate Updated:  02/01/18 1729     Significant Indicator NEG     Source RESP     Site Bronchial Brushings JEANE     AFB Smear Results No acid fast bacilli seen.    Narrative:       Collected By:[id number] GONDAL ESPARZA K  RUL    GRAM STAIN [[id number]] Collected:  01/31/18 1630    Order Status:  Completed Specimen:  Respirate Updated:  02/01/18 1729     Significant Indicator .     Source RESP     Site Bronchial Washings     Gram Stain Result --     Few WBCs.  Few Gram positive cocci.      Narrative:       Collected By:[id number] GONDAL ESPARZA K  JEANE    ACID FAST STAIN [[id number]] Collected:  01/31/18 1630    Order Status:  Completed Specimen:  Respirate Updated:  02/01/18 1729     Significant Indicator NEG     Source RESP     Site Bronchial Washings     AFB Smear Results No acid fast bacilli seen.    Narrative:       Collected By:[id number] GONDAL ESPARZA K  JEANE    GRAM STAIN [[id number]] Collected:  01/31/18 1630    Order Status:  Completed Specimen:  Respirate Updated:  02/01/18 1729     Significant Indicator .     Source RESP     Site Bronchoalveolar Lavage RUL     Gram Stain Result --     Rare WBCs.  Rare Gram positive cocci.  <5% intracellular organisms.      Narrative:       Collected By:[id number] GONDAL ESPARZA K  JEANE    ACID FAST STAIN [[id number]] Collected:  01/31/18 1630    Order Status:  Completed Specimen:  Respirate Updated:  02/01/18 1729     Significant Indicator NEG     Source RESP     Site Bronchoalveolar Lavage RUL     AFB Smear Results No acid fast bacilli seen.    Narrative:       Collected By:[id number] GONDAL ESPARZA K  JEANE    GRAM STAIN [[id number]] Collected:  01/31/18 1630    Order Status:  Completed Specimen:  Respirate Updated:  02/01/18 1729      Significant Indicator .     Source RESP     Site Bronchoalveolar Lavage JEANE     Gram Stain Result --     Many WBCs.  Moderate Gram positive cocci.  <5% intracellular organisms.      Narrative:       Collected By:[id number] GONDAL MUHAMMAD K  RUL  Collected By:[id number] GONDAL MUHAMMAD K  JEANE    ACID FAST STAIN [[id number]] Collected:  01/31/18 1630    Order Status:  Completed Specimen:  Respirate Updated:  02/01/18 1729     Significant Indicator NEG     Source RESP     Site Bronchoalveolar Lavage JEANE     AFB Smear Results No acid fast bacilli seen.    Narrative:       Collected By:[id number] GONDAL MUHAMMAD K  RUL  Collected By:[id number] GONDAL MUHAMMAD K  JEANE    CULTURE RESPIRATORY W/ [facility] STN [[id number]] Collected:  01/31/18 1630    Order Status:  Canceled Specimen:  Other from Bronchoalveolar Lavage     CULTURE RESPIRATORY W/ GRM STN [[id number]] Collected:  01/31/18 1630    Order Status:  Canceled Specimen:  Other from Bronchoalveolar Lavage           Assessment:  Active Hospital Problems    Diagnosis   • *Pneumonia [J18.9]   • Cavitary lesion of lung [J98.4]   • Hyponatremia [E87.1]   • Hypokalemia [E87.6]   • Hypocalcemia [E83.51]   • Hypoalbuminemia [E88.09]   • Normocytic anemia [D64.9]   • Type II diabetes mellitus (CMS-HCC) [E11.9]       Plan:  Cavitary lung lesions  Low grade fever resolved  No leukocytosis  Blood cxs neg  HIV neg  Sputum +MSSA  S/p Bronch with BAL on 1/31  BAL gram stain +GPC, Cx - MSSA  AFB stains neg  Continue Unasyn and vori  Cocci serology +IgG, -IgM-change voriconazole to Diflucan high dose  PICC ordered  Can do ertapenem 1 g daily  [facility]I report still pending from 2/5/2018        Diabetes, poorly controlled  Keep BS under 150 to help control current infection  Hgb A1C 12.5        Discussed with internal medicine

## 2018-02-07 NOTE — DISCHARGE PLANNING
Per the request of the SW on floor the referral has been sent to [facility].

## 2018-02-07 NOTE — PROGRESS NOTES
Patient in bed, pain 3/10 shoulder pain, denies sob, having congested cough, non productive at this time.

## 2018-02-07 NOTE — CARE PLAN
Problem: Communication  Goal: The ability to communicate needs accurately and effectively will improve    Intervention: Educate patient and significant other/support system about the plan of care, procedures, treatments, medications and allow for questions   02/07/18 0038   OTHER   Pt & Family Have Been Educated on Methods Available to Report Concerns Related to Care, Treatment, Services, and Patient Safety Issues Yes   Abx, searching for place to have abx infused      Problem: Safety  Goal: Will remain free from falls    Intervention: Implement fall precautions   02/07/18 0038   OTHER   Environmental Precautions Treaded Slipper Socks on Patient;Bed in Low Position;Report Given to Other Health Care Providers Regarding Fall Risk;Personal Belongings, Wastebasket, Call Bell etc. in Easy Reach   IV Pole on Same Side of Bed as Bathroom Yes

## 2018-02-07 NOTE — PROGRESS NOTES
[facility] Hospitalist Progress Note    Date of Service: 2018    Chief Complaint  57 y.o. male admitted 2018 with cavitary pneumonia. Subsequent cultures show coccidiomycosis and MSSA. ID and pulmonary are following. He had diagnostic bronchoscopy on  by Dr. Muhammad Gondal    Interval Problem Update  No new issues  Seen with RN  Patient wishes to return to [place] however has not seen primary care doctor in greater than 2 years-primary care doctor is needed to sign off on order for outpatient infusion    Consultants/Specialty  Dr. Muhammad Gondal-pulmonary  Stephanie Melara MD-infectious disease    Disposition  Trying to coordinate outpatient infusion- wishes to return to [place]        Review of Systems   Constitutional: Positive for malaise/fatigue.   Respiratory: Positive for cough, sputum production (copious opaque tan) and shortness of breath. Negative for hemoptysis.    Cardiovascular: Positive for chest pain (mild).   Gastrointestinal: Negative for abdominal pain, nausea and vomiting.   Genitourinary: Negative for dysuria.   Neurological: Positive for weakness. Negative for dizziness and headaches.      Physical Exam  Laboratory/Imaging   Hemodynamics  Temp (24hrs), Av.4 °C (97.5 °F), Min:35.6 °C (96.1 °F), Max:37 °C (98.6 °F)   Temperature: 36.4 °C (97.5 °F)  Pulse  Av.7  Min: 65  Max: 119   Blood Pressure: 130/74      Respiratory      Respiration: 17, Pulse Oximetry: 95 %, O2 Daily Delivery Respiratory : Room Air with O2 Available     Given By:: Mouthpiece, Work Of Breathing / Effort: Mild  RUL Breath Sounds: Clear, RML Breath Sounds: Clear, RLL Breath Sounds: Diminished, JEANE Breath Sounds: Clear, LLL Breath Sounds: Diminished    Fluids    Intake/Output Summary (Last 24 hours) at 18 1647  Last data filed at 18 1600   Gross per 24 hour   Intake             1140 ml   Output             1100 ml   Net               40 ml       Nutrition  Orders Placed This Encounter    Procedures   • DIET ORDER     Standing Status:   Standing     Number of Occurrences:   1     Order Specific Question:   Diet:     Answer:   Diabetic [3]     Physical Exam   Constitutional: He is oriented to person, place, and time. He appears well-developed and well-nourished. No distress.   HENT:   Head: Normocephalic and atraumatic.   Mouth/Throat: Oropharynx is clear and moist.   Eyes: EOM are normal. Pupils are equal, round, and reactive to light. Right eye exhibits no discharge. Left eye exhibits no discharge.   Neck: Neck supple.   Cardiovascular: Normal rate and regular rhythm.    Pulmonary/Chest: Effort normal. No respiratory distress. He has no wheezes. He has no rales. He exhibits no tenderness.   Diminished except left lower lobe where there are increased rhonchi   Abdominal: Soft. Bowel sounds are normal. He exhibits no distension. There is no tenderness.   Musculoskeletal: He exhibits no edema or tenderness.   Neurological: He is alert and oriented to person, place, and time. No cranial nerve deficit.   Skin: Skin is warm and dry. He is not diaphoretic.   Psychiatric: He has a normal mood and affect.   Nursing note and vitals reviewed.          Recent Labs      02/04/18   0209  02/05/18   0213   SODIUM  134*  135   POTASSIUM  4.0  4.6   CHLORIDE  104  105   CO2  23  24   GLUCOSE  137*  147*   BUN  5*  5*   CREATININE  0.45*  0.55   CALCIUM  7.3*  7.5*                      Assessment/Plan     * Necrotizing pneumonia due to MSSA (CMS-[facility])- (present on admission)   Assessment & Plan    Coordinating outpatient IV therapy at the present time-2 weeks IV followed by oral therapy  MSSA, coccidiomycosis        Acute respiratory failure with hypoxia due to necrotizing pneumonia (CMS-[facility])- (present on admission)   Assessment & Plan    - Resolved. Doing well off oxygen supplements.  - Continue antibiotics/antifungal for necrotizing pneumonia. Continue respiratory support with PRN bronchodilators, and PRN oxygen  supplement.        Cavitary lesion of lung- (present on admission)   Assessment & Plan    - Likely necrotizing pneumonia from staph aureus. Awaiting fungal culture of BAL. BAL cytology did not show any malignancy, AFB organisms, or fungal elements.   - Continue antibiotics and antifungals.        Hypocalcemia- (present on admission)   Assessment & Plan    - Corrected calcium is 10.6 (normal).         Hypokalemia- (present on admission)   Assessment & Plan    Resolved        Hyponatremia- (present on admission)   Assessment & Plan    Mild        Uncontrolled type II diabetes mellitus (CMS-HCC)- (present on admission)   Assessment & Plan    Discussed dietary choices  He has sugars in the 200s at home as well  Increase Lantus        Hypoalbuminemia- (present on admission)   Assessment & Plan    - Continue to monitor.        Normocytic anemia- (present on admission)   Assessment & Plan    - Likely anemia of chronic disease, with markedly elevated ferritin.   - Hgb stable.            Reviewed items::  Labs reviewed, Medications reviewed and Radiology images reviewed  Mei catheter::  No Mei  DVT prophylaxis pharmacological::  Enoxaparin (Lovenox)  Antibiotics:  Treating active infection/contamination beyond 24 hours perioperative coverage

## 2018-02-07 NOTE — DISCHARGE PLANNING
Medical Social Work    Received call from Dr. Webber office. Pt made an appointment to be seen Friday 2/9/18 at 11:00 am. Per MD [place] will take over ID order after pt is seen.     Met with pt at bedside to discuss. Pt states he's agreeable to SNF placement in [facility], however unsure if they'll have a bed soon. Pt would like to verify prior to choosing local SNF. Pt would like to go to SNF until MD can arrange his IV abx.

## 2018-02-08 ENCOUNTER — APPOINTMENT (OUTPATIENT)
Dept: RADIOLOGY | Facility: MEDICAL CENTER | Age: 58
DRG: 166 | End: 2018-02-08
Attending: INTERNAL MEDICINE
Payer: OTHER MISCELLANEOUS

## 2018-02-08 LAB
GLUCOSE BLD-MCNC: 126 MG/DL (ref 65–99)
GLUCOSE BLD-MCNC: 167 MG/DL (ref 65–99)
GLUCOSE BLD-MCNC: 169 MG/DL (ref 65–99)
GLUCOSE BLD-MCNC: 183 MG/DL (ref 65–99)
GLUCOSE BLD-MCNC: 316 MG/DL (ref 65–99)

## 2018-02-08 PROCEDURE — 700105 HCHG RX REV CODE 258: Performed by: INTERNAL MEDICINE

## 2018-02-08 PROCEDURE — 700111 HCHG RX REV CODE 636 W/ 250 OVERRIDE (IP): Performed by: INTERNAL MEDICINE

## 2018-02-08 PROCEDURE — 700102 HCHG RX REV CODE 250 W/ 637 OVERRIDE(OP): Performed by: INTERNAL MEDICINE

## 2018-02-08 PROCEDURE — 770001 HCHG ROOM/CARE - MED/SURG/GYN PRIV*

## 2018-02-08 PROCEDURE — 82962 GLUCOSE BLOOD TEST: CPT | Mod: 91

## 2018-02-08 PROCEDURE — 99232 SBSQ HOSP IP/OBS MODERATE 35: CPT | Performed by: INTERNAL MEDICINE

## 2018-02-08 PROCEDURE — A9270 NON-COVERED ITEM OR SERVICE: HCPCS | Performed by: INTERNAL MEDICINE

## 2018-02-08 PROCEDURE — A9270 NON-COVERED ITEM OR SERVICE: HCPCS | Performed by: HOSPITALIST

## 2018-02-08 PROCEDURE — 700102 HCHG RX REV CODE 250 W/ 637 OVERRIDE(OP): Performed by: HOSPITALIST

## 2018-02-08 RX ADMIN — INSULIN HUMAN 6 UNITS: 100 INJECTION, SOLUTION PARENTERAL at 21:28

## 2018-02-08 RX ADMIN — GUAIFENESIN 600 MG: 600 TABLET, EXTENDED RELEASE ORAL at 21:28

## 2018-02-08 RX ADMIN — AMPICILLIN SODIUM AND SULBACTAM SODIUM 3 G: 2; 1 INJECTION, POWDER, FOR SOLUTION INTRAMUSCULAR; INTRAVENOUS at 08:30

## 2018-02-08 RX ADMIN — INSULIN HUMAN 2 UNITS: 100 INJECTION, SOLUTION PARENTERAL at 16:49

## 2018-02-08 RX ADMIN — GUAIFENESIN 200 MG: 100 SOLUTION ORAL at 05:32

## 2018-02-08 RX ADMIN — OXYCODONE HYDROCHLORIDE 5 MG: 5 TABLET ORAL at 21:30

## 2018-02-08 RX ADMIN — POTASSIUM CHLORIDE 40 MEQ: 1500 TABLET, EXTENDED RELEASE ORAL at 21:28

## 2018-02-08 RX ADMIN — ENOXAPARIN SODIUM 40 MG: 100 INJECTION SUBCUTANEOUS at 08:30

## 2018-02-08 RX ADMIN — AMPICILLIN SODIUM AND SULBACTAM SODIUM 3 G: 2; 1 INJECTION, POWDER, FOR SOLUTION INTRAMUSCULAR; INTRAVENOUS at 21:28

## 2018-02-08 RX ADMIN — AMPICILLIN SODIUM AND SULBACTAM SODIUM 3 G: 2; 1 INJECTION, POWDER, FOR SOLUTION INTRAMUSCULAR; INTRAVENOUS at 16:43

## 2018-02-08 RX ADMIN — FLUCONAZOLE 400 MG: 100 TABLET ORAL at 08:30

## 2018-02-08 RX ADMIN — POTASSIUM CHLORIDE 40 MEQ: 1500 TABLET, EXTENDED RELEASE ORAL at 08:31

## 2018-02-08 RX ADMIN — INSULIN HUMAN 2 UNITS: 100 INJECTION, SOLUTION PARENTERAL at 11:52

## 2018-02-08 RX ADMIN — AMPICILLIN SODIUM AND SULBACTAM SODIUM 3 G: 2; 1 INJECTION, POWDER, FOR SOLUTION INTRAMUSCULAR; INTRAVENOUS at 04:05

## 2018-02-08 RX ADMIN — INSULIN GLARGINE 18 UNITS: 100 INJECTION, SOLUTION SUBCUTANEOUS at 21:28

## 2018-02-08 RX ADMIN — GUAIFENESIN 600 MG: 600 TABLET, EXTENDED RELEASE ORAL at 08:31

## 2018-02-08 ASSESSMENT — ENCOUNTER SYMPTOMS
SHORTNESS OF BREATH: 0
DIZZINESS: 0
FEVER: 0
COUGH: 0
VOMITING: 0
NAUSEA: 0
WHEEZING: 0
SPUTUM PRODUCTION: 1
ABDOMINAL PAIN: 0
HEADACHES: 0
COUGH: 1
HEMOPTYSIS: 0
DIARRHEA: 0
CHILLS: 0
WEAKNESS: 1

## 2018-02-08 ASSESSMENT — PAIN SCALES - GENERAL
PAINLEVEL_OUTOF10: 7
PAINLEVEL_OUTOF10: 3
PAINLEVEL_OUTOF10: 3
PAINLEVEL_OUTOF10: 0

## 2018-02-08 NOTE — DISCHARGE PLANNING
[facility] called [facility] and spoke to Piedad. Per Piedad the referral has been denied. No Isolation bed available. SW on floor has been notified.

## 2018-02-08 NOTE — PROGRESS NOTES
Pulmonary Critical Care Progress Note        Chief Complaint: Inpatient follow up necrotizing pneumonia adilson    History of Present Illness: 57 y.o. male with history of DM type 2 presented to the ER as a transfer from [place]. He had symptoms of productive cough for 02 weeks duration accompanied by fever and sweating off and on. Expectoration is greenish brown. No hemoptysis.   Patient is a  and reports being exposed to sick contacts on a regular basis. He also has environmental exposure to dust, smoke and chemical toxins. He reports history of pneumonia twice previously. CT chest shows bilateral upper lobe consolidation with necrotic cavitary lesion and bilateral lower lobe patchy densities. Currently being treated empirically with antibiotics and antifungals. He hypokalemia and hyponatremia and hyperglycemia.    ROS: Constitutional: Fever off and on, with sweating, generalized weakness: Respiratory: Dyspnea, productive cough, no pleuritic chest pain. No wheeze, Cardiac: Denies palpitations, angina, orthopnea or paroxysmal nocturnal dyspnea , GI: No nausea, vomiting abdominal pain.  All other systems negative. Neuro: Denies headache, dizziness or seizures. Gu: No hematuria or dysuria, Musculoskeletal: No joint inflammation or arthralgia.    Interval Events:  1/30/18: Hyperglycemia has improved. Frequency of cough decreased with copious expectoration. Hydration status has improved. Normal appetite. No dysuria    1/31/17: No new complaints, however hypokalemia has worsened but hyponatremia has improved. Patient is NPO for bronchoscopy today. Will replace potassium and then proceed with bronchoscopy. Blood glucose levels improved. Afebrile. Has productive cough but no chest pain.    2/1/18: Bronchoscopy with BAL, brushings, TBBx  RUL,JEANE was performed yesterday. Mucopurulent secretions from bot upper lobes was removed. Patient was kept in telemetry overnight. He is comfortable today morning. Denies chest  pain. Has mild cough. No hemoptysis. No wheeze. Afebrile. ID splt has changed vancomycin to Unasyn. Had hypokalemia for which potassium supplementation was done prior to bronchoscopy. Hyperglycemia uncontrolled.    2/2/18: Patient feels much better clinically. Denies chest pain , afebrile. No hemoptysis.     2/3/18: No new change in last 24 hours. Hypokalemia is resolved. Patient remains afebrile. Has productive cough. No AFB or fungal elements seen on BAL, or brushing and biopsy specimens. No malignant cells seen. On Unasyn for staphylococcal necrotizing pneumonia and empirically on voriconazole for possible fungal pneumonia with cavitations.    2/4/18: Patient admits improvement in clinical status. Afebrile. No chest pain or hemoptysis. SpO2 improved and now not on O2 supplementation.  Appetite is good. No skin rash, joint pain.     2/5/18: Fever resolved. No chest pain. Still has productive cough but no hemoptysis. Scheduled for PICC line placement and possible JOI today.  Off O2 supplementation    2/6/18: No new development. Had cough and dyspnea overnight. PICC line placed yesterday. JOI performed. Results pending. Patient is afebrile.    2/7/18: last 24 hours were uneventful. Patient is afebrile. Voriconazole changed to diflucan by Id.     2/8/18: Patient lying comfortably in bed. No chest pain. No expectoration. Afebrile. Patient to be discharged home tomorrow and to complete antibiotics as outpatient.    PFSH:  No change.    Respiratory:    Pulse oximetry 94% on room air  Exam: Not in respiratory distress. Accessory muscles of respiration are not prominent. No cervical lymphadenopathy. Bilateral breath sounds audible with no wheeze or crepitations. No pleural rub.  Imaging: CT chest personally reviewed.  Bilateral upper lobe cavitary lesions thick walled with surrounding consolidation. RML,RLL and LLL patchy infiltrates. No effusion    HemoDynamics:  Pulse: 74  Blood Pressure: 147/74      Exam: No JVD, no  carotid bruit. First and second heart sounds audible. No murmur or pericardial rub. No dependent edema    Neuro:  GCS 15  Exam: Alert and orientated to time place and person. Tendon jerks intact. No focal neurological deficit    Fluids:  Intake/Output       18 0700 - 18 0659 18 0700 - 18 0659 18 07 - 18 0659       8197-2484 Total  5628-6797 Total 8143-2527 8208-0114 Total       Intake    P.O.  540  -- 540  900  -- 900  --  -- --    P.O. 540 -- 540 900 -- 900 -- -- --    I.V.  --  -- --  160  300 460  --  -- --    IV Piggyback Volume -- -- -- 100 200 300 -- -- --    IV Volume -- -- -- 60 100 160 -- -- --    Total Intake 540 -- 540 4412 686 0436 -- -- --       Output    Urine  --  300 300  1250  2000 3250  --  -- --    Number of Times Voided -- 3 x 3 x 2 x -- 2 x -- -- --    Void (ml) --  2000 3250 -- -- --    Total Output --  2000 3250 -- -- --       Net I/O     540 -300 240 -190 -1700 -1890 -- -- --        Weight: 93.3 kg (205 lb 11 oz)  Recent Labs      18   0347   SODIUM  135   POTASSIUM  4.5   CHLORIDE  107   CO2  23   BUN  8   CREATININE  0.65   PHOSPHORUS  3.5   CALCIUM  7.4*       GI/Nutrition:  Exam: Oral mucosa is moist. No oral thrush . Abdomen is soft, non tender. Bowel sounds audible.    Liver Function  Recent Labs      18   0347   GLUCOSE  111*       Heme:  No results for input(s): RBC, HEMOGLOBIN, HEMATOCRIT, PLATELETCT, PROTHROMBTM, APTT, INR, IRON, FERRITIN, TOTIRONBC in the last 72 hours.    Infectious Disease:  Temp  Av.1 °C (98.7 °F)  Min: 36.8 °C (98.2 °F)  Max: 37.2 °C (99 °F)  Micro: Blood cultures no growth   Sputum Gram stain: Many WBCs and Gram positive cocci  Sputum culture: Staphylococcus sensitive to unasyn  BAL, Brushing and TBBx: No fungal elements, No AFB. No malignant cells seen  Fungal serology: Positive for coccidioides IgG. Neg IgM      Current Facility-Administered Medications    Medication Dose Frequency Provider Last Rate Last Dose   • insulin glargine (LANTUS) injection 18 Units  18 Units Q EVENING Gretchen Fallon M.D.   18 Units at 02/07/18 2110   • fluconazole (DIFLUCAN) tablet 400 mg  400 mg DAILY Stephanie Melara M.D.   400 mg at 02/08/18 0830   • normal saline PF 10-20 mL  10-20 mL PRN Dee Hampton M.D.       • guaiFENesin LA (MUCINEX) tablet 600 mg  600 mg Q12HRS Tyson Bianchi M.D.   600 mg at 02/08/18 0831   • potassium chloride SA (Kdur) tablet 40 mEq  40 mEq BID Tyson Bianchi M.D.   40 mEq at 02/08/18 0831   • enoxaparin (LOVENOX) inj 40 mg  40 mg DAILY Muhammad K Gondal, M.D.   40 mg at 02/08/18 0830   • NS infusion   Continuous Muhammad K Gondal, M.D. 10 mL/hr at 02/03/18 2037     • ampicillin/sulbactam (UNASYN) 3 g in  mL IVPB  3 g Q6HRS Re Bermudez M.D.   Stopped at 02/08/18 0900   • mag hydrox-al hydrox-simeth (MAALOX PLUS ES or MYLANTA DS) suspension 10 mL  10 mL 4X/DAY PRN Lito Giraldo M.D.   10 mL at 01/29/18 1251   • ipratropium-albuterol (DUONEB) nebulizer solution 3 mL  3 mL Q4H PRN (RT) Lito Giraldo M.D.   3 mL at 02/05/18 2939   • senna-docusate (PERICOLACE or SENOKOT S) 8.6-50 MG per tablet 2 Tab  2 Tab BID Brandon Mota M.D.   2 Tab at 01/31/18 0811    And   • polyethylene glycol/lytes (MIRALAX) PACKET 1 Packet  1 Packet QDAY PRN Barndon Mota M.D.        And   • magnesium hydroxide (MILK OF MAGNESIA) suspension 30 mL  30 mL QDAY PRN Brandon Mota M.D.        And   • bisacodyl (DULCOLAX) suppository 10 mg  10 mg QDAY PRN Brandon Mota M.D.       • Respiratory Care per Protocol   Continuous RT Brandon Mota M.D.       • acetaminophen (TYLENOL) tablet 650 mg  650 mg Q6HRS PRN Brandon Mota M.D.       • Pharmacy Consult Request ...Pain Management Review   PRN Brandon Mota M.D.        And   • oxycodone immediate-release (ROXICODONE) tablet 2.5 mg  2.5 mg Q3HRS PRN Brandon Mota,  M.D.   Stopped at 02/01/18 1535    And   • oxycodone immediate-release (ROXICODONE) tablet 5 mg  5 mg Q3HRS PRLULU Mota M.D.   5 mg at 02/07/18 1651    And   • morphine (pf) 4 mg/ml injection 2 mg  2 mg Q3HRS ISRA Mota M.D.       • insulin regular (HUMULIN R) injection 2-9 Units  2-9 Units 4X/DAY ACHDIMITRI Mota M.D.   2 Units at 02/08/18 1152    And   • glucose 4 g chewable tablet 16 g  16 g Q15 MIN ISRA Mota M.D.        And   • dextrose 50% (D50W) injection 25 mL  25 mL Q15 MIN ISRA Mota M.D.       • ondansetron (ZOFRAN) syringe/vial injection 4 mg  4 mg Q4HRS ISRA Mota M.D.       • ondansetron (ZOFRAN ODT) dispertab 4 mg  4 mg Q4HRS ISRA Mota M.D.       • promethazine (PHENERGAN) tablet 12.5-25 mg  12.5-25 mg Q4HRS ISRA Mota M.D.       • promethazine (PHENERGAN) suppository 12.5-25 mg  12.5-25 mg Q4HRS ISRA Mota M.D.       • prochlorperazine (COMPAZINE) injection 5-10 mg  5-10 mg Q4HRS ISRA Mota M.D.       • zolpidem (AMBIEN) tablet 5 mg  5 mg HS PRN - MR X 1 Brandon Mota M.D.   5 mg at 02/07/18 2357   • ibuprofen (MOTRIN) tablet 400 mg  400 mg Q6HRS PRLULU Mota M.D.   400 mg at 02/06/18 0531   • guaiFENesin (ROBITUSSIN) 100 MG/5ML solution 200 mg  10 mL Q4HRS PRLULU Mota M.D.   200 mg at 02/08/18 0532     Last reviewed on 1/31/2018  3:30 PM by Evonne Trent R.N.    Quality  Measures:   Labs reviewed, Medications reviewed and Radiology images reviewed   Mei catheter: No Mei       DVT Prophylaxis: Enoxaparin (Lovenox)          Problems:  Necrotizing pneumonia bilateral upper lobes/ Lung abscess. S/p bronchoscopy  Growth of MSSA in BAL and bronchial washing culture on Unasyn  Empirically on Diflucan for possible coccidioides pneumonia  Diabetes Mellitus type 2 controlled  Hypokalemia / hyponatremia resolved.      Plan:  Continue Diflucan and  Unasyn  Incentive spirometry, Flutter valve  Fungal cultures pending will follow  Continue bronchodilators  Optimize management for hyperglycemia control    Sign off from pulmonary service.

## 2018-02-08 NOTE — DISCHARGE PLANNING
[facility] called [facility]. Piedad in admissions is morning huddle. [facility] will follow.

## 2018-02-08 NOTE — CARE PLAN
Problem: Safety  Goal: Will remain free from injury  Outcome: PROGRESSING AS EXPECTED  Patient up to self, but provided education on changing position slowly and to utulize call light to communicate needs.     Problem: Respiratory:  Goal: Respiratory status will improve  Outcome: PROGRESSING AS EXPECTED  Educated client on use of Incentive spirometer. Encouraged deep breath/coughing. Administered medications for cough PRN.

## 2018-02-08 NOTE — PROGRESS NOTES
[facility] Hospitalist Progress Note    Date of Service: 2018    Chief Complaint  57 y.o. male admitted 2018 with cavitary pneumonia. Subsequent cultures show coccidiomycosis and MSSA. ID and pulmonary are following. He had diagnostic bronchoscopy on  by Dr. Muhammad Gondal    Interval Problem Update  No new issues  Not to baseline but denies SOB  Discussed DC barriers w/ patient and SW    Consultants/Specialty  Dr. Muhammad Gondal-pulmonary  Stephanie Melara MD-infectious disease    Disposition  Trying to coordinate outpatient infusion- wishes to return to [place]        Review of Systems   Constitutional: Positive for malaise/fatigue.   Respiratory: Positive for cough (improved) and sputum production (less over past day). Negative for hemoptysis and shortness of breath.    Cardiovascular: Negative for chest pain.   Gastrointestinal: Negative for abdominal pain, nausea and vomiting.   Genitourinary: Negative for dysuria.   Neurological: Positive for weakness. Negative for dizziness and headaches.      Physical Exam  Laboratory/Imaging   Hemodynamics  Temp (24hrs), Av.9 °C (98.4 °F), Min:36.1 °C (96.9 °F), Max:37.6 °C (99.7 °F)   Temperature: 36.8 °C (98.2 °F)  Pulse  Av.7  Min: 64  Max: 119   Blood Pressure: 138/87      Respiratory      Respiration: 18, Pulse Oximetry: 94 %     Work Of Breathing / Effort: Mild  RUL Breath Sounds: Clear, RML Breath Sounds: Clear, RLL Breath Sounds: Diminished, JEANE Breath Sounds: Clear, LLL Breath Sounds: Diminished    Fluids    Intake/Output Summary (Last 24 hours) at 18 1757  Last data filed at 18 1600   Gross per 24 hour   Intake             1060 ml   Output             1550 ml   Net             -490 ml       Nutrition  Orders Placed This Encounter   Procedures   • DIET ORDER     Standing Status:   Standing     Number of Occurrences:   1     Order Specific Question:   Diet:     Answer:   Diabetic [3]     Physical Exam   Constitutional: He is  oriented to person, place, and time. He appears well-developed and well-nourished. No distress.   HENT:   Head: Normocephalic and atraumatic.   Mouth/Throat: Oropharynx is clear and moist.   Eyes: EOM are normal. Pupils are equal, round, and reactive to light. Right eye exhibits no discharge. Left eye exhibits no discharge.   Neck: Neck supple.   Cardiovascular: Normal rate and regular rhythm.    Pulmonary/Chest: Effort normal. No respiratory distress. He has no wheezes. He has no rales. He exhibits no tenderness.   Less rhonchi LLL   Abdominal: Soft. Bowel sounds are normal. He exhibits no distension. There is no tenderness.   Musculoskeletal: He exhibits no edema or tenderness.   Neurological: He is alert and oriented to person, place, and time. No cranial nerve deficit.   Skin: Skin is warm and dry. He is not diaphoretic.   Psychiatric: He has a normal mood and affect.   Nursing note and vitals reviewed.          Recent Labs      02/05/18   0213  02/07/18   0347   SODIUM  135  135   POTASSIUM  4.6  4.5   CHLORIDE  105  107   CO2  24  23   GLUCOSE  147*  111*   BUN  5*  8   CREATININE  0.55  0.65   CALCIUM  7.5*  7.4*                      Assessment/Plan     * Necrotizing pneumonia due to MSSA (CMS-[facility])- (present on admission)   Assessment & Plan    Coordinating outpatient IV therapy - may need to transition through SNF as no recent PCP  ID may require 6 weeks IV  MSSA, coccidiomycosis        Acute respiratory failure with hypoxia due to necrotizing pneumonia (CMS-[facility])- (present on admission)   Assessment & Plan    - Resolved. Doing well off oxygen supplements.  - Continue antibiotics/antifungal for necrotizing pneumonia. Continue respiratory support with PRN bronchodilators, and PRN oxygen supplement.        Cavitary lesion of lung- (present on admission)   Assessment & Plan    MSSA/ [place]cy- Continue antibiotics and antifungals.        Hypocalcemia- (present on admission)   Assessment & Plan    - Corrected calcium  is 10.6 (normal).         Hypokalemia- (present on admission)   Assessment & Plan    Resolved        Hyponatremia- (present on admission)   Assessment & Plan    Mild        Uncontrolled type II diabetes mellitus (CMS-HCC)- (present on admission)   Assessment & Plan    @ goal        Hypoalbuminemia- (present on admission)   Assessment & Plan    - Continue to monitor.        Normocytic anemia- (present on admission)   Assessment & Plan    - Likely anemia of chronic disease, with markedly elevated ferritin.   - Hgb stable.            Reviewed items::  Labs reviewed and Medications reviewed  Mei catheter::  No Mei  DVT prophylaxis pharmacological::  Enoxaparin (Lovenox)  Antibiotics:  Treating active infection/contamination beyond 24 hours perioperative coverage

## 2018-02-08 NOTE — PROGRESS NOTES
Pt observed, no change from original assessment, vss, no c/o pain at this time. Pt AAOx4, no other signs or symptoms of distress, fall precautions in place, call light within reach, all questions answered, will continue to monitor. Hourly rounding initiated.

## 2018-02-08 NOTE — PROGRESS NOTES
Pulmonary Critical Care Progress Note        Chief Complaint: Inpatient follow up necrotizing pneumonia adilson    History of Present Illness: 57 y.o. male with history of DM type 2 presented to the ER as a transfer from [place]. He had symptoms of productive cough for 02 weeks duration accompanied by fever and sweating off and on. Expectoration is greenish brown. No hemoptysis.   Patient is a  and reports being exposed to sick contacts on a regular basis. He also has environmental exposure to dust, smoke and chemical toxins. He reports history of pneumonia twice previously. CT chest shows bilateral upper lobe consolidation with necrotic cavitary lesion and bilateral lower lobe patchy densities. Currently being treated empirically with antibiotics and antifungals. He hypokalemia and hyponatremia and hyperglycemia.    ROS: Constitutional: Fever off and on, with sweating, generalized weakness: Respiratory: Dyspnea, productive cough, no pleuritic chest pain. No wheeze, Cardiac: Denies palpitations, angina, orthopnea or paroxysmal nocturnal dyspnea , GI: No nausea, vomiting abdominal pain.  All other systems negative. Neuro: Denies headache, dizziness or seizures. Gu: No hematuria or dysuria, Musculoskeletal: No joint inflammation or arthralgia.    Interval Events:  1/30/18: Hyperglycemia has improved. Frequency of cough decreased with copious expectoration. Hydration status has improved. Normal appetite. No dysuria    1/31/17: No new complaints, however hypokalemia has worsened but hyponatremia has improved. Patient is NPO for bronchoscopy today. Will replace potassium and then proceed with bronchoscopy. Blood glucose levels improved. Afebrile. Has productive cough but no chest pain.    2/1/18: Bronchoscopy with BAL, brushings, TBBx  RUL,JEANE was performed yesterday. Mucopurulent secretions from bot upper lobes was removed. Patient was kept in telemetry overnight. He is comfortable today morning. Denies chest  pain. Has mild cough. No hemoptysis. No wheeze. Afebrile. ID splt has changed vancomycin to Unasyn. Had hypokalemia for which potassium supplementation was done prior to bronchoscopy. Hyperglycemia uncontrolled.    2/2/18: Patient feels much better clinically. Denies chest pain , afebrile. No hemoptysis.     2/3/18: No new change in last 24 hours. Hypokalemia is resolved. Patient remains afebrile. Has productive cough. No AFB or fungal elements seen on BAL, or brushing and biopsy specimens. No malignant cells seen. On Unasyn for staphylococcal necrotizing pneumonia and empirically on voriconazole for possible fungal pneumonia with cavitations.    2/4/18: Patient admits improvement in clinical status. Afebrile. No chest pain or hemoptysis. SpO2 improved and now not on O2 supplementation.  Appetite is good. No skin rash, joint pain.     2/5/18: Fever resolved. No chest pain. Still has productive cough but no hemoptysis. Scheduled for PICC line placement and possible JOI today.  Off O2 supplementation    2/6/18: No new development. Had cough and dyspnea overnight. PICC line placed yesterday. JOI performed. Results pending. Patient is afebrile.    2/7/18: last 24 hours were uneventful. Patient is afebrile. Voriconazole changed to diflucan by Id.     PFSH:  No change.    Respiratory:    Pulse oximetry 94% on room air  Exam: Not in respiratory distress. Accessory muscles of respiration are not prominent. No cervical lymphadenopathy. Bilateral breath sounds audible with no wheeze or crepitations. No pleural rub.  Imaging: CT chest personally reviewed.  Bilateral upper lobe cavitary lesions thick walled with surrounding consolidation. RML,RLL and LLL patchy infiltrates. No effusion    HemoDynamics:  Pulse: 71  Blood Pressure: 138/87      Exam: No JVD, no carotid bruit. First and second heart sounds audible. No murmur or pericardial rub. No dependent edema    Neuro:  GCS 15  Exam: Alert and orientated to time place and  person. Tendon jerks intact. No focal neurological deficit    Fluids:  Intake/Output       18 0700 - 18 0659 18 07 - 18 0659 18 07 - 18 0659      [id number] 1898-2803 Total 1496-4400 7642-3438 Total 6402-8595 0396-6506 Total       Intake    P.O.  600  -- 600  540  -- 540  900  -- 900    P.O. 600 -- 600 540 -- 540 900 -- 900    I.V.  --  -- --  --  -- --  160  -- 160    IV Piggyback Volume -- -- -- -- -- -- 100 -- 100    IV Volume -- -- -- -- -- -- 60 -- 60    Total Intake 600 -- 600 540 -- 540 1060 -- 1060       Output    Urine  600  1100 1700  --  300 300  --  -- --    Number of Times Voided -- -- -- -- 3 x 3 x 2 x -- 2 x    Void (ml) 600 1100 1700 -- 300 300 -- -- --    Total Output 600 1100 1700 -- 300 300 -- -- --       Net I/O     0 -1100 -1100 540 - -- 1060        Weight: 93 kg (205 lb 0.4 oz)  Recent Labs      183  18   0347   SODIUM  135  135   POTASSIUM  4.6  4.5   CHLORIDE  105  107   CO2  24  23   BUN  5*  8   CREATININE  0.55  0.65   PHOSPHORUS  3.3  3.5   CALCIUM  7.5*  7.4*       GI/Nutrition:  Exam: Oral mucosa is moist. No oral thrush . Abdomen is soft, non tender. Bowel sounds audible.    Liver Function  Recent Labs      18   0213  02/07/18   0347   GLUCOSE  147*  111*       Heme:  No results for input(s): RBC, HEMOGLOBIN, HEMATOCRIT, PLATELETCT, PROTHROMBTM, APTT, INR, IRON, FERRITIN, TOTIRONBC in the last 72 hours.    Infectious Disease:  Temp  Av.9 °C (98.4 °F)  Min: 36.1 °C (96.9 °F)  Max: 37.6 °C (99.7 °F)  Micro: Blood cultures no growth   Sputum Gram stain: Many WBCs and Gram positive cocci  Sputum culture: Staphylococcus sensitive to unasyn  BAL, Brushing and TBBx: No fungal elements, No AFB. No malignant cells seen  Fungal serology: Positive for coccidioides IgG. Neg IgM      Current Facility-Administered Medications   Medication Dose Frequency Provider Last Rate Last Dose   • insulin glargine (LANTUS) injection  18 Units  18 Units Q EVENING Gretchen Fallon M.D.   18 Units at 02/06/18 2110   • fluconazole (DIFLUCAN) tablet 400 mg  400 mg DAILY Stephanie Melara M.D.   400 mg at 02/07/18 0808   • normal saline PF 10-20 mL  10-20 mL PRN Dee Hampton M.D.       • guaiFENesin LA (MUCINEX) tablet 600 mg  600 mg Q12HRS Tyson Bianchi M.D.   600 mg at 02/07/18 0808   • potassium chloride SA (Kdur) tablet 40 mEq  40 mEq BID Tyson Bianchi M.D.   40 mEq at 02/07/18 0808   • enoxaparin (LOVENOX) inj 40 mg  40 mg DAILY Muhammad K Gondal, M.D.   40 mg at 02/07/18 0808   • NS infusion   Continuous Muhammad K Gondal, M.D. 10 mL/hr at 02/03/18 2037     • ampicillin/sulbactam (UNASYN) 3 g in  mL IVPB  3 g Q6HRS Re Bermudez M.D. 200 mL/hr at 02/07/18 1532 3 g at 02/07/18 1532   • mag hydrox-al hydrox-simeth (MAALOX PLUS ES or MYLANTA DS) suspension 10 mL  10 mL 4X/DAY PRN Lito Giraldo M.D.   10 mL at 01/29/18 1251   • ipratropium-albuterol (DUONEB) nebulizer solution 3 mL  3 mL Q4H PRN (RT) Lito Giraldo M.D.   3 mL at 02/05/18 7509   • senna-docusate (PERICOLACE or SENOKOT S) 8.6-50 MG per tablet 2 Tab  2 Tab BID Brandon Mota M.D.   2 Tab at 01/31/18 0811    And   • polyethylene glycol/lytes (MIRALAX) PACKET 1 Packet  1 Packet QDAY PRN Brandon Mota M.D.        And   • magnesium hydroxide (MILK OF MAGNESIA) suspension 30 mL  30 mL QDAY PRN Brandon Mota M.D.        And   • bisacodyl (DULCOLAX) suppository 10 mg  10 mg QDAY PRN Brandon Mota M.D.       • Respiratory Care per Protocol   Continuous RT Brandon Mota M.D.       • acetaminophen (TYLENOL) tablet 650 mg  650 mg Q6HRS PRN Brandon Mota M.D.       • Pharmacy Consult Request ...Pain Management Review   PRN Brandon Mota M.D.        And   • oxycodone immediate-release (ROXICODONE) tablet 2.5 mg  2.5 mg Q3HRS PRN Brandon Mota M.D.   Stopped at 02/01/18 1535    And   • oxycodone immediate-release  (ROXICODONE) tablet 5 mg  5 mg Q3HRS PRN Brandon Mota M.D.   5 mg at 02/07/18 0407    And   • morphine (pf) 4 mg/ml injection 2 mg  2 mg Q3HRS PRN Brandon Mota M.D.       • insulin regular (HUMULIN R) injection 2-9 Units  2-9 Units 4X/DAY ACHDIMITRI Mota M.D.   Stopped at 02/07/18 0700    And   • glucose 4 g chewable tablet 16 g  16 g Q15 MIN PRN Brandon Mota M.D.        And   • dextrose 50% (D50W) injection 25 mL  25 mL Q15 MIN PRLULU Mota M.D.       • ondansetron (ZOFRAN) syringe/vial injection 4 mg  4 mg Q4HRS PRLULU Mota M.D.       • ondansetron (ZOFRAN ODT) dispertab 4 mg  4 mg Q4HRS PRLULU Mota M.D.       • promethazine (PHENERGAN) tablet 12.5-25 mg  12.5-25 mg Q4HRS PRLULU Mota M.D.       • promethazine (PHENERGAN) suppository 12.5-25 mg  12.5-25 mg Q4HRS PRLULU Mota M.D.       • prochlorperazine (COMPAZINE) injection 5-10 mg  5-10 mg Q4HRS PRLULU Mota M.D.       • zolpidem (AMBIEN) tablet 5 mg  5 mg HS PRN - MR X 1 Brandon Mota M.D.   5 mg at 02/06/18 2153   • ibuprofen (MOTRIN) tablet 400 mg  400 mg Q6HRS PRN Brandon Mota M.D.   400 mg at 02/06/18 0531   • guaiFENesin (ROBITUSSIN) 100 MG/5ML solution 200 mg  10 mL Q4HRS PRLULU Mota M.D.   200 mg at 02/07/18 1529     Last reviewed on 1/31/2018  3:30 PM by Evonne Trent R.N.    Quality  Measures:  Labs reviewed, Medications reviewed and Radiology images reviewed  Mei catheter: No Mei      DVT Prophylaxis: Enoxaparin (Lovenox)          Problems:  Necrotizing pneumonia bilateral upper lobes/ Lung abscess. 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      Plan:  Continue Diflucan and Unasyn  Incentive spirometry, Flutter valve  Fungal cultures pending will  follow  Continue bronchodilators  Optimize management for hyperglycemia control

## 2018-02-08 NOTE — DISCHARGE PLANNING
[facility] called [facility] and Spoke to Piedad. The referral printed incomplete [facility] faxed the missing information. They are running the insurance Benefits. They are also looking for an accepting MD. TRA on floor has been notified.

## 2018-02-08 NOTE — DISCHARGE PLANNING
Medical Social Work    Called PCP office and spoke with Alexandria. Confirmed pt still has his appointment for tomorrow. Faxed recent MD notes to office. Per Alexandria, MD will be writing abx order after pt's visit tomorrow and pt will be seen at [facility] the following day.     Discussed with MD, pt will get first dose of ertapenem tomorrow morning before early discharge.

## 2018-02-09 VITALS
RESPIRATION RATE: 18 BRPM | WEIGHT: 197.75 LBS | SYSTOLIC BLOOD PRESSURE: 134 MMHG | HEIGHT: 67 IN | TEMPERATURE: 98.9 F | BODY MASS INDEX: 31.04 KG/M2 | DIASTOLIC BLOOD PRESSURE: 75 MMHG | HEART RATE: 76 BPM | OXYGEN SATURATION: 91 %

## 2018-02-09 LAB
ALBUMIN SERPL BCP-MCNC: 2 G/DL (ref 3.2–4.9)
BUN SERPL-MCNC: 10 MG/DL (ref 8–22)
CALCIUM SERPL-MCNC: 7.8 MG/DL (ref 8.5–10.5)
CHLORIDE SERPL-SCNC: 106 MMOL/L (ref 96–112)
CO2 SERPL-SCNC: 22 MMOL/L (ref 20–33)
CREAT SERPL-MCNC: 0.75 MG/DL (ref 0.5–1.4)
GLUCOSE BLD-MCNC: 123 MG/DL (ref 65–99)
GLUCOSE SERPL-MCNC: 100 MG/DL (ref 65–99)
PHOSPHATE SERPL-MCNC: 3.4 MG/DL (ref 2.5–4.5)
POTASSIUM SERPL-SCNC: 4.3 MMOL/L (ref 3.6–5.5)
SODIUM SERPL-SCNC: 133 MMOL/L (ref 135–145)

## 2018-02-09 PROCEDURE — 700102 HCHG RX REV CODE 250 W/ 637 OVERRIDE(OP): Performed by: INTERNAL MEDICINE

## 2018-02-09 PROCEDURE — 700111 HCHG RX REV CODE 636 W/ 250 OVERRIDE (IP): Performed by: INTERNAL MEDICINE

## 2018-02-09 PROCEDURE — 700105 HCHG RX REV CODE 258: Performed by: INTERNAL MEDICINE

## 2018-02-09 PROCEDURE — 99239 HOSP IP/OBS DSCHRG MGMT >30: CPT | Performed by: INTERNAL MEDICINE

## 2018-02-09 PROCEDURE — A9270 NON-COVERED ITEM OR SERVICE: HCPCS | Performed by: INTERNAL MEDICINE

## 2018-02-09 PROCEDURE — A9270 NON-COVERED ITEM OR SERVICE: HCPCS | Performed by: HOSPITALIST

## 2018-02-09 PROCEDURE — 80069 RENAL FUNCTION PANEL: CPT

## 2018-02-09 PROCEDURE — 82962 GLUCOSE BLOOD TEST: CPT

## 2018-02-09 PROCEDURE — 700102 HCHG RX REV CODE 250 W/ 637 OVERRIDE(OP): Performed by: HOSPITALIST

## 2018-02-09 RX ORDER — FLUCONAZOLE 200 MG/1
400 TABLET ORAL DAILY
Qty: 38 TAB | Refills: 0 | Status: SHIPPED | OUTPATIENT
Start: 2018-02-09 | End: 2023-10-18

## 2018-02-09 RX ORDER — GUAIFENESIN 600 MG/1
600 TABLET, EXTENDED RELEASE ORAL EVERY 12 HOURS
Qty: 28 TAB | COMMUNITY
Start: 2018-02-09 | End: 2023-10-18

## 2018-02-09 RX ADMIN — FLUCONAZOLE 400 MG: 100 TABLET ORAL at 07:39

## 2018-02-09 RX ADMIN — SODIUM CHLORIDE 1000 MG: 900 INJECTION INTRAVENOUS at 04:37

## 2018-02-09 RX ADMIN — GUAIFENESIN 600 MG: 600 TABLET, EXTENDED RELEASE ORAL at 07:39

## 2018-02-09 RX ADMIN — OXYCODONE HYDROCHLORIDE 5 MG: 5 TABLET ORAL at 04:37

## 2018-02-09 RX ADMIN — POTASSIUM CHLORIDE 40 MEQ: 1500 TABLET, EXTENDED RELEASE ORAL at 07:39

## 2018-02-09 ASSESSMENT — PATIENT HEALTH QUESTIONNAIRE - PHQ9
2. FEELING DOWN, DEPRESSED, IRRITABLE, OR HOPELESS: NOT AT ALL
SUM OF ALL RESPONSES TO PHQ QUESTIONS 1-9: 0
1. LITTLE INTEREST OR PLEASURE IN DOING THINGS: NOT AT ALL
SUM OF ALL RESPONSES TO PHQ9 QUESTIONS 1 AND 2: 0

## 2018-02-09 ASSESSMENT — PAIN SCALES - GENERAL
PAINLEVEL_OUTOF10: 6
PAINLEVEL_OUTOF10: 0
PAINLEVEL_OUTOF10: 0

## 2018-02-09 NOTE — CARE PLAN
Problem: Respiratory:  Goal: Respiratory status will improve    Intervention: Assess and monitor pulmonary status   02/06/18 0001 02/08/18 0800 02/08/18 2000   OTHER   O2 (LPM) --  --  --    Work Of Breathing / Effort --  Mild --    Pre/Post Intervention Pre Intervention Assessment --  --    RUL Breath Sounds --  --  Clear   RML Breath Sounds --  --  Clear   RLL Breath Sounds --  --  Diminished   JEANE Breath Sounds --  --  Clear   LLL Breath Sounds --  --  Diminished   Vitals   Respiration --  --  --    Pulse Oximetry --  --  --    Respiratory   Cough --  --  Strong    02/09/18 0338   OTHER   O2 (LPM) 0   Work Of Breathing / Effort --    Pre/Post Intervention --    RUL Breath Sounds --    RML Breath Sounds --    RLL Breath Sounds --    JEANE Breath Sounds --    LLL Breath Sounds --    Vitals   Respiration 18   Pulse Oximetry 91 %   Respiratory   Cough --

## 2018-02-09 NOTE — PROGRESS NOTES
Pt given discharge instructions. Discussed diet, activity, follow-up, symptoms and management, and prescriptions provided. IV d/c'd. All questions answered. Transport called for wheelchair.

## 2018-02-09 NOTE — PROGRESS NOTES
[facility] Hospitalist Progress Note    Date of Service: 2018    Chief Complaint  57 y.o. male admitted 2018 with cavitary pneumonia. Subsequent cultures show coccidiomycosis and MSSA. ID and pulmonary are following. He had diagnostic bronchoscopy on  by Dr. Muhammad Gondal    Interval Problem Update  Continues to improve  Discharge plan of care discussed with charge nurse, bedside nurse, infectious disease, pharmacist.  Patient updated in agreeable with plan  We will give 1st dose of ertapenem at 5 AM  Provided there are no issues he will be discharged at 7 AM  He's to follow-up with his primary care at 11 AM in [place]  He's to start infusion at [facility] the following morning.  Per ID he will require 6 weeks of treatment    Consultants/Specialty  Dr. Muhammad Gondal-pulmonary  Stephanie Melara MD-infectious disease    Disposition  Home tomorrow        Review of Systems   Constitutional: Positive for malaise/fatigue (improving).   Respiratory: Positive for cough (improved) and sputum production (thinning out). Negative for hemoptysis and shortness of breath (depends on level of exertion).    Cardiovascular: Negative for chest pain.   Gastrointestinal: Negative for abdominal pain, nausea and vomiting.   Genitourinary: Negative for dysuria.   Neurological: Positive for weakness (improving). Negative for dizziness and headaches.      Physical Exam  Laboratory/Imaging   Hemodynamics  Temp (24hrs), Av.8 °C (98.2 °F), Min:36.2 °C (97.2 °F), Max:37.2 °C (99 °F)   Temperature: 36.2 °C (97.2 °F)  Pulse  Av.9  Min: 64  Max: 119   Blood Pressure: 137/77      Respiratory      Respiration: 18, Pulse Oximetry: 94 %     Work Of Breathing / Effort: Mild  RUL Breath Sounds: Clear, RML Breath Sounds: Clear, RLL Breath Sounds: Diminished, JEANE Breath Sounds: Clear, LLL Breath Sounds: Diminished    Fluids    Intake/Output Summary (Last 24 hours) at 18 9784  Last data filed at 18 1500   Gross per  24 hour   Intake              720 ml   Output             2850 ml   Net            -2130 ml       Nutrition  Orders Placed This Encounter   Procedures   • DIET ORDER     Standing Status:   Standing     Number of Occurrences:   1     Order Specific Question:   Diet:     Answer:   Diabetic [3]     Physical Exam   Constitutional: He is oriented to person, place, and time. He appears well-developed and well-nourished. No distress.   HENT:   Head: Normocephalic and atraumatic.   Mouth/Throat: Oropharynx is clear and moist.   Eyes: EOM are normal. Pupils are equal, round, and reactive to light. Right eye exhibits no discharge. Left eye exhibits no discharge.   Neck: Neck supple.   Cardiovascular: Normal rate and regular rhythm.    Pulmonary/Chest: Effort normal. No respiratory distress. He has no wheezes. He has no rales. He exhibits no tenderness.   No audible rhonchi, mildly diminished overall   Abdominal: Soft. Bowel sounds are normal. He exhibits no distension. There is no tenderness.   Musculoskeletal: He exhibits no edema or tenderness.   Neurological: He is alert and oriented to person, place, and time. No cranial nerve deficit.   Skin: Skin is warm and dry. He is not diaphoretic.   Psychiatric: He has a normal mood and affect.   Nursing note and vitals reviewed.          Recent Labs      02/07/18   0347   SODIUM  135   POTASSIUM  4.5   CHLORIDE  107   CO2  23   GLUCOSE  111*   BUN  8   CREATININE  0.65   CALCIUM  7.4*                      Assessment/Plan     * Necrotizing pneumonia due to MSSA (CMS-[facility])- (present on admission)   Assessment & Plan    Coordinating outpatient IV therapy - may need to transition through SNF as no recent PCP  ID may require 6 weeks IV  MSSA, coccidiomycosis        Acute respiratory failure with hypoxia due to necrotizing pneumonia (CMS-HCC)- (present on admission)   Assessment & Plan    - Resolved. Doing well off oxygen supplements.  - Continue antibiotics/antifungal for necrotizing  pneumonia. Continue respiratory support with PRN bronchodilators, and PRN oxygen supplement.        Cavitary lesion of lung- (present on admission)   Assessment & Plan    MSSA/ Coccy- Continue antibiotics and antifungals.        Hypocalcemia- (present on admission)   Assessment & Plan    - Corrected calcium is 10.6 (normal).         Hypokalemia- (present on admission)   Assessment & Plan    Resolved        Hyponatremia- (present on admission)   Assessment & Plan    Mild        Uncontrolled type II diabetes mellitus (CMS-HCC)- (present on admission)   Assessment & Plan    @ goal        Hypoalbuminemia- (present on admission)   Assessment & Plan    - Continue to monitor.        Normocytic anemia- (present on admission)   Assessment & Plan    - Likely anemia of chronic disease, with markedly elevated ferritin.   - Hgb stable.            Reviewed items::  Labs reviewed and Medications reviewed  Mei catheter::  No Mei  DVT prophylaxis pharmacological::  Enoxaparin (Lovenox)  Antibiotics:  Treating active infection/contamination beyond 24 hours perioperative coverage

## 2018-02-09 NOTE — PROGRESS NOTES
Infectious Disease Progress Note    Author: Stephanie Melara M.D. Date & Time of service: 2018  5:55 PM    Chief Complaint:  Cavitary lung lesions      Interval History:  57-year-old diabetic  admitted due to worsening cough, found to have CT with bilateral upper lobe   cavitary lesions   Temp 100.8, sputum +SA Feels much better today   AF WBC 10.9 feels better-awaiting bronch   AF, WBC 10.8, denies any SOB, had bronch yesterday, no abd pain or diarrhea   AF, no CBC, upset he did not get a breathing treatment last night, had cough fit overnight, on 1 LNC  2/3 AF, no CBC, on room air, breathing better with decreased sputum production   AF, no CBC, moved to a private room, continue to saturate well on RA, no new symptoms  18- MAXIMUM TEMPERATURE 99.4 wbc 10.8 platelets 257 and 0.55 cc feels much better   18- MAXIMUM TEMPERATURE 98.6 no new labs available since he feels much better breathing is much better  2018-MAXIMUM TEMPERATURE 99.7. WBCs 10.8 platelets 257 sedimentation rate 119  18-MAXIMUM TEMPERATURE 99 feels well  Labs Reviewed, Medications Reviewed and Radiology Reviewed.    Review of Systems:  Review of Systems   Constitutional: Negative for chills and fever.   Respiratory: Positive for sputum production. Negative for cough, hemoptysis, shortness of breath and wheezing.         Has only occasional cough now   Cardiovascular: Negative for chest pain.   Gastrointestinal: Negative for abdominal pain, diarrhea, nausea and vomiting.   All other systems reviewed and are negative.      Hemodynamics:  Temp (24hrs), Av.8 °C (98.2 °F), Min:36.2 °C (97.2 °F), Max:37.2 °C (99 °F)  Temperature: 36.2 °C (97.2 °F)  Pulse  Av.9  Min: 64  Max: 119  Blood Pressure: 137/77       Physical Exam:  Physical Exam   Constitutional: He is oriented to person, place, and time. He appears well-developed. No distress.   HENT:   Head: Normocephalic and atraumatic.   Eyes: EOM are  normal. Pupils are equal, round, and reactive to light.   Neck: Neck supple.   Cardiovascular: Normal rate.    No murmur heard.  Pulmonary/Chest: Effort normal. No respiratory distress. He has no wheezes. He has rales.   Abdominal: Soft. He exhibits no distension. There is no tenderness. There is no rebound.   Musculoskeletal: He exhibits no edema.   Neurological: He is alert and oriented to person, place, and time.   Skin: No rash noted.   Nursing note and vitals reviewed.      Meds:    Current Facility-Administered Medications:   •  [START ON 2018] ertapenem (INVANZ) IV  •  insulin glargine  •  fluconazole  •  PICC Line Insertion has been implemented **AND** May use Lidocaine 1% not to exceed 3 mls for local at insertion site **AND** NOTIFY MD **AND** Tip to dwell in the superior vena cava **AND** Do not use PICC Line until placement verified by Chest X Ray **AND** If radiologist reading of chest X-ray states any of the following the PICC should be used **AND** Further evaluation of the PICC placement can be retrieved from X-Ray and Imaging **AND** Blood draws through PICC line; draws by RN only **AND** FLUSHING GUIDELINES WHEN IN USE **AND** normal saline PF **AND** FLUSHING GUIDELINES WHEN NOT IN USE **AND** DRESSING MAINTENANCE **AND** Change needleless pressure ports and IV tubing every 72 hours per hospital policy **AND** TUBING **AND** If there is an MD order to remove the PICC line, any RN may remove the PICC line **AND** [] PATIENT EDUCATION MATERIALS **AND** NURSING COMMUNICATION  •  guaiFENesin LA  •  potassium chloride SA  •  enoxaparin (LOVENOX) injection  •  NS  •  ampicillin-sulbactam (UNASYN) IV  •  mag hydrox-al hydrox-simeth  •  ipratropium-albuterol  •  senna-docusate **AND** polyethylene glycol/lytes **AND** magnesium hydroxide **AND** bisacodyl  •  Respiratory Care per Protocol  •  acetaminophen  •  Notify provider if pain remains uncontrolled **AND** Use the numeric rating scale  (NRS-11) on regular floors and Critical-Care Pain Observation Tool (CPOT) on ICUs/Trauma to assess pain **AND** Pulse Ox (Oximetry) **AND** Pharmacy Consult Request **AND** If patient difficult to arouse and/or has respiratory depression, stop any opiates that are currently infusing and call a Rapid Response. **AND** oxyCODONE immediate-release **AND** oxyCODONE immediate-release **AND** morphine injection  •  insulin regular **AND** Accu-Chek ACHS **AND** NOTIFY MD and PharmD **AND** glucose 4 g **AND** dextrose 50%  •  ondansetron  •  ondansetron  •  promethazine  •  promethazine  •  prochlorperazine  •  zolpidem  •  ibuprofen  •  guaiFENesin    Labs:  No results for input(s): WBC, RBC, HEMOGLOBIN, HEMATOCRIT, MCV, MCH, RDW, PLATELETCT, MPV, NEUTSPOLYS, LYMPHOCYTES, MONOCYTES, EOSINOPHILS, BASOPHILS, RBCMORPHOLO in the last 72 hours.  Recent Labs      02/07/18   0347   SODIUM  135   POTASSIUM  4.5   CHLORIDE  107   CO2  23   GLUCOSE  111*   BUN  8     Recent Labs      02/07/18   0347   ALBUMIN  1.9*   CREATININE  0.65       Imaging:  No results found.    Micro:  Results     Procedure Component Value Units Date/Time    AFB CULTURE [[id number]] Collected:  01/31/18 1630    Order Status:  Completed Specimen:  Respirate from Bronchoalveolar Lavage Updated:  02/03/18 1217     Significant Indicator NEG     Source RESP     Site Bronchial Washings     AFB Culture Culture in progress.     AFB Smear Results No acid fast bacilli seen.    Narrative:       Collected By:[id number] GONDAL MUHAMMAD K  JEANE    CULTURE RESPIRATORY W/ GRM STN [[id number]]  (Abnormal) Collected:  01/31/18 1630    Order Status:  Completed Specimen:  Respirate from Bronchoalveolar Lavage Updated:  02/03/18 1217     Gram Stain Result --     Few WBCs.  Few Gram positive cocci.       Significant Indicator POS (POS)     Source RESP     Site Bronchial Washings     Respiratory Culture -- (A)     Growth noted after further incubation,see below for  organism  identification.       Respiratory Culture -- (A)     Staphylococcus aureus  Light growth  See previous culture for sensitivity report.      Narrative:       Collected By:[id number] GONDAL MUHAMMAD K  JEANE    FUNGAL CULTURE [[id number]] Collected:  01/31/18 1630    Order Status:  Completed Specimen:  Respirate from Bronchoalveolar Lavage Updated:  02/03/18 1217     Significant Indicator NEG     Source RESP     Site Bronchial Washings     Fungal Culture Culture in progress.    Narrative:       Collected By:[id number] VELIAAL ESPARZA K  JEANE    AFB [[id number]] Collected:  01/31/18 1630    Order Status:  Completed Specimen:  Respirate from Bronchoalveolar Lavage Updated:  02/03/18 1216     Significant Indicator NEG     Source RESP     Site Bronchoalveolar Lavage RUL     AFB Culture Culture in progress.     AFB Smear Results No acid fast bacilli seen.    Narrative:       Collected By:[id number] VELIAAL ESPARZA K  JEANE    FUNGAL CULTURE [[id number]] Collected:  01/31/18 1630    Order Status:  Completed Specimen:  Respirate from Bronchoalveolar Lavage Updated:  02/03/18 1216     Significant Indicator NEG     Source RESP     Site Bronchoalveolar Lavage RUL     Fungal Culture Culture in progress.    Narrative:       Collected By:[id number] GONDAL ESPARZA K  JEANE    QUANT BRONCHIAL WASHING [[id number]]  (Abnormal) Collected:  01/31/18 1630    Order Status:  Completed Specimen:  Respirate Updated:  02/03/18 1216     Gram Stain Result --     Rare WBCs.  Rare Gram positive cocci.  <5% intracellular organisms.       Significant Indicator POS (POS)     Source RESP     Site Bronchoalveolar Lavage RUL     Quantitative Bronch Washing -- (A)     Growth noted after further incubation,see below for  organism identification.       Quantitative Bronch Washing -- (A)     Staphylococcus aureus  10,000 cfu/mL  See previous culture for sensitivity report.      Narrative:       Collected By:[id number] VELIAAL ESPARZA K  JEANE    FUNGAL CULTURE [[id number]] Collected:   01/31/18 1630    Order Status:  Completed Specimen:  Respirate from Bronchoalveolar Lavage Updated:  02/03/18 1215     Significant Indicator NEG     Source RESP     Site Bronchoalveolar Lavage JEANE     Fungal Culture Culture in progress.    Narrative:       Collected By:[id number] GONDAL MUHAMMAD K  RUL  Collected By:[id number] GONDAL MUHAMMAD K  JEANE    AFB [[id number]] Collected:  01/31/18 1630    Order Status:  Completed Specimen:  Respirate from Bronchoalveolar Lavage Updated:  02/03/18 1215     Significant Indicator NEG     Source RESP     Site Bronchoalveolar Lavage JEANE     AFB Culture Culture in progress.     AFB Smear Results No acid fast bacilli seen.    Narrative:       Collected By:[id number] GONDAL MUHAMMAD K  RUL  Collected By:[id number] GONDAL MUHAMMAD K  JEANE    QUANT BRONCHIAL WASHING [[id number]]  (Abnormal)  (Susceptibility) Collected:  01/31/18 1630    Order Status:  Completed Specimen:  Respirate Updated:  02/03/18 1215     Gram Stain Result --     Many WBCs.  Moderate Gram positive cocci.  <5% intracellular organisms.       Significant Indicator POS (POS)     Source RESP     Site Bronchoalveolar Lavage JEANE     Quantitative Bronch Washing -- (A)     Growth noted after further incubation,see below for  organism identification.       Quantitative Bronch Washing -- (A)     Staphylococcus aureus  40,000 cfu/mL      Narrative:       Collected By:[id number] GONDAL MUHAMMAD K  RUL  Collected By:[id number] GONDAL MUHAMMAD K  JEANE    Culture & Susceptibility     STAPHYLOCOCCUS AUREUS     Antibiotic Sensitivity Microscan Unit Status    Ampicillin/sulbactam Sensitive <=8/4 mcg/mL Final    Clindamycin Sensitive <=0.5 mcg/mL Final    Erythromycin Sensitive <=0.5 mcg/mL Final    Moxifloxacin Sensitive <=0.5 mcg/mL Final    Oxacillin Sensitive <=0.25 mcg/mL Final    Tetracycline Sensitive <=4 mcg/mL Final    Trimeth/Sulfa Sensitive <=0.5/9.5 mcg/mL Final    Vancomycin Sensitive 2 mcg/mL Final                       BLOOD CULTURE [[id number]]  "Collected:  01/28/18 1908    Order Status:  Completed Specimen:  Blood from Peripheral Updated:  02/02/18 2100     Significant Indicator NEG     Source BLD     Site PERIPHERAL     Blood Culture No growth after 5 days of incubation.    Narrative:       Per Hospital Policy: Only change Specimen Src: to \"Line\" if  specified by physician order.    BLOOD CULTURE [[id number]] Collected:  01/28/18 1908    Order Status:  Completed Specimen:  Blood from Peripheral Updated:  02/02/18 2100     Significant Indicator NEG     Source BLD     Site PERIPHERAL     Blood Culture No growth after 5 days of incubation.    Narrative:       Per Hospital Policy: Only change Specimen Src: to \"Line\" if  specified by physician order.    AFB CULTURE [[id number]] Collected:  01/31/18 1630    Order Status:  Completed Specimen:  Respirate from Bronchoalveolar Lavage Updated:  02/02/18 1054     Significant Indicator NEG     Source RESP     Site Bronchial Brushings JEANE     AFB Culture Culture in progress.     AFB Smear Results No acid fast bacilli seen.    Narrative:       Collected By:[id number] GONDAL MUHAMMAD K RUL    RESP CULTURE [[id number]] Collected:  01/31/18 1630    Order Status:  Completed Specimen:  Respirate from Bronchoalveolar Lavage Updated:  02/02/18 1054     Gram Stain Result No organisms seen.     Significant Indicator NEG     Source RESP     Site Bronchial Brushings JEANE     Respiratory Culture No growth at 48 hours.    Narrative:       Collected By:[id number] GONDAL MUHAMMAD K RUL    FUNGAL CULTURE [[id number]] Collected:  01/31/18 1630    Order Status:  Completed Specimen:  Respirate from Bronchoalveolar Lavage Updated:  02/02/18 1054     Significant Indicator NEG     Source RESP     Site Bronchial Brushings JEANE     Fungal Culture Culture in progress.    Narrative:       Collected By:[id number] GONDAL MUHAMMAD K RUL          Assessment:  Active Hospital Problems    Diagnosis   • *Pneumonia [J18.9]   • Cavitary lesion of lung [J98.4]   • " Hyponatremia [E87.1]   • Hypokalemia [E87.6]   • Hypocalcemia [E83.51]   • Hypoalbuminemia [E88.09]   • Normocytic anemia [D64.9]   • Type II diabetes mellitus (CMS-HCC) [E11.9]       Plan:  Cavitary lung lesions  Low grade fever resolved  No leukocytosis  Blood cxs neg  HIV neg  Sputum +MSSA  S/p Bronch with BAL on 1/31  BAL gram stain +GPC, Cx - MSSA  AFB stains neg  Continue Unasyn and vori  Cocci serology +IgG, -IgM-change voriconazole to Diflucan high dose  Can do ertapenem 1 g daily. Aim  for at least 4 weeks of antibiotics through 2/28/2018  Needs long-term Diflucan and monitoring of his LFTs  Warrants repeat CT scan prior to discontinuing of antibiotics  [facility] report still pending from 2/5/2018        Diabetes, poorly controlled  Keep BS under 150 to help control current infection  Hgb A1C 12.5        Discussed with internal medicine

## 2018-02-09 NOTE — DISCHARGE INSTRUCTIONS
Discharge Instructions    Discharged to home by car with friend. Discharged via wheelchair, hospital escort: Yes.  Special equipment needed: Not Applicable    Be sure to schedule a follow-up appointment with your primary care doctor or any specialists as instructed.     Discharge Plan: Follow up for infusion tomorrow at [facility]   Follow up CT scan Before stopping antibiotics  Pneumococcal Vaccine Given - only chart on this line when given: Given (See MAR)  Influenza Vaccine Indication: Patient Refuses    I understand that a diet low in cholesterol, fat, and sodium is recommended for good health. Unless I have been given specific instructions below for another diet, I accept this instruction as my diet prescription.   Other diet: Diabetic    Special Instructions: None    · Is patient discharged on Warfarin / Coumadin?   No     Depression / Suicide Risk    As you are discharged from this [facility] facility, it is important to learn how to keep safe from harming yourself.    Recognize the warning signs:  · Abrupt changes in personality, positive or negative- including increase in energy   · Giving away possessions  · Change in eating patterns- significant weight changes-  positive or negative  · Change in sleeping patterns- unable to sleep or sleeping all the time   · Unwillingness or inability to communicate  · Depression  · Unusual sadness, discouragement and loneliness  · Talk of wanting to die  · Neglect of personal appearance   · Rebelliousness- reckless behavior  · Withdrawal from people/activities they love  · Confusion- inability to concentrate     If you or a loved one observes any of these behaviors or has concerns about self-harm, here's what you can do:  · Talk about it- your feelings and reasons for harming yourself  · Remove any means that you might use to hurt yourself (examples: pills, rope, extension cords, firearm)  · Get professional help from the community (Mental Health, Substance  Abuse, psychological counseling)  · Do not be alone:Call your Safe Contact- someone whom you trust who will be there for you.  · Call your local CRISIS HOTLINE 917-3255 or 013-271-5555  · Call your local Children's Mobile Crisis Response Team [place] (467) 262-5226 or www.SocialThreader  · Call the toll free National Suicide Prevention Hotlines   · National Suicide Prevention Lifeline 370-622-CNZS (4197)  · [place] [facility] Line Network 800-SUICIDE (492-7274)      Pneumonia, Adult  Pneumonia is an infection of the lungs.   CAUSES  Pneumonia may be caused by bacteria or a virus. Usually, the infection is caused by breathing in droplets from an infected person's cough or sneeze.   SYMPTOMS   Symptoms of pneumonia include:  · Cough.  · Fever.  · Chest pain.  · Rapid breathing.  · Shortness of breath.  · Shaking chills.  · Mucus production.  DIAGNOSIS   If you have the common symptoms of pneumonia, often your health care provider will confirm the diagnosis with a chest X-ray. The X-ray will show an abnormality in the lung if you have pneumonia. Other tests may be done on your blood, urine, or mucus (sputum) to find the specific cause of your pneumonia. A blood gas test or pulse oximetry test may be needed to check how well your lungs are working.  TREATMENT   Your treatment will depend on whether your pneumonia is caused by bacteria or a virus.   · Bacterial pneumonia is treated with antibiotic medicine.  · Pneumonia that is caused by the influenza virus may be treated with an antiviral medicine.  · Pneumonia that is caused by a virus other than influenza will not respond to antibiotic medicine. This type of pneumonia will have to run its course.   HOME CARE INSTRUCTIONS   · Cough suppressants may be used if you are losing too much rest from coughing at night. However, you should try to avoid taking cough suppresants. This is because coughing helps to remove mucus from your lungs.  · Sleep in a semi-upright  position at night. Try sleeping in a reclining chair, or place a few pillows under your head.  · Try using a cold steam vaporizer or humidifier in your home or bedroom. This may help loosen your mucus.  · If you were prescribed an antibiotic medicine, finish all of it even if you start to feel better.  · If you were prescribed an expectorant, take it as directed by your health care provider. This medicine loosens the mucus so you can cough it up.  · Take medicines only as directed by your health care provider.  · Do not smoke. If you are a smoker and continue to smoke, your cough may last several weeks after your pneumonia has cleared.  · Get rest when you feel tired, or as needed.  PREVENTION  A pneumococcal shot (vaccine) is available to prevent a common bacterial cause of pneumonia. This is usually suggested for:  · People over 65 years old.  · People on chemotherapy.  · People with chronic lung problems, such as bronchitis or emphysema.  · People with immune system problems.  If you are over 65 years old or have a high risk condition, you may receive the pneumococcal vaccine if you have not received it before. In some countries, a routine influenza vaccine is also recommended. This vaccine can help prevent some cases of pneumonia. You may be offered the influenza vaccine as part of your care.  If you are a smoker, it is time to quit in order to prevent pneumonia in the future. You may receive instructions on how to stop smoking. Your health care provider can provide medicines and counseling to help you quit.  SEEK MEDICAL CARE IF:  · You have a fever.  · You cannot control your cough with suppressants at night, and you keep losing sleep.  SEEK IMMEDIATE MEDICAL CARE IF:   · You have worsening shortness of breath.  · You have increased chest pain.  · Your sickness becomes worse, especially if you are an older adult or have a weakened immune system.  · You cough up blood.  · You have pain that is getting worse or  is not controlled with medicines.  · Your symptoms are getting worse rather than better.     This information is not intended to replace advice given to you by your health care provider. Make sure you discuss any questions you have with your health care provider.     Document Released: 12/18/2006 Document Revised: 01/08/2016 Document Reviewed: 04/13/2016  [email or web address] Interactive Patient Education ©2016 [employer]    Respiratory failure is when your lungs are not working well and your breathing (respiratory) system fails. When respiratory failure occurs, it is difficult for your lungs to get enough oxygen, get rid of carbon dioxide, or both. Respiratory failure can be life threatening.   Respiratory failure can be acute or chronic. Acute respiratory failure is sudden, severe, and requires emergency medical treatment. Chronic respiratory failure is less severe, happens over time, and requires ongoing treatment.   WHAT ARE THE CAUSES OF ACUTE RESPIRATORY FAILURE?   Any problem affecting the heart or lungs can cause acute respiratory failure. Some of these causes include the following:  · Chronic bronchitis and emphysema (COPD).    · Blood clot going to a lung (pulmonary embolism).    · Having water in the lungs caused by heart failure, lung injury, or infection (pulmonary edema).    · Collapsed lung (pneumothorax).    · Pneumonia.    · Pulmonary fibrosis.    · Obesity.    · Asthma.    · Heart failure.    · Any type of trauma to the chest that can make breathing difficult.    · Nerve or muscle diseases making chest movements difficult.  WHAT SYMPTOMS SHOULD YOU WATCH FOR?   If you have any of these signs or symptoms, you should seek immediate medical care:   · You have shortness of breath (dyspnea) with or without activity.    · You have rapid, fast breathing (tachypnea).    · You are wheezing.  · You are unable to say more than a few words without having to catch your breath.  · You find it very difficult to function  normally.  · You have a fast heart rate.    · You have a bluish color to your finger or toe nail beds.    · You have confusion or drowsiness or both.    HOW WILL MY ACUTE RESPIRATORY FAILURE BE TREATED?   Treatment of acute respiratory failure depends on the cause of the respiratory failure. Usually, you will stay in the intensive care unit so your breathing can be watched closely. Treatment can include the following:  · Oxygen. Oxygen can be delivered through the following:  ¨ Nasal cannula. This is small tubing that goes in your nose to give you oxygen.  ¨ Face mask. A face mask covers your nose and mouth to give you oxygen.  · Medicine. Different medicines can be given to help with breathing. These can include:  ¨ Nebulizers. Nebulizers deliver medicines to open the air passages (bronchodilators). These medicines help to open or relax the airways in the lungs so you can breathe better. They can also help loosen mucus from your lungs.  ¨ Diuretics. Diuretic medicines can help you breathe better by getting rid of extra water in your body.  ¨ Steroids. Steroid medicines can help decrease swelling (inflammation) in your lungs.  ¨ Antibiotics.  · Chest tube. If you have a collapsed lung (pneumothorax), a chest tube is placed to help reinflate the lung.  · Non-invasive positive pressure ventilation (NPPV). This is a tight-fitting mask that goes over your nose and mouth. The mask has tubing that is attached to a machine. The machine blows air into the tubing, which helps to keep the tiny air sacs (alveoli) in your lungs open. This machine allows you to breathe on your own.  · Ventilator. A ventilator is a breathing machine. When on a ventilator, a breathing tube is put into the lungs. A ventilator is used when you can no longer breathe well enough on your own. You may have low oxygen levels or high carbon dioxide (CO2) levels in your blood. When you are on a ventilator, sedation and pain medicines are given to make you  sleep so your lungs can heal.     This information is not intended to replace advice given to you by your health care provider. Make sure you discuss any questions you have with your health care provider.     Document Released: 12/23/2014 Document Revised: 01/08/2016 Document Reviewed: 12/23/2014  [employer] Interactive Patient Education ©2016 [employer]      Pulmonary Nodule  A pulmonary nodule is a small, round growth of tissue in the lung. Pulmonary nodules can range in size from less than 1/5 inch (4 mm) to a little bigger than an inch (25 mm). Most pulmonary nodules are detected when imaging tests of the lung are being performed for a different problem. Pulmonary nodules are usually not cancerous (benign). However, some pulmonary nodules are cancerous (malignant). Follow-up treatment or testing is based on the size of the pulmonary nodule and your risk of getting lung cancer.   CAUSES  Benign pulmonary nodules can be caused by various things. Some of the causes include:   · Bacterial, fungal, or viral infections. This is usually an old infection that is no longer active, but it can sometimes be a current, active infection.  · A benign mass of tissue.  · Inflammation from conditions such as rheumatoid arthritis.    · Abnormal blood vessels in the lungs.  Malignant pulmonary nodules can result from lung cancer or from cancers that spread to the lung from other places in the body.  SIGNS AND SYMPTOMS  Pulmonary nodules usually do not cause symptoms.  DIAGNOSIS  Most often, pulmonary nodules are found incidentally when an X-ray or CT scan is performed to look for some other problem in the lung area. To help determine whether a pulmonary nodule is benign or malignant, your health care provider will take a medical history and order a variety of tests. Tests done may include:   · Blood tests.  · A skin test called a tuberculin test. This test is used to determine if you have been exposed to the germ that causes  tuberculosis.    · Chest X-rays. If possible, a new X-ray may be compared with X-rays you have had in the past.     · CT scan. This test shows smaller pulmonary nodules more clearly than an X-ray.    · Positron emission tomography (PET) scan. In this test, a safe amount of a radioactive substance is injected into the bloodstream. Then, the scan takes a picture of the pulmonary nodule. The radioactive substance is eliminated from your body in your urine.    · Biopsy. A tiny piece of the pulmonary nodule is removed so it can be checked under a microscope.  TREATMENT   Pulmonary nodules that are benign normally do not require any treatment because they usually do not cause symptoms or breathing problems. Your health care provider may want to monitor the pulmonary nodule through follow-up CT scans. The frequency of these CT scans will vary based on the size of the nodule and the risk factors for lung cancer. For example, CT scans will need to be done more frequently if the pulmonary nodule is larger and if you have a history of smoking and a family history of cancer. Further testing or biopsies may be done if any follow-up CT scan shows that the size of the pulmonary nodule has increased.  HOME CARE INSTRUCTIONS  · Only take over-the-counter or prescription medicines as directed by your health care provider.  · Keep all follow-up appointments with your health care provider.  SEEK MEDICAL CARE IF:  · You have trouble breathing when you are active.    · You feel sick or unusually tired.    · You do not feel like eating.    · You lose weight without trying to.    · You develop chills or night sweats.    SEEK IMMEDIATE MEDICAL CARE IF:  · You cannot catch your breath, or you begin wheezing.    · You cannot stop coughing.    · You cough up blood.    · You become dizzy or feel like you are going to pass out.    · You have sudden chest pain.    · You have a fever or persistent symptoms for more than 2-3 days.    · You have a  fever and your symptoms suddenly get worse.  MAKE SURE YOU:  · Understand these instructions.  · Will watch your condition.  · Will get help right away if you are not doing well or get worse.     This information is not intended to replace advice given to you by your health care provider. Make sure you discuss any questions you have with your health care provider.     Document Released: 10/15/2010 Document Revised: 08/20/2014 Document Reviewed: 06/09/2014  [employer] Interactive Patient Education ©2016 Elsevier Inc.    Anemia, Nonspecific  Anemia is a condition in which the concentration of red blood cells or hemoglobin in the blood is below normal. Hemoglobin is a substance in red blood cells that carries oxygen to the tissues of the body. Anemia results in not enough oxygen reaching these tissues.   CAUSES   Common causes of anemia include:   · Excessive bleeding. Bleeding may be internal or external. This includes excessive bleeding from periods (in women) or from the intestine.    · Poor nutrition.    · Chronic kidney, thyroid, and liver disease.   · Bone marrow disorders that decrease red blood cell production.  · Cancer and treatments for cancer.  · HIV, AIDS, and their treatments.  · Spleen problems that increase red blood cell destruction.  · Blood disorders.  · Excess destruction of red blood cells due to infection, medicines, and autoimmune disorders.  SIGNS AND SYMPTOMS   · Minor weakness.    · Dizziness.    · Headache.  · Palpitations.    · Shortness of breath, especially with exercise.    · Paleness.  · Cold sensitivity.  · Indigestion.  · Nausea.  · Difficulty sleeping.  · Difficulty concentrating.  Symptoms may occur suddenly or they may develop slowly.   DIAGNOSIS   Additional blood tests are often needed. These help your health care provider determine the best treatment. Your health care provider will check your stool for blood and look for other causes of blood loss.   TREATMENT   Treatment varies  depending on the cause of the anemia. Treatment can include:   · Supplements of iron, vitamin B12, or folic acid.    · Hormone medicines.    · A blood transfusion. This may be needed if blood loss is severe.    · Hospitalization. This may be needed if there is significant continual blood loss.    · Dietary changes.  · Spleen removal.  HOME CARE INSTRUCTIONS  Keep all follow-up appointments. It often takes many weeks to correct anemia, and having your health care provider check on your condition and your response to treatment is very important.  SEEK IMMEDIATE MEDICAL CARE IF:   · You develop extreme weakness, shortness of breath, or chest pain.    · You become dizzy or have trouble concentrating.  · You develop heavy vaginal bleeding.    · You develop a rash.    · You have bloody or black, tarry stools.    · You faint.    · You vomit up blood.    · You vomit repeatedly.    · You have abdominal pain.  · You have a fever or persistent symptoms for more than 2-3 days.    · You have a fever and your symptoms suddenly get worse.    · You are dehydrated.    MAKE SURE YOU:  · Understand these instructions.  · Will watch your condition.  · Will get help right away if you are not doing well or get worse.     This information is not intended to replace advice given to you by your health care provider. Make sure you discuss any questions you have with your health care provider.     Document Released: 01/25/2006 Document Revised: 08/20/2014 Document Reviewed: 06/13/2014  [employer] Interactive Patient Education ©2016 [employer]    Staphylococcal Infection  WHAT IS A STAPHYLOCOCCAL INFECTION?  Staphylococcus aureus, also known as staph, is a bacteria that can cause infections. The most common type of staph infection is a skin infection. Staph can spread easily from one person to another.  HOW IS A STAPHYLOCOCCAL INFECTION DIAGNOSED?  Your health care provider may diagnose and treat a staph infection based on an exam. He or she  may also do a culture from the affected area:  · To find out exactly what type of bacteria is causing your infection.  · To decide which medicine is best to treat it.  HOW IS A STAPHYLOCOCCAL INFECTION TREATED?  Most staph infections can be easily treated with antibiotic medicine. Some infections must be drained. More serious types of staph, including methicillin-resistant Staphylococcus aureus (MRSA) may be more difficult to treat. They may require a strong antibiotic or more than one antibiotic.  WHAT SHOULD I DO AT HOME?  · Take your antibiotic as directed by your health care provider. Finish your antibiotic even if you start to feel better.  · Keep all follow-up visits as directed by your health care provider.  · Tell all of your health care providers including dentists that you have a staph infection, especially if you have been diagnosed with MRSA.  · Ask your health care provider when it is safe for you to return to school or work.  · To prevent spreading the infection:  ¨ Keep infections and pus or drainage material covered with clean, dry bandages (dressings), or as directed by your health care provider.  ¨ Wash your hands with soap and water often, especially after changing dressings or touching your infection.  ¨ Advise your family and other close contacts to wash their hands frequently with soap and water. They should always wash their hands after changing your dressings, touching the infected area, or touching infected materials.  ¨ Avoid sharing personal items that may have had contact with your infection. These include towels, washcloths, razors, clothing, and uniforms.  ¨ Wash your linens and clothes with hot water and laundry detergent. Drying clothes in a hot dryer--rather than air-drying--also helps to kill bacteria in clothes.  WHEN SHOULD I CONTACT MY HEALTH CARE PROVIDER?  Although they are usually easy to treat, some staph infections can become very serious. You should contact your health care  "provider if your infection gets worse or if you have a fever.     This information is not intended to replace advice given to you by your health care provider. Make sure you discuss any questions you have with your health care provider.     Document Released: 01/08/2016 Document Reviewed: 01/08/2016  [employer] Interactive Patient Education ©2016 [employer]    PICC Home Guide  A peripherally inserted central catheter (PICC) is a long, thin, flexible tube that is inserted into a vein in the upper arm. It is a form of intravenous (IV) access. It is considered to be a \"central\" line because the tip of the PICC ends in a large vein in your chest. This large vein is called the superior vena cava (SVC). The PICC tip ends in the SVC because there is a lot of blood flow in the SVC. This allows medicines and IV fluids to be quickly distributed throughout the body. The PICC is inserted using a sterile technique by a specially trained nurse or physician. After the PICC is inserted, a chest X-ray exam is done to be sure it is in the correct place.   A PICC may be placed for different reasons, such as:  · To give medicines and liquid nutrition that can only be given through a central line. Examples are:  ¨ Certain antibiotic treatments.  ¨ Chemotherapy.  ¨ Total parenteral nutrition (TPN).  · To take frequent blood samples.  · To give IV fluids and blood products.  · If there is difficulty placing a peripheral intravenous (PIV) catheter.  If taken care of properly, a PICC can remain in place for several months. A PICC can also allow a person to go home from the hospital early. Medicine and PICC care can be managed at home by a family member or home health care team.  WHAT PROBLEMS CAN HAPPEN WHEN I HAVE A PICC?  Problems with a PICC can occasionally occur. These may include the following:  · A blood clot (thrombus) forming in or at the tip of the PICC. This can cause the PICC to become clogged. A clot-dissolving medicine called " "tissue plasminogen activator (tPA) can be given through the PICC to help break up the clot.  · Inflammation of the vein (phlebitis) in which the PICC is placed. Signs of inflammation may include redness, pain at the insertion site, red streaks, or being able to feel a \"cord\" in the vein where the PICC is located.  · Infection in the PICC or at the insertion site. Signs of infection may include fever, chills, redness, swelling, or pus drainage from the PICC insertion site.  · PICC movement (malposition). The PICC tip may move from its original position due to excessive physical activity, forceful coughing, sneezing, or vomiting.  · A break or cut in the PICC. It is important to not use scissors near the PICC.  · Nerve or tendon irritation or injury during PICC insertion.  WHAT SHOULD I KEEP IN MIND ABOUT ACTIVITIES WHEN I HAVE A PICC?  · You may bend your arm and move it freely. If your PICC is near or at the bend of your elbow, avoid activity with repeated motion at the elbow.  · Rest at home for the remainder of the day following PICC line insertion.  · Avoid lifting heavy objects as instructed by your health care provider.  · Avoid using a crutch with the arm on the same side as your PICC. You may need to use a walker.  WHAT SHOULD I KNOW ABOUT MY PICC DRESSING?  · Keep your PICC bandage (dressing) clean and dry to prevent infection.  ¨ Ask your health care provider when you may shower. Ask your health care provider to teach you how to wrap the PICC when you do take a shower.  · Change the PICC dressing as instructed by your health care provider.  · Change your PICC dressing if it becomes loose or wet.  WHAT SHOULD I KNOW ABOUT PICC CARE?  · Check the PICC insertion site daily for leakage, redness, swelling, or pain.  · Do not take a bath, swim, or use hot tubs when you have a PICC. Cover PICC line with clear plastic wrap and tape to keep it dry while showering.  · Flush the PICC as directed by your health care " "provider. Let your health care provider know right away if the PICC is difficult to flush or does not flush. Do not use force to flush the PICC.  · Do not use a syringe that is less than 10 mL to flush the PICC.  · Never pull or tug on the PICC.  · Avoid blood pressure checks on the arm with the PICC.  · Keep your PICC identification card with you at all times.  · Do not take the PICC out yourself. Only a trained clinical professional should remove the PICC.  SEEK IMMEDIATE MEDICAL CARE IF:  · Your PICC is accidentally pulled all the way out. If this happens, cover the insertion site with a bandage or gauze dressing. Do not throw the PICC away. Your health care provider will need to inspect it.  · Your PICC was tugged or pulled and has partially come out. Do not  push the PICC back in.  · There is any type of drainage, redness, or swelling where the PICC enters the skin.  · You cannot flush the PICC, it is difficult to flush, or the PICC leaks around the insertion site when it is flushed.  · You hear a \"flushing\" sound when the PICC is flushed.  · You have pain, discomfort, or numbness in your arm, shoulder, or jaw on the same side as the PICC.  · You feel your heart \"racing\" or skipping beats.  · You notice a hole or tear in the PICC.  · You develop chills or a fever.  MAKE SURE YOU:   · Understand these instructions.  · Will watch your condition.  · Will get help right away if you are not doing well or get worse.     This information is not intended to replace advice given to you by your health care provider. Make sure you discuss any questions you have with your health care provider.     Document Released: 06/23/2004 Document Revised: 01/08/2016 Document Reviewed: 08/25/2014  [employer] Interactive Patient Education ©2016 [employer]    PICC Insertion, Care After  Refer to this sheet in the next few weeks. These instructions provide you with information on caring for yourself after your procedure. Your health " care provider may also give you more specific instructions. Your treatment has been planned according to current medical practices, but problems sometimes occur. Call your health care provider if you have any problems or questions after your procedure.  WHAT TO EXPECT AFTER THE PROCEDURE  After your procedure, it is typical to have the following:  · Mild discomfort at the insertion site. This should not last more than a day.  HOME CARE INSTRUCTIONS  · Rest at home for the remainder of the day after the procedure.  · You may bend your arm and move it freely. If your PICC is near or at the bend of your elbow, avoid activity with repeated motion at the elbow.  · Avoid lifting heavy objects as instructed by your health care provider.  · Avoid using a crutch with the arm on the same side as your PICC. You may need to use a walker.  Bandage Care  · Keep your PICC bandage (dressing) clean and dry to prevent infection.  · Ask your health care provider when you may shower. To keep the dressing dry, cover the PICC with plastic wrap and tape before showering. If the dressing does become wet, replace it right after the shower.  · Do not soak in the bath, swim, or use hot tubs when you have a PICC.  · Change the PICC dressing as instructed by your health care provider.  · Change your PICC dressing if it becomes loose or wet.  General PICC Care  · Check the PICC insertion site daily for leakage, redness, swelling, or pain.  · Flush the PICC as directed by your health care provider. Let your health care provider know right away if the PICC is difficult to flush or does not flush. Do not use force to flush the PICC.  · Do not use a syringe that is less than 10 mL to flush the PICC.  · Never pull or tug on the PICC.  · Avoid blood pressure checks on the arm with the PICC.  · Keep your PICC identification card with you at all times.  · Do not take the PICC out yourself. Only a trained health care professional should remove the  "PICC.  SEEK MEDICAL CARE IF:  · You have pain in your arm, ear, face, or teeth.  · You have fever or chills.  · You have drainage from the PICC insertion site.  · You have redness or palpate a \"cord\" around the PICC insertion site.  · You cannot flush the catheter.  SEEK IMMEDIATE MEDICAL CARE IF:  · You have swelling in the arm in which the PICC is inserted.     This information is not intended to replace advice given to you by your health care provider. Make sure you discuss any questions you have with your health care provider.     Document Released: 10/08/2014 Document Revised: 12/23/2014 Document Reviewed: 10/08/2014  [employer] Interactive Patient Education ©2016 [employer]    Hypocalcemia, Adult  Hypocalcemia is low blood calcium. Calcium is important for cells to function in the body. Low blood calcium can cause a variety of symptoms and problems.  CAUSES   · Low levels of a body protein called albumin.  · Problems with the parathyroid glands or surgical removal of the parathyroid glands. The parathyroid glands maintain the body's level of calcium.  · Decreased production or improper use of parathyroid hormone.  · Lack (deficiency) of vitamin D or magnesium or both.  · Intestinal problems that interfere with nutrient absorption.  · Alcoholism.  · Kidney problems.  · Inflammation of the pancreas (pancreatitis).  · Certain medicines.  · Severe infections (sepsis).  · Infiltrative diseases. With these diseases the parathyroid glands are filled with cells or substances that are not normally present. Examples include:  ¨ Sarcoidosis.  ¨ Hemachromatosis.  · Breakdown of large amounts of muscle fiber.  · High levels of phosphate in the body.  · Cancer.  · Massive blood transfusions which usually occur with severe trauma.  SYMPTOMS   · Numbness and tingling in the fingers, toes, or around the mouth.  · Muscle aches or cramps, especially in the legs, feet, and back.  · Muscle twitches.  · Shortness of breath or " wheezing.  · Difficulty swallowing.  · Changes in the sound of the voice.  · General weakness.  · Fainting.  · Fast heart beats (palpitations).  · Chest pain.  · Irritability.  · Difficulty thinking.  · Memory problems or confusion.  · Severe fatigue.  · Changes in personality.  · Depression and anxiety.  · Shaking uncontrollably (seizures).  · Coarse, brittle hair and nails.  · Dry skin or lasting (chronic) skin diseases (psoriasis, eczema, or dermatitis).  · Clouding of the eye lens (cataracts).  · Abdominal cramping or pain.  DIAGNOSIS   Hypocalcemia is usually diagnosed through blood tests that reveal a low level of blood calcium. Other tests, such as a recording of the electrical activity of the heart (electrocardiogram, EKG), may be performed in order to diagnose the underlying cause of the condition.  TREATMENT   Treatment for hypocalcemia includes giving calcium supplements. These can be given by mouth or by intravenous (IV) access tube, depending on the severity of the symptoms and deficiency. Other minerals (electrolytes), such as magnesium, may also be given.  HOME CARE INSTRUCTIONS   · Meet with a dietitian to make sure you are eating the most healthful diet possible, or follow diet instructions as directed by your caregiver.  · Follow up with your caregiver as directed.  SEEK IMMEDIATE MEDICAL CARE IF:   · You develop chest pain.  · You develop persistent rapid or irregular heartbeats.  · You have difficulty breathing.  · You faint.  · You develop increased fatigue.  · You have new swelling in the feet, ankles, or legs.  · You develop increased muscle twitching.  · You start to have seizures.  · You develop confusion.  · You develop mood, memory, or personality changes.  MAKE SURE YOU:   · Understand these instructions.  · Will watch your condition.  · Will get help right away if you are not doing well or get worse.     This information is not intended to replace advice given to you by your health care  provider. Make sure you discuss any questions you have with your health care provider.     Document Released: 06/07/2011 Document Revised: 03/11/2013 Document Reviewed: 06/07/2011  [employer] Interactive Patient Education ©2016 [employer]    Hypokalemia  Hypokalemia means that the amount of potassium in the blood is lower than normal. Potassium is a chemical, called an electrolyte, that helps regulate the amount of fluid in the body. It also stimulates muscle contraction and helps nerves function properly. Most of the body's potassium is inside of cells, and only a very small amount is in the blood. Because the amount in the blood is so small, minor changes can be life-threatening.  CAUSES  · Antibiotics.  · Diarrhea or vomiting.  · Using laxatives too much, which can cause diarrhea.  · Chronic kidney disease.  · Water pills (diuretics).  · Eating disorders (bulimia).  · Low magnesium level.  · Sweating a lot.  SIGNS AND SYMPTOMS  · Weakness.  · Constipation.  · Fatigue.  · Muscle cramps.  · Mental confusion.  · Skipped heartbeats or irregular heartbeat (palpitations).  · Tingling or numbness.  DIAGNOSIS   Your health care provider can diagnose hypokalemia with blood tests. In addition to checking your potassium level, your health care provider may also check other lab tests.  TREATMENT  Hypokalemia can be treated with potassium supplements taken by mouth or adjustments in your current medicines. If your potassium level is very low, you may need to get potassium through a vein (IV) and be monitored in the hospital. A diet high in potassium is also helpful. Foods high in potassium are:  · Nuts, such as peanuts and pistachios.  · Seeds, such as sunflower seeds and pumpkin seeds.  · Peas, lentils, and lima beans.  · Whole grain and bran cereals and breads.  · Fresh fruit and vegetables, such as apricots, avocado, bananas, cantaloupe, kiwi, oranges, tomatoes, asparagus, and potatoes.  · Orange and tomato juices.  · Red  meats.  · Fruit yogurt.  HOME CARE INSTRUCTIONS  · Take all medicines as prescribed by your health care provider.  · Maintain a healthy diet by including nutritious food, such as fruits, vegetables, nuts, whole grains, and lean meats.  · If you are taking a laxative, be sure to follow the directions on the label.  SEEK MEDICAL CARE IF:  · Your weakness gets worse.  · You feel your heart pounding or racing.  · You are vomiting or having diarrhea.  · You are diabetic and having trouble keeping your blood glucose in the normal range.  SEEK IMMEDIATE MEDICAL CARE IF:  · You have chest pain, shortness of breath, or dizziness.  · You are vomiting or having diarrhea for more than 2 days.  · You faint.  MAKE SURE YOU:   · Understand these instructions.  · Will watch your condition.  · Will get help right away if you are not doing well or get worse.     This information is not intended to replace advice given to you by your health care provider. Make sure you discuss any questions you have with your health care provider.     Document Released: 12/18/2006 Document Revised: 01/08/2016 Document Reviewed: 06/20/2014  [employer] Interactive Patient Education ©2016 [employer]    Hyponatremia  Hyponatremia is when the amount of salt (sodium) in your blood is too low. When sodium levels are low, your cells absorb extra water and they swell. The swelling happens throughout the body, but it mostly affects the brain.  CAUSES  This condition may be caused by:  · Heart, kidney, or liver problems.  · Thyroid problems.  · Adrenal gland problems.  · Metabolic conditions, such as syndrome of inappropriate antidiuretic hormone (SIADH).  · Severe vomiting and diarrhea.  · Certain medicines or illegal drugs.  · Dehydration.  · Drinking too much water.  · Eating a diet that is low in sodium.  · Large burns on your body.  · Sweating.  RISK FACTORS  This condition is more likely to develop in people who:  · Have long-term (chronic) kidney  disease.  · Have heart failure.  · Have a medical condition that causes frequent or excessive diarrhea.  · Have metabolic conditions, such as Adonis disease or SIADH.  · Take certain medicines that affect the sodium and fluid balance in the blood. Some of these medicine types include:  ¨ Diuretics.  ¨ NSAIDs.  ¨ Some opioid pain medicines.  ¨ Some antidepressants.  ¨ Some seizure prevention medicines.  SYMPTOMS   Symptoms of this condition include:  · Nausea and vomiting.  · Confusion.  · Lethargy.  · Agitation.  · Headache.  · Seizures.  · Unconsciousness.  · Appetite loss.  · Muscle weakness and cramping.  · Feeling weak or light-headed.  · Having a rapid heart rate.  · Fainting, in severe cases.  DIAGNOSIS  This condition is diagnosed with a medical history and physical exam. You will also have other tests, including:  · Blood tests.  · Urine tests.  TREATMENT  Treatment for this condition depends on the cause. Treatment may include:  · Fluids given through an IV tube that is inserted into one of your veins.  · Medicines to correct the sodium imbalance. If medicines are causing the condition, the medicines will need to be adjusted.  · Limiting water or fluid intake to get the correct sodium balance.  HOME CARE INSTRUCTIONS  · Take medicines only as directed by your health care provider. Many medicines can make this condition worse. Talk with your health care provider about any medicines that you are currently taking.  · Carefully follow a recommended diet as directed by your health care provider.  · Carefully follow instructions from your health care provider about fluid restrictions.  · Keep all follow-up visits as directed by your health care provider. This is important.  · Do not drink alcohol.  SEEK MEDICAL CARE IF:  · You develop worsening nausea, fatigue, headache, confusion, or weakness.  · Your symptoms go away and then return.  · You have problems following the recommended diet.  SEEK IMMEDIATE MEDICAL  CARE IF:  · You have a seizure.  · You faint.  · You have ongoing diarrhea or vomiting.     This information is not intended to replace advice given to you by your health care provider. Make sure you discuss any questions you have with your health care provider.     Document Released: 12/08/2003 Document Revised: 05/03/2016 Document Reviewed: 01/07/2016  [employer] Interactive Patient Education ©2016 [employer]  Fluconazole tablets  What is this medicine?  FLUCONAZOLE (floo TOM na zole) is an antifungal medicine. It is used to treat certain kinds of fungal or yeast infections.  This medicine may be used for other purposes; ask your health care provider or pharmacist if you have questions.  COMMON BRAND NAME(S): Diflucan  What should I tell my health care provider before I take this medicine?  They need to know if you have any of these conditions:  -electrolyte abnormalities  -history of irregular heart beat  -kidney disease  -an unusual or allergic reaction to fluconazole, other azole antifungals, medicines, foods, dyes, or preservatives  -pregnant or trying to get pregnant  -breast-feeding  How should I use this medicine?  Take this medicine by mouth. Follow the directions on the prescription label. Do not take your medicine more often than directed.  Talk to your pediatrician regarding the use of this medicine in children. Special care may be needed. This medicine has been used in children as young as 6 months of age.  Overdosage: If you think you have taken too much of this medicine contact a poison control center or emergency room at once.  NOTE: This medicine is only for you. Do not share this medicine with others.  What if I miss a dose?  If you miss a dose, take it as soon as you can. If it is almost time for your next dose, take only that dose. Do not take double or extra doses.  What may interact with this medicine?  Do not take this medicine with any of the following  medications:  -cisapride  -pimozide  -red yeast rice  This medicine may also interact with the following medications:  -birth control pills  -cyclosporine  -diuretics like hydrochlorothiazide  -medicines for diabetes that are taken by mouth  -medicines for high cholesterol like atorvastatin, lovastatin or simvastatin  -phenytoin  -ramelteon  -rifabutin  -rifampin  -some medicines for anxiety or sleep  -tacrolimus  -terfenadine  -theophylline  -tofacitinib  -warfarin  This list may not describe all possible interactions. Give your health care provider a list of all the medicines, herbs, non-prescription drugs, or dietary supplements you use. Also tell them if you smoke, drink alcohol, or use illegal drugs. Some items may interact with your medicine.  What should I watch for while using this medicine?  Visit your doctor or health care professional for regular checkups. If you are taking this medicine for a long time you may need blood work. Tell your doctor if your symptoms do not improve. Some fungal infections need many weeks or months of treatment to cure.  Alcohol can increase possible damage to your liver. Avoid alcoholic drinks.  If you have a vaginal infection, do not have sex until you have finished your treatment. You can wear a sanitary napkin. Do not use tampons. Wear freshly washed cotton, not synthetic, panties.  What side effects may I notice from receiving this medicine?  Side effects that you should report to your doctor or health care professional as soon as possible:  -allergic reactions like skin rash or itching, hives, swelling of the lips, mouth, tongue, or throat  -dark urine  -feeling dizzy or faint  -irregular heartbeat or chest pain  -redness, blistering, peeling or loosening of the skin, including inside the mouth  -trouble breathing  -unusual bruising or bleeding  -vomiting  -yellowing of the eyes or skin  Side effects that usually do not require medical attention (report to your doctor or  health care professional if they continue or are bothersome):  -changes in how food tastes  -diarrhea  -headache  -stomach upset or nausea  This list may not describe all possible side effects. Call your doctor for medical advice about side effects. You may report side effects to FDA at 7-444-SAT-5121.  Where should I keep my medicine?  Keep out of the reach of children.  Store at room temperature below 30 degrees C (86 degrees F). Throw away any medicine after the expiration date.  NOTE: This sheet is a summary. It may not cover all possible information. If you have questions about this medicine, talk to your doctor, pharmacist, or health care provider.  © 2014, [employer]. (9/17/2013 3:15:11 PM)    Guaifenesin oral ER tablets  What is this medicine?  GUAIFENESIN (gwye FEN e sin) is an expectorant. It helps to thin mucous and make coughs more productive. This medicine is used to treat coughs caused by colds or the flu. It is not intended to treat chronic cough caused by smoking, asthma, emphysema, or heart failure.  This medicine may be used for other purposes; ask your health care provider or pharmacist if you have questions.  COMMON BRAND NAME(S): Humibid, Mucinex  What should I tell my health care provider before I take this medicine?  They need to know if you have any of these conditions:  -fever  -kidney disease  -an unusual or allergic reaction to guaifenesin, other medicines, foods, dyes, or preservatives  -pregnant or trying to get pregnant  -breast-feeding  How should I use this medicine?  Take this medicine by mouth with a full glass of water. Follow the directions on the prescription label. Do not break, chew or crush this medicine. You may take with food or on an empty stomach. Take your medicine at regular intervals. Do not take your medicine more often than directed.  Talk to your pediatrician regarding the use of this medicine in children. While this drug may be prescribed for children as  young as 12 years old for selected conditions, precautions do apply.  Overdosage: If you think you have taken too much of this medicine contact a poison control center or emergency room at once.  NOTE: This medicine is only for you. Do not share this medicine with others.  What if I miss a dose?  If you miss a dose, take it as soon as you can. If it is almost time for your next dose, take only that dose. Do not take double or extra doses.  What may interact with this medicine?  Interactions are not expected.  This list may not describe all possible interactions. Give your health care provider a list of all the medicines, herbs, non-prescription drugs, or dietary supplements you use. Also tell them if you smoke, drink alcohol, or use illegal drugs. Some items may interact with your medicine.  What should I watch for while using this medicine?  Do not treat a cough for more than 1 week without consulting your doctor or health care professional. If you also have a high fever, skin rash, continuing headache, or sore throat, see your doctor.  For best results, drink 6 to 8 glasses water daily while you are taking this medicine.  What side effects may I notice from receiving this medicine?  Side effects that you should report to your doctor or health care professional as soon as possible:  -allergic reactions like skin rash, itching or hives, swelling of the face, lips, or tongue  Side effects that usually do not require medical attention (report to your doctor or health care professional if they continue or are bothersome):  -dizziness  -headache  -stomach upset  This list may not describe all possible side effects. Call your doctor for medical advice about side effects. You may report side effects to FDA at 0-860-FDA-9006.  Where should I keep my medicine?  Keep out of the reach of children.  Store at room temperature between 20 and 25 degrees C (68 and 77 degrees F). Keep container tightly closed. Throw away any unused  medicine after the expiration date.  NOTE: This sheet is a summary. It may not cover all possible information. If you have questions about this medicine, talk to your doctor, pharmacist, or health care provider.  © 2014, [employer]. (4/29/2009 12:14:14 PM)    Ertapenem injection  What is this medicine?  ERTAPENEM (er ta PEN em) is a carbapenem antibiotic. It is used to treat certain kinds of bacterial infections. It will not work for colds, flu, or other viral infections.  This medicine may be used for other purposes; ask your health care provider or pharmacist if you have questions.  COMMON BRAND NAME(S): Invanz  What should I tell my health care provider before I take this medicine?  They need to know if you have any of these conditions:  -brain tumor, lesion  -kidney disease  -seizure disorder  -an unusual or allergic reaction to ertapenem, other antibiotics, amide local anesthetics like lidocaine, or other medicines, foods, dyes, or preservatives  -pregnant or trying to get pregnant  -breast-feeding  How should I use this medicine?  This medicine is infused into a vein or injected deep into a muscle. It is usually given by a health care professional in a hospital or clinic setting.  If you get this medicine at home, you will be taught how to prepare and give this medicine. Use exactly as directed. Take your medicine at regular intervals. Do not take your medicine more often than directed.  It is important that you put your used needles and syringes in a special sharps container. Do not put them in a trash can. If you do not have a sharps container, call your pharmacist or healthcare provider to get one.  Talk to your pediatrician regarding the use of this medicine in children. While this drug may be prescribed for children as young as 3 months old for selected conditions, precautions do apply.  Overdosage: If you think you have taken too much of this medicine contact a poison control center or  emergency room at once.  NOTE: This medicine is only for you. Do not share this medicine with others.  What if I miss a dose?  If you miss a dose, take it as soon as you can. If it is almost time for your next dose, take only that dose. Do not take double or extra doses.  What may interact with this medicine?  -probenecid  This list may not describe all possible interactions. Give your health care provider a list of all the medicines, herbs, non-prescription drugs, or dietary supplements you use. Also tell them if you smoke, drink alcohol, or use illegal drugs. Some items may interact with your medicine.  What should I watch for while using this medicine?  Tell your doctor or health care professional if your symptoms do not improve or if you get new symptoms. Your doctor will monitor your condition and blood work as needed.  Do not treat diarrhea with over the counter products. Contact your doctor if you have diarrhea that lasts more than 2 days or if it is severe and watery.  What side effects may I notice from receiving this medicine?  Side effects that you should report to your doctor or health care professional as soon as possible:  -allergic reactions like skin rash, itching or hives, swelling of the face, lips, or tongue  -anxiety, confusion, dizzy  -chest pain  -difficulty breathing, wheezing  -edema, swelling  -fever  -irregular heart rate, blood pressure  -pain or difficulty passing urine  -seizures  -unusually weak or tired  -white or red patches in mouth  Side effects that usually do not require medical attention (report to your doctor or health care professional if they continue or are bothersome):  -constipation or diarrhea  -difficulty sleeping  -headache  -nausea, vomiting  -pain, swelling or irritation where injected  -stomach upset  -vaginal itch, irritation  This list may not describe all possible side effects. Call your doctor for medical advice about side effects. You may report side effects to  FDA at 4-720-GGK-1088.  Where should I keep my medicine?  Keep out of the reach of children.  You will be instructed on how to store this medicine. Throw away any unused medicine after the expiration date on the label.  NOTE: This sheet is a summary. It may not cover all possible information. If you have questions about this medicine, talk to your doctor, pharmacist, or health care provider.  © 2014, [employer]. (4/20/2009 5:13:29 PM)

## 2018-02-09 NOTE — PROGRESS NOTES
Pt discharged home via wheelchair with family. All dc instructions reviewed. All questions answered.  Verbalized understanding. PICC remains in place for outpt infusions. All belongings with pt when leaving unit.

## 2018-02-10 NOTE — DISCHARGE SUMMARY
CHIEF COMPLAINT ON ADMISSION  Fever, chills, cough    CODE STATUS  Prior    HPI & HOSPITAL COURSE  57 y.o. male admitted 1/28/2018 with cavitary pneumonia. Subsequent cultures show coccidiomycosis and MSSA. ID and pulmonary are following. He had diagnostic bronchoscopy on 1/31 by Dr. Muhammad Gondal-cultures grew MSSA, he had positive IgG for coccidiomycosis as well, treated initially with voriconazole-subsequently changed to diflucan.  He was managed with Unasyn for the majority of his stay, prior to discharge he was given a dose of ertapenem which was tolerated well. AFB studies were negative. He improved steadily in terms of symptoms and shortness of breath throughout his stay and was anxious for discharge back to [place]. In order to coordinate with primary care physician follow-up, he was discharged early in the morning to allow him to see his primary care doctor later in the day, so that the primary care doctor could order subsequent IV therapy at the hospital in [place].   Infectious diseases is recommending a minimum course of antibiotics to stop on 2/28- however CT scan will need to be obtained prior to this- patient was informed of such prior to discharge.  Upon discharge he is nontoxic appearing, afebrile, with diminished but otherwise clear breath sounds.       The patient met 2-midnight criteria for an inpatient stay at the time of discharge.    Therefore, he is discharged in good and stable condition with close outpatient follow-up.    SPECIFIC OUTPATIENT FOLLOW-UP  Primary care-Dr Rodriguez- today  [facility]- infusion services- tomorrow> 2/28/18  CT scan chest on/@ 2/24    DISCHARGE PROBLEM LIST  Principal Problem:    Necrotizing pneumonia due to MSSA (CMS-HCC) POA: Yes  Active Problems:    Cavitary lesion of lung POA: Yes    Acute respiratory failure with hypoxia due to necrotizing pneumonia (CMS-HCC) POA: Yes    Hyponatremia POA: Yes    Hypokalemia POA: Yes    Hypocalcemia POA: Yes     Normocytic anemia POA: Yes    Hypoalbuminemia POA: Yes    Uncontrolled type II diabetes mellitus (CMS-HCC) POA: Yes    Pneumonia POA: Unknown    Multiple pulmonary nodules POA: Unknown  Resolved Problems:    * No resolved hospital problems. *      FOLLOW UP  No future appointments.  No follow-up provider specified.    MEDICATIONS ON DISCHARGE   Everardo Breaux   Home Medication Instructions SHERRIE:[id number]    Printed on:02/10/18 6042   Medication Information                      fluconazole (DIFLUCAN) 200 MG Tab  Take 2 Tabs by mouth every day.             guaiFENesin LA (MUCINEX) 600 MG TABLET SR 12 HR  Take 1 Tab by mouth every 12 hours.             liraglutide (VICTOZA) 18 MG/3ML Solution Pen-injector injection  Inject 1.8 mg as instructed every day.             metformin (GLUCOPHAGE) 1000 MG tablet  Take 1,000 mg by mouth 2 times a day, with meals.             sterile water SOLN 10 mL with ertapenem 1 GM SOLR 1,000 mg  1,000 mg by Intravenous route every 24 hours for 19 days.                 DIET  Regular  ACTIVITY  Light-duty-discussed      CONSULTATIONS  Infectious disease  Pulmonary    PROCEDURES  Bronchoscopy- Dr RONEL Beasley 1/31/2018    LABORATORY  Lab Results   Component Value Date/Time    SODIUM 133 (L) 02/09/2018 03:50 AM    POTASSIUM 4.3 02/09/2018 03:50 AM    CHLORIDE 106 02/09/2018 03:50 AM    CO2 22 02/09/2018 03:50 AM    GLUCOSE 100 (H) 02/09/2018 03:50 AM    BUN 10 02/09/2018 03:50 AM    CREATININE 0.75 02/09/2018 03:50 AM        Lab Results   Component Value Date/Time    WBC 10.8 02/01/2018 02:24 AM    HEMOGLOBIN 10.3 (L) 02/01/2018 02:24 AM    HEMATOCRIT 31.2 (L) 02/01/2018 02:24 AM    PLATELETCT 257 02/01/2018 02:24 AM        Total time of the discharge process exceeds 40 minutes

## 2018-02-14 NOTE — PROGRESS NOTES
Received a call from microbiology that a single colony from a bronchoscopy is growing coccidiodes immitis. From reviewing the chart, patient had known positivity for IgG coccidiomycosis and was followed by ID and pulm. She was treated appropriate so no further intervention was needed at this time.

## 2018-02-26 LAB
FUNGUS SPEC CULT: ABNORMAL
FUNGUS SPEC CULT: NORMAL
HBA1C MFR BLD: 9.8 % (ref ?–5.8)
SIGNIFICANT IND 70042: ABNORMAL
SIGNIFICANT IND 70042: NORMAL
SITE SITE: ABNORMAL
SITE SITE: NORMAL
SOURCE SOURCE: ABNORMAL
SOURCE SOURCE: NORMAL

## 2018-02-28 ENCOUNTER — OFFICE VISIT (OUTPATIENT)
Dept: INFECTIOUS DISEASES | Facility: MEDICAL CENTER | Age: 58
End: 2018-02-28
Payer: OTHER MISCELLANEOUS

## 2018-02-28 VITALS
HEIGHT: 67 IN | TEMPERATURE: 98.2 F | SYSTOLIC BLOOD PRESSURE: 106 MMHG | HEART RATE: 95 BPM | OXYGEN SATURATION: 96 % | WEIGHT: 177.8 LBS | DIASTOLIC BLOOD PRESSURE: 64 MMHG | BODY MASS INDEX: 27.91 KG/M2

## 2018-02-28 DIAGNOSIS — J85.0 NECROTIZING PNEUMONIA (HCC): ICD-10-CM

## 2018-02-28 DIAGNOSIS — E11.8 UNCONTROLLED TYPE 2 DIABETES MELLITUS WITH COMPLICATION, UNSPECIFIED LONG TERM INSULIN USE STATUS: ICD-10-CM

## 2018-02-28 DIAGNOSIS — J98.4 CAVITARY LESION OF LUNG: ICD-10-CM

## 2018-02-28 DIAGNOSIS — E11.65 UNCONTROLLED TYPE 2 DIABETES MELLITUS WITH COMPLICATION, UNSPECIFIED LONG TERM INSULIN USE STATUS: ICD-10-CM

## 2018-02-28 PROCEDURE — 99204 OFFICE O/P NEW MOD 45 MIN: CPT | Mod: 29 | Performed by: NURSE PRACTITIONER

## 2018-02-28 RX ORDER — FLUCONAZOLE 100 MG/1
200 TABLET ORAL DAILY
Qty: 60 TAB | Refills: 0 | Status: SHIPPED | OUTPATIENT
Start: 2018-02-28 | End: 2018-03-30
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
## 2018-03-07 DIAGNOSIS — J98.4 CAVITARY LESION OF LUNG: ICD-10-CM

## 2018-03-07 RX ORDER — AMOXICILLIN AND CLAVULANATE POTASSIUM 875; 125 MG/1; MG/1
1 TABLET, FILM COATED ORAL 2 TIMES DAILY
Qty: 28 TAB | Refills: 0 | Status: SHIPPED | OUTPATIENT
Start: 2018-03-07 | End: 2018-03-21

## 2018-03-07 NOTE — PROGRESS NOTES
Discussed case with Dr. Melara.  CT reviewed, ok to end IV Ertapenem today.  D/C PICC.  Start PO Augmentin x 2 weeks.  Continue PO Diflucan.  Cocci DNA & Cocci AB to [facility] ordered- will mail copy of labs to pt.  LM for pt regarding plans.   Spoke with GIA Acosta at [facility]- order given to remove PICC.

## 2018-03-21 ENCOUNTER — HOSPITAL ENCOUNTER (OUTPATIENT)
Dept: LAB | Facility: MEDICAL CENTER | Age: 58
End: 2018-03-21
Attending: NURSE PRACTITIONER
Payer: COMMERCIAL

## 2018-03-21 ENCOUNTER — OFFICE VISIT (OUTPATIENT)
Dept: INFECTIOUS DISEASES | Facility: MEDICAL CENTER | Age: 58
End: 2018-03-21
Payer: OTHER MISCELLANEOUS

## 2018-03-21 VITALS
HEIGHT: 67 IN | TEMPERATURE: 98.4 F | HEART RATE: 89 BPM | OXYGEN SATURATION: 96 % | SYSTOLIC BLOOD PRESSURE: 118 MMHG | DIASTOLIC BLOOD PRESSURE: 64 MMHG | BODY MASS INDEX: 28.72 KG/M2 | WEIGHT: 183 LBS

## 2018-03-21 DIAGNOSIS — J98.4 CAVITARY LESION OF LUNG: ICD-10-CM

## 2018-03-21 DIAGNOSIS — E11.65 UNCONTROLLED TYPE 2 DIABETES MELLITUS WITH COMPLICATION, UNSPECIFIED LONG TERM INSULIN USE STATUS: ICD-10-CM

## 2018-03-21 DIAGNOSIS — B38.9 COCCIDIOIDOMYCOSIS: ICD-10-CM

## 2018-03-21 DIAGNOSIS — E11.8 UNCONTROLLED TYPE 2 DIABETES MELLITUS WITH COMPLICATION, UNSPECIFIED LONG TERM INSULIN USE STATUS: ICD-10-CM

## 2018-03-21 LAB
ALBUMIN SERPL BCP-MCNC: 3.6 G/DL (ref 3.2–4.9)
ALP SERPL-CCNC: 106 U/L (ref 30–99)
ALT SERPL-CCNC: 26 U/L (ref 2–50)
AST SERPL-CCNC: 27 U/L (ref 12–45)
BILIRUB CONJ SERPL-MCNC: <0.1 MG/DL (ref 0.1–0.5)
BILIRUB INDIRECT SERPL-MCNC: ABNORMAL MG/DL (ref 0–1)
BILIRUB SERPL-MCNC: 0.3 MG/DL (ref 0.1–1.5)
PROT SERPL-MCNC: 7.7 G/DL (ref 6–8.2)

## 2018-03-21 PROCEDURE — 36415 COLL VENOUS BLD VENIPUNCTURE: CPT

## 2018-03-21 PROCEDURE — 86635 COCCIDIOIDES ANTIBODY: CPT

## 2018-03-21 PROCEDURE — 99214 OFFICE O/P EST MOD 30 MIN: CPT | Performed by: NURSE PRACTITIONER

## 2018-03-21 PROCEDURE — 80076 HEPATIC FUNCTION PANEL: CPT

## 2018-03-21 RX ORDER — FLUCONAZOLE 100 MG/1
200 TABLET ORAL DAILY
Qty: 60 TAB | Refills: 5 | Status: SHIPPED | OUTPATIENT
Start: 2018-03-21 | End: 2018-09-18 | Stop reason: SDUPTHER
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
## 2018-03-29 LAB
MYCOBACTERIUM SPEC CULT: NORMAL
RHODAMINE-AURAMINE STN SPEC: NORMAL
SIGNIFICANT IND 70042: NORMAL
SITE SITE: NORMAL
SOURCE SOURCE: NORMAL

## 2018-04-11 LAB
C IMMITIS IGM SPEC QL IA: 0.2 IV
COCCIDIOIDES AB SPEC QL ID: DETECTED
COCCIDIOIDES AB TITR SER CF: ABNORMAL {TITER}
COCCIDIOIDES IGG SPEC QL IA: 3.9 IV

## 2018-05-21 ENCOUNTER — HOSPITAL ENCOUNTER (OUTPATIENT)
Dept: LAB | Facility: MEDICAL CENTER | Age: 58
End: 2018-05-21
Attending: NURSE PRACTITIONER
Payer: OTHER MISCELLANEOUS

## 2018-05-21 ENCOUNTER — OFFICE VISIT (OUTPATIENT)
Dept: INFECTIOUS DISEASES | Facility: MEDICAL CENTER | Age: 58
End: 2018-05-21
Payer: OTHER MISCELLANEOUS

## 2018-05-21 VITALS
SYSTOLIC BLOOD PRESSURE: 110 MMHG | DIASTOLIC BLOOD PRESSURE: 62 MMHG | TEMPERATURE: 98.1 F | OXYGEN SATURATION: 97 % | HEIGHT: 67 IN | HEART RATE: 89 BPM | BODY MASS INDEX: 30.29 KG/M2 | WEIGHT: 193 LBS

## 2018-05-21 DIAGNOSIS — J98.4 CAVITARY LESION OF LUNG: ICD-10-CM

## 2018-05-21 DIAGNOSIS — B38.9 COCCIDIOIDOMYCOSIS: ICD-10-CM

## 2018-05-21 DIAGNOSIS — E11.65 UNCONTROLLED TYPE 2 DIABETES MELLITUS WITH COMPLICATION, UNSPECIFIED WHETHER LONG TERM INSULIN USE: ICD-10-CM

## 2018-05-21 DIAGNOSIS — E11.8 UNCONTROLLED TYPE 2 DIABETES MELLITUS WITH COMPLICATION, UNSPECIFIED WHETHER LONG TERM INSULIN USE: ICD-10-CM

## 2018-05-21 LAB
ALBUMIN SERPL BCP-MCNC: 4.2 G/DL (ref 3.2–4.9)
ALP SERPL-CCNC: 89 U/L (ref 30–99)
ALT SERPL-CCNC: 8 U/L (ref 2–50)
AST SERPL-CCNC: 8 U/L (ref 12–45)
BILIRUB CONJ SERPL-MCNC: <0.1 MG/DL (ref 0.1–0.5)
BILIRUB INDIRECT SERPL-MCNC: ABNORMAL MG/DL (ref 0–1)
BILIRUB SERPL-MCNC: 0.2 MG/DL (ref 0.1–1.5)
PROT SERPL-MCNC: 7.3 G/DL (ref 6–8.2)

## 2018-05-21 PROCEDURE — 99213 OFFICE O/P EST LOW 20 MIN: CPT | Performed by: NURSE PRACTITIONER

## 2018-05-21 PROCEDURE — 80076 HEPATIC FUNCTION PANEL: CPT

## 2018-05-21 PROCEDURE — 86635 COCCIDIOIDES ANTIBODY: CPT | Mod: 91

## 2018-05-21 PROCEDURE — 36415 COLL VENOUS BLD VENIPUNCTURE: CPT
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
## 2018-05-21 NOTE — LETTER
May 21, 2018    To Whom It May Concern:         This is confirmation that Everardo Breaux attended his scheduled appointment with RUBEN Woody on 5/21/18.    Everardo may return to work, full duty.  However, it is encouraged that he ease back into full duty.         If you have any questions please do not hesitate to call me at the phone number listed below.    Sincerely,          AMANDA Woody.  [phone]

## 2018-06-07 LAB
C IMMITIS IGM SPEC QL IA: 0.2 IV
COCCIDIOIDES AB SPEC QL ID: DETECTED
COCCIDIOIDES AB TITR SER CF: ABNORMAL {TITER}
COCCIDIOIDES IGG SPEC QL IA: 5.8 IV

## 2018-10-11 ENCOUNTER — OFFICE VISIT (OUTPATIENT)
Dept: INFECTIOUS DISEASES | Facility: MEDICAL CENTER | Age: 58
End: 2018-10-11
Payer: OTHER MISCELLANEOUS

## 2018-10-11 VITALS
SYSTOLIC BLOOD PRESSURE: 120 MMHG | OXYGEN SATURATION: 96 % | TEMPERATURE: 98.3 F | BODY MASS INDEX: 29.98 KG/M2 | HEART RATE: 80 BPM | DIASTOLIC BLOOD PRESSURE: 74 MMHG | HEIGHT: 67 IN | WEIGHT: 191 LBS

## 2018-10-11 DIAGNOSIS — J98.4 CAVITARY LESION OF LUNG: ICD-10-CM

## 2018-10-11 DIAGNOSIS — E11.9 TYPE 2 DIABETES MELLITUS WITHOUT COMPLICATION, UNSPECIFIED WHETHER LONG TERM INSULIN USE (HCC): ICD-10-CM

## 2018-10-11 DIAGNOSIS — B38.9 COCCIDIOIDOMYCOSIS: ICD-10-CM

## 2018-10-11 PROCEDURE — 99213 OFFICE O/P EST LOW 20 MIN: CPT | Mod: 29 | Performed by: NURSE PRACTITIONER
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
## 2019-01-15 ENCOUNTER — OFFICE VISIT (OUTPATIENT)
Dept: INFECTIOUS DISEASES | Facility: MEDICAL CENTER | Age: 59
End: 2019-01-15
Payer: COMMERCIAL

## 2019-01-15 VITALS
BODY MASS INDEX: 27.94 KG/M2 | WEIGHT: 178 LBS | HEIGHT: 67 IN | HEART RATE: 98 BPM | OXYGEN SATURATION: 96 % | SYSTOLIC BLOOD PRESSURE: 110 MMHG | DIASTOLIC BLOOD PRESSURE: 60 MMHG | TEMPERATURE: 98.3 F

## 2019-01-15 DIAGNOSIS — B38.9 COCCIDIOIDOMYCOSIS: ICD-10-CM

## 2019-01-15 DIAGNOSIS — J98.4 CAVITARY LESION OF LUNG: ICD-10-CM

## 2019-01-15 DIAGNOSIS — E11.9 TYPE 2 DIABETES MELLITUS WITHOUT COMPLICATION, UNSPECIFIED WHETHER LONG TERM INSULIN USE (HCC): ICD-10-CM

## 2019-01-15 PROCEDURE — 99213 OFFICE O/P EST LOW 20 MIN: CPT | Performed by: NURSE PRACTITIONER

## 2019-01-15 RX ORDER — LISINOPRIL 2.5 MG/1
2.5 TABLET ORAL EVERY MORNING
Refills: 3 | COMMUNITY
Start: 2018-12-12

## 2019-01-15 RX ORDER — ROSUVASTATIN CALCIUM 10 MG/1
10 TABLET, COATED ORAL DAILY
Refills: 0 | COMMUNITY
Start: 2018-12-12 | End: 2023-10-18
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
## 2019-02-05 ENCOUNTER — TELEPHONE (OUTPATIENT)
Dept: INFECTIOUS DISEASES | Facility: MEDICAL CENTER | Age: 59
End: 2019-02-05

## 2019-02-05 ENCOUNTER — DOCUMENTATION (OUTPATIENT)
Dept: INFECTIOUS DISEASES | Facility: MEDICAL CENTER | Age: 59
End: 2019-02-05

## 2019-02-05 DIAGNOSIS — B38.9 COCCIDIOIDOMYCOSIS: ICD-10-CM

## 2019-02-05 DIAGNOSIS — J98.4 CAVITARY LESION OF LUNG: ICD-10-CM

## 2019-02-05 NOTE — PROGRESS NOTES
Cocci titer now 1:8, which is an improvement from 5/21/2018 titer of 1:32.  Ok for pt to stop antifungals.  Will repeat Cocci titer again in 2 months.  Also, ESR= 62 on 1/18/19, improvement from 2/7/18 at 119.  Will also repeat ESR in 2 months.  If worsening, then will need to resume therapy.

## 2019-02-05 NOTE — TELEPHONE ENCOUNTER
"Pt called wanting to know his cocci result and if he needs his abx extended. He's out of abx now.  FV on 02/13/19.    Rachael:  \"Cocci titer now 1:8, which is an improvement from 5/21/2018 titer of 1:32.  Ok for pt to stop antifungals.  Will repeat Cocci titer again in 2 months.  Also, ESR= 62 on 1/18/19, improvement from 2/7/18 at 119.  Will also repeat ESR in 2 months.  If worsening, then will need to resume therapy.   Reschedule FU in 2 months, tell him that he needs to complete Cocci and ESR at least 1 week prior to FU appt.   Call if he has any fevers, chills, productive cough or SOB coming off the antifungals\"    Relayed all information to pt and rescheduled appt. Pt verbalized that he understood and will comply.  -AMP  "

## 2023-10-04 ENCOUNTER — APPOINTMENT (OUTPATIENT)
Dept: ADMISSIONS | Facility: MEDICAL CENTER | Age: 63
End: 2023-10-04
Attending: UROLOGY
Payer: COMMERCIAL

## 2023-10-18 ENCOUNTER — PRE-ADMISSION TESTING (OUTPATIENT)
Dept: ADMISSIONS | Facility: MEDICAL CENTER | Age: 63
End: 2023-10-18
Attending: UROLOGY
Payer: COMMERCIAL

## 2023-10-18 ENCOUNTER — HOSPITAL ENCOUNTER (OUTPATIENT)
Facility: MEDICAL CENTER | Age: 63
End: 2023-10-18
Attending: UROLOGY
Payer: COMMERCIAL

## 2023-10-18 PROCEDURE — 81001 URINALYSIS AUTO W/SCOPE: CPT

## 2023-10-18 RX ORDER — FLUCONAZOLE 200 MG/1
600 TABLET ORAL DAILY
COMMUNITY
Start: 2023-06-20

## 2023-10-18 RX ORDER — ASPIRIN 81 MG
81 TABLET,CHEWABLE ORAL DAILY
COMMUNITY
Start: 2023-09-20

## 2023-10-18 RX ORDER — FINASTERIDE 5 MG/1
5 TABLET, FILM COATED ORAL
COMMUNITY

## 2023-10-18 RX ORDER — GLIPIZIDE 5 MG/1
5 TABLET ORAL EVERY MORNING
COMMUNITY
Start: 2023-08-15

## 2023-10-18 RX ORDER — GABAPENTIN 100 MG/1
100 CAPSULE ORAL 3 TIMES DAILY
COMMUNITY
Start: 2023-09-05

## 2023-10-18 RX ORDER — ATORVASTATIN CALCIUM 10 MG/1
10 TABLET, FILM COATED ORAL
COMMUNITY
Start: 2023-08-15 | End: 2023-12-18 | Stop reason: SDUPTHER

## 2023-10-18 RX ORDER — OMEPRAZOLE 40 MG/1
40 CAPSULE, DELAYED RELEASE ORAL
Status: ON HOLD | COMMUNITY
End: 2023-11-06

## 2023-10-18 NOTE — OR NURSING
Pt to have UA and Urine Culture done at Dr. Hernandes's office 10/19/23, as pt has nixon catheter in place.  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  Pt is being followed for this issue.  Anesthesia notified today of this issue.

## 2023-10-21 LAB
APPEARANCE UR: CLEAR
BACTERIA #/AREA URNS HPF: NEGATIVE /HPF
BILIRUB UR QL STRIP.AUTO: NEGATIVE
COLOR UR: YELLOW
EPI CELLS #/AREA URNS HPF: ABNORMAL /HPF
GLUCOSE UR STRIP.AUTO-MCNC: NEGATIVE MG/DL
HYALINE CASTS #/AREA URNS LPF: ABNORMAL /LPF
KETONES UR STRIP.AUTO-MCNC: NEGATIVE MG/DL
LEUKOCYTE ESTERASE UR QL STRIP.AUTO: ABNORMAL
MICRO URNS: ABNORMAL
NITRITE UR QL STRIP.AUTO: NEGATIVE
PH UR STRIP.AUTO: 6.5 [PH] (ref 5–8)
PROT UR QL STRIP: NEGATIVE MG/DL
RBC # URNS HPF: ABNORMAL /HPF
RBC UR QL AUTO: NEGATIVE
SP GR UR STRIP.AUTO: 1
UROBILINOGEN UR STRIP.AUTO-MCNC: 0.2 MG/DL
WBC #/AREA URNS HPF: ABNORMAL /HPF
YEAST BUDDING URNS QL: PRESENT /HPF

## 2023-10-25 NOTE — OR NURSING
Orders per Dr. Hernandes done at [facility].  In Epic.  Please do ECG and A1C dos per anesthesia.  Thank you!

## 2023-11-06 ENCOUNTER — ANESTHESIA EVENT (OUTPATIENT)
Dept: SURGERY | Facility: MEDICAL CENTER | Age: 63
End: 2023-11-06
Payer: COMMERCIAL

## 2023-11-06 ENCOUNTER — PHARMACY VISIT (OUTPATIENT)
Dept: PHARMACY | Facility: MEDICAL CENTER | Age: 63
End: 2023-11-06
Payer: COMMERCIAL

## 2023-11-06 ENCOUNTER — HOSPITAL ENCOUNTER (OUTPATIENT)
Facility: MEDICAL CENTER | Age: 63
End: 2023-11-06
Attending: UROLOGY | Admitting: UROLOGY
Payer: COMMERCIAL

## 2023-11-06 ENCOUNTER — ANESTHESIA (OUTPATIENT)
Dept: SURGERY | Facility: MEDICAL CENTER | Age: 63
End: 2023-11-06
Payer: COMMERCIAL

## 2023-11-06 VITALS
HEIGHT: 68 IN | TEMPERATURE: 97 F | RESPIRATION RATE: 17 BRPM | OXYGEN SATURATION: 98 % | BODY MASS INDEX: 26.96 KG/M2 | SYSTOLIC BLOOD PRESSURE: 123 MMHG | WEIGHT: 177.91 LBS | HEART RATE: 84 BPM | DIASTOLIC BLOOD PRESSURE: 67 MMHG

## 2023-11-06 DIAGNOSIS — R33.9 RETENTION, URINE: Chronic | ICD-10-CM

## 2023-11-06 LAB
EKG IMPRESSION: NORMAL
GLUCOSE BLD STRIP.AUTO-MCNC: 174 MG/DL (ref 65–99)
PATHOLOGY CONSULT NOTE: NORMAL

## 2023-11-06 PROCEDURE — 93010 ELECTROCARDIOGRAM REPORT: CPT | Performed by: INTERNAL MEDICINE

## 2023-11-06 PROCEDURE — 93005 ELECTROCARDIOGRAM TRACING: CPT | Performed by: UROLOGY

## 2023-11-06 PROCEDURE — 160046 HCHG PACU - 1ST 60 MINS PHASE II: Performed by: UROLOGY

## 2023-11-06 PROCEDURE — 700102 HCHG RX REV CODE 250 W/ 637 OVERRIDE(OP): Performed by: STUDENT IN AN ORGANIZED HEALTH CARE EDUCATION/TRAINING PROGRAM

## 2023-11-06 PROCEDURE — 88305 TISSUE EXAM BY PATHOLOGIST: CPT

## 2023-11-06 PROCEDURE — RXMED WILLOW AMBULATORY MEDICATION CHARGE: Performed by: UROLOGY

## 2023-11-06 PROCEDURE — 700111 HCHG RX REV CODE 636 W/ 250 OVERRIDE (IP): Mod: JZ | Performed by: UROLOGY

## 2023-11-06 PROCEDURE — 160035 HCHG PACU - 1ST 60 MINS PHASE I: Performed by: UROLOGY

## 2023-11-06 PROCEDURE — 160002 HCHG RECOVERY MINUTES (STAT): Performed by: UROLOGY

## 2023-11-06 PROCEDURE — 160025 RECOVERY II MINUTES (STATS): Performed by: UROLOGY

## 2023-11-06 PROCEDURE — A9270 NON-COVERED ITEM OR SERVICE: HCPCS | Performed by: STUDENT IN AN ORGANIZED HEALTH CARE EDUCATION/TRAINING PROGRAM

## 2023-11-06 PROCEDURE — 700101 HCHG RX REV CODE 250: Performed by: STUDENT IN AN ORGANIZED HEALTH CARE EDUCATION/TRAINING PROGRAM

## 2023-11-06 PROCEDURE — 160009 HCHG ANES TIME/MIN: Performed by: UROLOGY

## 2023-11-06 PROCEDURE — 160038 HCHG SURGERY MINUTES - EA ADDL 1 MIN LEVEL 2: Performed by: UROLOGY

## 2023-11-06 PROCEDURE — 700111 HCHG RX REV CODE 636 W/ 250 OVERRIDE (IP): Performed by: STUDENT IN AN ORGANIZED HEALTH CARE EDUCATION/TRAINING PROGRAM

## 2023-11-06 PROCEDURE — 160027 HCHG SURGERY MINUTES - 1ST 30 MINS LEVEL 2: Performed by: UROLOGY

## 2023-11-06 PROCEDURE — 160048 HCHG OR STATISTICAL LEVEL 1-5: Performed by: UROLOGY

## 2023-11-06 PROCEDURE — 700105 HCHG RX REV CODE 258: Performed by: UROLOGY

## 2023-11-06 PROCEDURE — 82962 GLUCOSE BLOOD TEST: CPT

## 2023-11-06 PROCEDURE — 160036 HCHG PACU - EA ADDL 30 MINS PHASE I: Performed by: UROLOGY

## 2023-11-06 RX ORDER — EPHEDRINE SULFATE 50 MG/ML
5 INJECTION, SOLUTION INTRAVENOUS
Status: DISCONTINUED | OUTPATIENT
Start: 2023-11-06 | End: 2023-11-06 | Stop reason: HOSPADM

## 2023-11-06 RX ORDER — HALOPERIDOL 5 MG/ML
1 INJECTION INTRAMUSCULAR
Status: DISCONTINUED | OUTPATIENT
Start: 2023-11-06 | End: 2023-11-06 | Stop reason: HOSPADM

## 2023-11-06 RX ORDER — GENTAMICIN SULFATE 40 MG/ML
INJECTION, SOLUTION INTRAMUSCULAR; INTRAVENOUS
Status: DISCONTINUED | OUTPATIENT
Start: 2023-11-06 | End: 2023-11-06 | Stop reason: HOSPADM

## 2023-11-06 RX ORDER — OXYCODONE HCL 5 MG/5 ML
10 SOLUTION, ORAL ORAL
Status: DISCONTINUED | OUTPATIENT
Start: 2023-11-06 | End: 2023-11-06 | Stop reason: HOSPADM

## 2023-11-06 RX ORDER — HYDROMORPHONE HYDROCHLORIDE 1 MG/ML
0.1 INJECTION, SOLUTION INTRAMUSCULAR; INTRAVENOUS; SUBCUTANEOUS
Status: DISCONTINUED | OUTPATIENT
Start: 2023-11-06 | End: 2023-11-06 | Stop reason: HOSPADM

## 2023-11-06 RX ORDER — SODIUM CHLORIDE, SODIUM LACTATE, POTASSIUM CHLORIDE, CALCIUM CHLORIDE 600; 310; 30; 20 MG/100ML; MG/100ML; MG/100ML; MG/100ML
INJECTION, SOLUTION INTRAVENOUS CONTINUOUS
Status: ACTIVE | OUTPATIENT
Start: 2023-11-06 | End: 2023-11-06

## 2023-11-06 RX ORDER — SODIUM CHLORIDE, SODIUM LACTATE, POTASSIUM CHLORIDE, CALCIUM CHLORIDE 600; 310; 30; 20 MG/100ML; MG/100ML; MG/100ML; MG/100ML
INJECTION, SOLUTION INTRAVENOUS CONTINUOUS
Status: DISCONTINUED | OUTPATIENT
Start: 2023-11-06 | End: 2023-11-06 | Stop reason: HOSPADM

## 2023-11-06 RX ORDER — LABETALOL HYDROCHLORIDE 5 MG/ML
5 INJECTION, SOLUTION INTRAVENOUS
Status: DISCONTINUED | OUTPATIENT
Start: 2023-11-06 | End: 2023-11-06 | Stop reason: HOSPADM

## 2023-11-06 RX ORDER — HYDROMORPHONE HYDROCHLORIDE 1 MG/ML
0.2 INJECTION, SOLUTION INTRAMUSCULAR; INTRAVENOUS; SUBCUTANEOUS
Status: DISCONTINUED | OUTPATIENT
Start: 2023-11-06 | End: 2023-11-06 | Stop reason: HOSPADM

## 2023-11-06 RX ORDER — ONDANSETRON 2 MG/ML
4 INJECTION INTRAMUSCULAR; INTRAVENOUS
Status: DISCONTINUED | OUTPATIENT
Start: 2023-11-06 | End: 2023-11-06 | Stop reason: HOSPADM

## 2023-11-06 RX ORDER — CEFAZOLIN SODIUM 1 G/3ML
INJECTION, POWDER, FOR SOLUTION INTRAMUSCULAR; INTRAVENOUS PRN
Status: DISCONTINUED | OUTPATIENT
Start: 2023-11-06 | End: 2023-11-06 | Stop reason: SURG

## 2023-11-06 RX ORDER — HYDROMORPHONE HYDROCHLORIDE 1 MG/ML
0.4 INJECTION, SOLUTION INTRAMUSCULAR; INTRAVENOUS; SUBCUTANEOUS
Status: DISCONTINUED | OUTPATIENT
Start: 2023-11-06 | End: 2023-11-06 | Stop reason: HOSPADM

## 2023-11-06 RX ORDER — LIDOCAINE HYDROCHLORIDE 20 MG/ML
INJECTION, SOLUTION EPIDURAL; INFILTRATION; INTRACAUDAL; PERINEURAL PRN
Status: DISCONTINUED | OUTPATIENT
Start: 2023-11-06 | End: 2023-11-06 | Stop reason: SURG

## 2023-11-06 RX ORDER — MEPERIDINE HYDROCHLORIDE 25 MG/ML
12.5 INJECTION INTRAMUSCULAR; INTRAVENOUS; SUBCUTANEOUS
Status: DISCONTINUED | OUTPATIENT
Start: 2023-11-06 | End: 2023-11-06 | Stop reason: HOSPADM

## 2023-11-06 RX ORDER — VANCOMYCIN HYDROCHLORIDE 500 MG/10ML
INJECTION, POWDER, LYOPHILIZED, FOR SOLUTION INTRAVENOUS
Status: COMPLETED | OUTPATIENT
Start: 2023-11-06 | End: 2023-11-06

## 2023-11-06 RX ORDER — HYDRALAZINE HYDROCHLORIDE 20 MG/ML
5 INJECTION INTRAMUSCULAR; INTRAVENOUS
Status: DISCONTINUED | OUTPATIENT
Start: 2023-11-06 | End: 2023-11-06 | Stop reason: HOSPADM

## 2023-11-06 RX ORDER — OXYCODONE HCL 5 MG/5 ML
5 SOLUTION, ORAL ORAL
Status: DISCONTINUED | OUTPATIENT
Start: 2023-11-06 | End: 2023-11-06 | Stop reason: HOSPADM

## 2023-11-06 RX ORDER — DIPHENHYDRAMINE HYDROCHLORIDE 50 MG/ML
12.5 INJECTION INTRAMUSCULAR; INTRAVENOUS
Status: DISCONTINUED | OUTPATIENT
Start: 2023-11-06 | End: 2023-11-06 | Stop reason: HOSPADM

## 2023-11-06 RX ORDER — PHENYLEPHRINE HCL IN 0.9% NACL 0.5 MG/5ML
SYRINGE (ML) INTRAVENOUS PRN
Status: DISCONTINUED | OUTPATIENT
Start: 2023-11-06 | End: 2023-11-06 | Stop reason: SURG

## 2023-11-06 RX ORDER — ACETAMINOPHEN 500 MG
1000 TABLET ORAL ONCE
Status: COMPLETED | OUTPATIENT
Start: 2023-11-06 | End: 2023-11-06

## 2023-11-06 RX ORDER — PHENAZOPYRIDINE HYDROCHLORIDE 200 MG/1
200 TABLET, FILM COATED ORAL 3 TIMES DAILY PRN
Qty: 9 TABLET | Refills: 0 | Status: SHIPPED | OUTPATIENT
Start: 2023-11-06 | End: 2023-11-09

## 2023-11-06 RX ADMIN — FENTANYL CITRATE 50 MCG: 50 INJECTION, SOLUTION INTRAMUSCULAR; INTRAVENOUS at 07:49

## 2023-11-06 RX ADMIN — PROPOFOL 120 MG: 10 INJECTION, EMULSION INTRAVENOUS at 07:27

## 2023-11-06 RX ADMIN — LIDOCAINE HYDROCHLORIDE 100 MG: 20 INJECTION, SOLUTION EPIDURAL; INFILTRATION; INTRACAUDAL at 07:27

## 2023-11-06 RX ADMIN — Medication 100 MCG: at 07:27

## 2023-11-06 RX ADMIN — SODIUM CHLORIDE, POTASSIUM CHLORIDE, SODIUM LACTATE AND CALCIUM CHLORIDE: 600; 310; 30; 20 INJECTION, SOLUTION INTRAVENOUS at 06:58

## 2023-11-06 RX ADMIN — CEFAZOLIN 2 G: 1 INJECTION, POWDER, FOR SOLUTION INTRAMUSCULAR; INTRAVENOUS at 07:31

## 2023-11-06 RX ADMIN — FENTANYL CITRATE 50 MCG: 50 INJECTION, SOLUTION INTRAMUSCULAR; INTRAVENOUS at 07:24

## 2023-11-06 RX ADMIN — Medication 100 MCG: at 07:45

## 2023-11-06 RX ADMIN — Medication 100 MCG: at 07:36

## 2023-11-06 RX ADMIN — ACETAMINOPHEN 1000 MG: 500 TABLET, FILM COATED ORAL at 09:18

## 2023-11-06 ASSESSMENT — PAIN DESCRIPTION - PAIN TYPE
TYPE: SURGICAL PAIN

## 2023-11-06 NOTE — ANESTHESIA PREPROCEDURE EVALUATION
Case: [id number] Anesthesia Start Date/Time: 11/06/23 0724    Procedure: MONOPOLAR TRANSURETHRAL INCISION OF PROSTATE    Pre-op diagnosis: LOWER URINARY TRACT SYMPTOMS DUE TO BENIGN PROSTATIC HYPERTROPHY    Location: [facility] OR 18 / SURGERY [facility]    Surgeons: Terry Hernandes M.D.          Relevant Problems   PULMONARY   (positive) Necrotizing pneumonia due to MSSA (CMS-HCC)   (positive) Pneumonia      ENDO   (positive) Type 2 diabetes mellitus without complication (HCC)       Physical Exam    Airway   Mallampati: II  TM distance: >3 FB  Neck ROM: full       Cardiovascular - normal exam  Rhythm: regular  Rate: normal  (-) murmur     Dental - normal exam  (+) upper dentures, lower dentures           Pulmonary - normal exam  Breath sounds clear to auscultation     Abdominal    Neurological - normal exam                 Anesthesia Plan    ASA 3   ASA physical status 3 criteria: CVA or TIA - history (> 3 months)    Plan - general       Airway plan will be LMA          Induction: intravenous    Postoperative Plan: Postoperative administration of opioids is intended.    Pertinent diagnostic labs and testing reviewed    Informed Consent:    Anesthetic plan and risks discussed with patient.    Use of blood products discussed with: patient whom consented to blood products.

## 2023-11-06 NOTE — OP REPORT
DATE OF SERVICE:  11/06/2023     NAME OF OPERATION: Transurethral resection of prostate.     PREOPERATIVE DIAGNOSIS:  BPH with urinary retention.     POSTOPERATIVE DIAGNOSIS:  BPH with urinary retention.     PRIMARY SURGEON:  Terry Hernandes MD     ANESTHESIOLOGIST:  Renato Ramirez DO     FINDINGS:  Small prostate.  Elevated bladder neck.  Monopolar TURP not   working, transitioned to bipolar resection.     INDICATIONS:  Briefly, the patient is a 63-year-old gentleman with urinary   retention.  Workup demonstrated the presence of a small yet likely obstructing   prostate with an elevated bladder neck.  Upon consideration of options, he   elected to undergo transurethral procedure to attempt to solve his retention.    Informed consent has been obtained.     OPERATION IN DETAIL:  The patient was taken to the operating room and placed   on the operating table in supine position.  After administration of general   anesthetic, he was placed in lithotomy.  Genitals were prepped and draped   sterilely after his indwelling catheter was irrigated with  irrigant.     Male sounds were used to gently dilate the urethral meatus and a 26-Syriac   resectoscope was passed into the bladder with visualizing obturator.  Prostate   was mildly obstructed with an elevated bladder neck, small.  Bladder showed   1-2+ trabeculations.     The monopolar machine at the facility was not working, thus the decision by me   was made to transition to a bipolar loop to create a similar defect through   the prostate.     Utilizing a resection technique, resection was first carried out at the 5 and   7 o'clock positions from the bladder neck to the verumontanum.  The central   median lobe tissue was then resected down to the floor as well.  Several   swipes were taken towards the apex of the prostate to fully open this region   as well, maintaining a wide mucosal strip along the anterior 1/3rd of the   prostate.     Ellik  was used to  irrigate tissue free from the bladder.  Cautery   was used for hemostasis.  There was an excellent appearing wide open channel   at the case conclusion.     A 20-Ukrainian 3-way catheter was passed easily into the bladder draining clear   irrigant at the case conclusion.  It is our intention that this will be weaned   off in recovery and will go home with Mei catheter and void trial in our   office tomorrow.        ______________________________  Terry Hernandes MD MCM/MATHEW/JAGRUTI    DD:  11/06/2023 08:05  DT:  11/06/2023 08:40    Job#:  [id number]

## 2023-11-06 NOTE — ANESTHESIA PROCEDURE NOTES
Airway    Date/Time: 11/6/2023 7:29 AM    Performed by: Renato Ramirez D.O.  Authorized by: Renato Ramirez D.O.    Location:  OR  Urgency:  Elective  Difficult Airway: No    Indications for Airway Management:  Anesthesia      Spontaneous Ventilation: absent    Sedation Level:  Deep  Preoxygenated: Yes    Final Airway Type:  Supraglottic airway  Final Supraglottic Airway:  Standard LMA    SGA Size:  5  Number of Attempts at Approach:  1

## 2023-11-06 NOTE — OR NURSING
Report called to Celia NIELSEN. Plan of care discussed. Patient reports mild tolerable soreness in penis. No complaints of nausea. VSS. Catheter output remains clear. CBI port capped per order.

## 2023-11-06 NOTE — ANESTHESIA POSTPROCEDURE EVALUATION
Patient: Everardo Breaux    Procedure Summary     Date: 11/06/23 Room / Location: [place] [id number] / SURGERY [facility]    Anesthesia Start: 0724 Anesthesia Stop: 0816    Procedure: BIPOLAR TRANSURETHRAL INCISION OF PROSTATE (Bladder) Diagnosis: (LOWER URINARY TRACT SYMPTOMS DUE TO BENIGN PROSTATIC HYPERTROPHY)    Surgeons: Terry Hernandes M.D. Responsible Provider: Renato Ramirez D.O.    Anesthesia Type: general ASA Status: 3          Final Anesthesia Type: general  Last vitals  BP   Blood Pressure: 92/50    Temp   37 °C (98.6 °F)    Pulse   76   Resp   14    SpO2   100 %      Anesthesia Post Evaluation    Patient location during evaluation: PACU  Patient participation: complete - patient participated  Level of consciousness: awake and alert    Airway patency: patent  Anesthetic complications: no  Cardiovascular status: hemodynamically stable  Respiratory status: acceptable  Hydration status: euvolemic    PONV: none          No notable events documented.     Nurse Pain Score: 0 (NPRS)

## 2023-11-06 NOTE — OR SURGEON
Immediate Post OP Note    PreOp Diagnosis: bph with retention      PostOp Diagnosis: same      Procedure(s):  BIPOLAR TRANSURETHRAL RESECTION OF PROSTATE - Wound Class: Clean Contaminated    Surgeon(s):  Terry Hernandes M.D.    Anesthesiologist/Type of Anesthesia:  Anesthesiologist: Renato Ramirez D.O./General    Surgical Staff:  Circulator: Asiya Presley R.N.  Scrub Person: Ori Maldonado; Gail Martinez    Specimens removed if any:  ID Type Source Tests Collected by Time Destination   A : Prostate chips Tissue Prostate PATHOLOGY SPECIMEN Terry Hernandes M.D. 11/6/2023  7:57 AM        Estimated Blood Loss: min    Findings: elev bladder neck; monopolar not working, tissue resected    Complications: none        11/6/2023 8:01 AM Terry Hernandes M.D.

## 2023-11-06 NOTE — OR NURSING
Pt arrived to PACU II at 1051. Pt aa/ox4, VSS. Discharge criteria met. Pain is 1/10 which patient states is tolerable. Pt changed into clothing with assistance. Discharge instructions given; pt and family verbalized understanding and questions answered. Nixon cath emptied, 100ml. Large nixon bag switched to leg bag. IV removed, tip intact. Will follow-up with Dr. Simons in the am. Prescription for pyridium picked-up from pharmacy and given to pt and wife. Pt discharged home and escorted via w/c with RN in stable condition.

## 2023-11-06 NOTE — DISCHARGE INSTRUCTIONS
HOME CARE INSTRUCTIONS    ACTIVITY: Rest and take it easy for the first 24 hours.  A responsible adult is recommended to remain with you during that time.  It is normal to feel sleepy.  We encourage you to not do anything that requires balance, judgment or coordination.    FOR 24 HOURS DO NOT:  Drive, operate machinery or run household appliances.  Drink beer or alcoholic beverages.  Make important decisions or sign legal documents.    SPECIAL INSTRUCTIONS:   Plug in CBI port.  Indwelling Urinary Catheter Care, Adult  An indwelling urinary catheter is a thin tube that is put into your bladder. The tube helps to drain pee (urine) out of your body. The tube goes in through your urethra. Your urethra is where pee comes out of your body. Your pee will come out through the catheter, then it will go into a bag (drainage bag).  Take good care of your catheter so it will work well.  What are the risks?  Germs may get into your bladder and cause an infection.  The tube can become blocked.  Tissue near the catheter may become irritated and may bleed.  How to wear your catheter and drainage bag  Supplies needed  Sticky tape (adhesive tape) or a leg strap.  Alcohol wipe or soap and water (if you use tape).  A clean towel (if you use tape).  Large overnight bag.  Smaller bag (leg bag).  Wearing your catheter  Attach your catheter to your leg with tape or a leg strap.  Make sure the catheter is not pulled tight.  If a leg strap gets wet, take it off and put on a dry strap.  If you use tape to hold the bag on your leg:  Use an alcohol wipe or soap and water to wash your skin where the tape made it sticky before.  Use a clean towel to pat-dry that skin.  Use new tape to make the bag stay on your leg.  Wearing your bags  You should have been given a large overnight bag.  You may wear the overnight bag in the day or night.  Always have the overnight bag lower than your bladder.  Do not let the bag touch the floor.  Before you go to  sleep, put a clean plastic bag in a wastebasket. Then, hang the overnight bag inside the wastebasket.  You should also have a smaller leg bag that fits under your clothes.  Wear the leg bag as told by the product maker. This may be above or below the knee, depending on the length of the tubing.  Make sure that the leg bag is below the bladder.  Make sure that the tubing does not have loops or too much tension.  Do not wear your leg bag at night.  How to care for your skin and catheter  Supplies needed  A clean washcloth.  Water and mild soap.  A clean towel.  Caring for your skin and catheter         Clean the skin around your catheter every day.  Wash your hands with soap and water.  Wet a clean washcloth in warm water and mild soap.  Clean the skin around your urethra.  If you are female:  Gently spread the folds of skin around your vagina (labia).  With the washcloth in your other hand, wipe the inner side of your labia on each side. Wipe from front to back.  If you are male:  Pull back any skin that covers the end of your penis (foreskin).  With the washcloth in your other hand, wipe your penis in small circles. Start wiping at the tip of your penis, then move away from the catheter.  Move the foreskin back in place, if needed.  With your free hand, hold the catheter close to where it goes into your body.  Keep holding the catheter during cleaning so it does not get pulled out.  With the washcloth in your other hand, clean the catheter.  Only wipe downward on the catheter, toward the drainage bag.  Do not wipe upward toward your body. Doing this may push germs into your urethra and cause infection.  Use a clean towel to pat-dry the catheter and the skin around it. Make sure to wipe off all soap.  Wash your hands with soap and water.  Shower every day. Do not take baths.  Do not use cream, ointment, or lotion on the area where the catheter goes into your body, unless your doctor tells you to.  Do not use powders,  sprays, or lotions on your genital area.  Check your skin around the catheter every day for signs of infection. Check for:  Redness, swelling, or pain.  Fluid or blood.  Warmth.  Pus or a bad smell.  How to empty the bag  Supplies needed  Rubbing alcohol.  Gauze pad or cotton ball.  Tape or a leg strap.  Emptying the bag  Pour the pee out of your bag when it is ?-½ full, or at least 2-3 times a day. Do this for your overnight bag and your leg bag.  Wash your hands with soap and water.  Separate (detach) the bag from your leg.  Hold the bag over the toilet or a clean pail. Keep the bag lower than your hips and bladder. This is so the pee (urine) does not go back into the tube.  Open the pour spout. It is at the bottom of the bag.  Empty the pee into the toilet or pail. Do not let the pour spout touch any surface.  Put rubbing alcohol on a gauze pad or cotton ball.  Use the gauze pad or cotton ball to clean the pour spout.  Close the pour spout.  Attach the bag to your leg with tape or a leg strap.  Wash your hands with soap and water.  Follow instructions for cleaning the drainage bag. Instructions can come from:  The product maker.  Your doctor.  How to change the bag  Changing the bag  Replace your bag when it starts to leak, smell bad, or look dirty.  Wash your hands with soap and water.  Separate the dirty bag from your leg.  Pinch the catheter with your fingers so that pee does not spill out.  Separate the catheter tube from the bag tube where these tubes connect (at the connection valve). Do not let the tubes touch any surface.  Clean the end of the catheter tube with an alcohol wipe. Use a different alcohol wipe to clean the end of the bag tube.  Connect the catheter tube to the tube of the clean bag.  Attach the clean bag to your leg with tape or a leg strap. Do not make the bag tight on your leg.  Wash your hands with soap and water.  General instructions    Never pull on your catheter. Never try to take it  out. Doing that can hurt you.  Always wash your hands before and after you touch your catheter or bag. Use a mild, fragrance-free soap. If you do not have soap and water, use hand .  Always make sure there are no twists, bends, or kinks in the catheter tube.  Always make sure there are no leaks in the catheter or bag.  Drink enough fluid to keep your pee pale yellow.  Do not take baths, swim, or use a hot tub.  If you are female, wipe from front to back after you poop (have a bowel movement).  Contact a doctor if:  Your catheter gets clogged.  Your catheter leaks.  You have signs of infection at the catheter site, such as:  Redness, swelling, or pain where the catheter goes into your body.  Fluid, blood, pus, or a bad smell coming from the area where the catheter goes into your body.  Skin feels warm where the catheter goes into your body.  You have signs of a bladder infection, such as:  Fever.  Chills.  Pee smells worse than usual.  Cloudy pee.  Pain in your belly, legs, lower back, or bladder.  Vomiting or feel like vomiting.  Get help right away if:  You see blood in the catheter.  Your pee is pink or red.  Your bladder feels full.  Your pee is not draining into the bag.  Your catheter gets pulled out.  Summary  An indwelling urinary catheter is a thin tube that is placed into the bladder to help drain pee (urine) out of the body.  The catheter is placed into the part of the body that drains pee from the bladder (urethra).  Taking good care of your catheter will keep it working well.  Always wash your hands before and after touching your catheter or bag.  Never pull on your catheter or try to take it out.  This information is not intended to replace advice given to you by your health care provider. Make sure you discuss any questions you have with your health care provider.  Document Revised: 08/18/2022 Document Reviewed: 08/18/2022  [employer] Patient Education © 2023 [employer]    Home with tiffani and  large bag.   Pyridium called in, tylenol as needed.    DIET: To avoid nausea, slowly advance diet as tolerated, avoiding spicy or greasy foods for the first day.  Add more substantial food to your diet according to your physician's instructions.  Babies can be fed formula or breast milk as soon as they are hungry.  INCREASE FLUIDS AND FIBER TO AVOID CONSTIPATION.    SURGICAL DRESSING/BATHING: No dressings.  Okay to shower.  No baths/soaking in water until cleared by DrCaprice    MEDICATIONS: Resume taking daily medication.  Take prescribed pain medication with food.  If no medication is prescribed, you may take non-aspirin pain medication if needed.  PAIN MEDICATION CAN BE VERY CONSTIPATING.  Take a stool softener or laxative such as senokot, pericolace, or milk of magnesia if needed.    Prescription given for Pyridium. No pain medication given in recovery.    A follow-up appointment should be arranged with your doctor tomorrow 11/7 for voiding trial; call to schedule.    You should CALL YOUR PHYSICIAN if you develop:  Fever greater than 101 degrees F.  Pain not relieved by medication, or persistent nausea or vomiting.  Excessive bleeding (blood soaking through dressing) or unexpected drainage from the wound.  Extreme redness or swelling around the incision site, drainage of pus or foul smelling drainage.  Inability to urinate or empty your bladder within 8 hours.  Problems with breathing or chest pain.    You should call 911 if you develop problems with breathing or chest pain.  If you are unable to contact your doctor or surgical center, you should go to the nearest emergency room or urgent care center.  Physician's telephone #: Dr. Hernandes [phone]    MILD FLU-LIKE SYMPTOMS ARE NORMAL.  YOU MAY EXPERIENCE GENERALIZED MUSCLE ACHES, THROAT IRRITATION, HEADACHE AND/OR SOME NAUSEA.    If any questions arise, call your doctor.  If your doctor is not available, please feel free to call the Surgical Center at [phone].  The Center is open Monday through Friday from 7AM to 7PM.      A registered nurse may call you a few days after your surgery to see how you are doing after your procedure.    You may also receive a survey in the mail within the next two weeks and we ask that you take a few moments to complete the survey and return it to us.  Our goal is to provide you with very good care and we value your comments.     Depression / Suicide Risk    As you are discharged from this [facility] facility, it is important to learn how to keep safe from harming yourself.    Recognize the warning signs:  Abrupt changes in personality, positive or negative- including increase in energy   Giving away possessions  Change in eating patterns- significant weight changes-  positive or negative  Change in sleeping patterns- unable to sleep or sleeping all the time   Unwillingness or inability to communicate  Depression  Unusual sadness, discouragement and loneliness  Talk of wanting to die  Neglect of personal appearance   Rebelliousness- reckless behavior  Withdrawal from people/activities they love  Confusion- inability to concentrate     If you or a loved one observes any of these behaviors or has concerns about self-harm, here's what you can do:  Talk about it- your feelings and reasons for harming yourself  Remove any means that you might use to hurt yourself (examples: pills, rope, extension cords, firearm)  Get professional help from the community (Mental Health, Substance Abuse, psychological counseling)  Do not be alone:Call your Safe Contact- someone whom you trust who will be there for you.  Call your local CRISIS HOTLINE 653-9662 or 763-046-0568  Call your local Children's Mobile Crisis Response Team [place] (276) 188-3620 or www.[employer]  Call the toll free National Suicide Prevention Hotlines   National Suicide Prevention Lifeline 742-939-IIZM (6203)  National Hope Line Network 800-SUICIDE (171-7473)    I  acknowledge receipt and understanding of these Home Care instructions.

## 2023-11-06 NOTE — ANESTHESIA TIME REPORT
Anesthesia Start and Stop Event Times     Date Time Event    11/6/2023 0724 Anesthesia Start     0816 Anesthesia Stop        Responsible Staff  11/06/23    Name Role Begin End    Renato Ramirez D.O. Anesth 0724 0816        Overtime Reason:  no overtime (within assigned shift)    Comments:

## 2023-11-16 ENCOUNTER — TELEPHONE (OUTPATIENT)
Dept: HEALTH INFORMATION MANAGEMENT | Facility: OTHER | Age: 63
End: 2023-11-16
Payer: COMMERCIAL

## 2023-12-12 ENCOUNTER — TELEPHONE (OUTPATIENT)
Dept: HEALTH INFORMATION MANAGEMENT | Facility: OTHER | Age: 63
End: 2023-12-12
Payer: COMMERCIAL

## 2023-12-17 ASSESSMENT — ENCOUNTER SYMPTOMS
DYSPNEA AT REST: 0
SHORTNESS OF BREATH: 1
WHEEZING: 0
CHEST TIGHTNESS: 1
HEMOPTYSIS: 1
RESPIRATORY SYMPTOMS COMMENTS: YES

## 2023-12-18 ENCOUNTER — HOSPITAL ENCOUNTER (OUTPATIENT)
Dept: CARDIOLOGY | Facility: MEDICAL CENTER | Age: 63
End: 2023-12-18
Attending: INTERNAL MEDICINE
Payer: COMMERCIAL

## 2023-12-18 ENCOUNTER — OFFICE VISIT (OUTPATIENT)
Dept: SLEEP MEDICINE | Facility: MEDICAL CENTER | Age: 63
End: 2023-12-18
Attending: INTERNAL MEDICINE
Payer: COMMERCIAL

## 2023-12-18 VITALS
OXYGEN SATURATION: 97 % | SYSTOLIC BLOOD PRESSURE: 108 MMHG | HEIGHT: 68 IN | BODY MASS INDEX: 20.92 KG/M2 | WEIGHT: 138 LBS | DIASTOLIC BLOOD PRESSURE: 64 MMHG | HEART RATE: 106 BPM

## 2023-12-18 VITALS
WEIGHT: 176 LBS | HEIGHT: 68 IN | OXYGEN SATURATION: 97 % | HEART RATE: 119 BPM | RESPIRATION RATE: 18 BRPM | SYSTOLIC BLOOD PRESSURE: 110 MMHG | DIASTOLIC BLOOD PRESSURE: 70 MMHG | BODY MASS INDEX: 26.67 KG/M2

## 2023-12-18 DIAGNOSIS — E11.9 TYPE 2 DIABETES MELLITUS WITHOUT COMPLICATION, UNSPECIFIED WHETHER LONG TERM INSULIN USE (HCC): ICD-10-CM

## 2023-12-18 DIAGNOSIS — R00.2 PALPITATIONS: ICD-10-CM

## 2023-12-18 DIAGNOSIS — R00.0 SINUS TACHYCARDIA: ICD-10-CM

## 2023-12-18 DIAGNOSIS — B38.2 PULMONARY COCCIDIOIDOMYCOSIS (HCC): ICD-10-CM

## 2023-12-18 DIAGNOSIS — Z86.73 HISTORY OF CVA (CEREBROVASCULAR ACCIDENT): ICD-10-CM

## 2023-12-18 DIAGNOSIS — E78.5 DYSLIPIDEMIA: ICD-10-CM

## 2023-12-18 DIAGNOSIS — I49.3 PVCS (PREMATURE VENTRICULAR CONTRACTIONS): ICD-10-CM

## 2023-12-18 DIAGNOSIS — I49.1 PREMATURE ATRIAL CONTRACTIONS: ICD-10-CM

## 2023-12-18 PROCEDURE — 93246 EXT ECG>7D<15D RECORDING: CPT

## 2023-12-18 PROCEDURE — 99204 OFFICE O/P NEW MOD 45 MIN: CPT | Performed by: INTERNAL MEDICINE

## 2023-12-18 PROCEDURE — 3074F SYST BP LT 130 MM HG: CPT | Performed by: INTERNAL MEDICINE

## 2023-12-18 PROCEDURE — 99212 OFFICE O/P EST SF 10 MIN: CPT | Performed by: INTERNAL MEDICINE

## 2023-12-18 PROCEDURE — 99205 OFFICE O/P NEW HI 60 MIN: CPT | Mod: 25 | Performed by: INTERNAL MEDICINE

## 2023-12-18 PROCEDURE — 3078F DIAST BP <80 MM HG: CPT | Performed by: INTERNAL MEDICINE

## 2023-12-18 PROCEDURE — 94761 N-INVAS EAR/PLS OXIMETRY MLT: CPT | Performed by: INTERNAL MEDICINE

## 2023-12-18 PROCEDURE — 93306 TTE W/DOPPLER COMPLETE: CPT

## 2023-12-18 PROCEDURE — 93005 ELECTROCARDIOGRAM TRACING: CPT | Performed by: INTERNAL MEDICINE

## 2023-12-18 RX ORDER — METOPROLOL SUCCINATE 25 MG/1
25 TABLET, EXTENDED RELEASE ORAL DAILY
Qty: 30 TABLET | Refills: 11 | Status: SHIPPED | OUTPATIENT
Start: 2023-12-18

## 2023-12-18 RX ORDER — ATORVASTATIN CALCIUM 40 MG/1
40 TABLET, FILM COATED ORAL
Qty: 30 TABLET | Refills: 11 | Status: SHIPPED | OUTPATIENT
Start: 2023-12-18

## 2023-12-18 ASSESSMENT — ENCOUNTER SYMPTOMS
SHORTNESS OF BREATH: 1
CHILLS: 0
FEVER: 0
WHEEZING: 0
WEIGHT LOSS: 1
HEMOPTYSIS: 1

## 2023-12-18 ASSESSMENT — PATIENT HEALTH QUESTIONNAIRE - PHQ9: CLINICAL INTERPRETATION OF PHQ2 SCORE: 0

## 2023-12-18 NOTE — ASSESSMENT & PLAN NOTE
Treated in 2018, recurrence in early 2023 resulting in a very prolonged hospitalization at [facility], with necrotizing/cavitary lung lesions complicated by hemoptysis, started fluconazole, fortunately survived, now slowly recovering.  Functionally quite debilitated.  No desaturations on multi oximetry walk test today in room air.  Has only partially regained the 70 pounds that he lost during his hospitalization.  PFTs show mild restriction, markedly reduced diffusion capacity  --TTE ordered by cardiology, will follow up to further investigate low DLCO  --CT chest with contrast ordered  --Recommend follow-up with PCP for PT and dietitian referral in [place]; unfortunately due to remote living circumstances, pulmonary rehab is not a realistic option  --Follow-up with ID, anticipate lifelong fluconazole, repeat cocci CF titer and imaging as outlined above
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
## 2023-12-18 NOTE — PROCEDURES
Multi-Ox Readings  Multi Ox #1 Room air   O2 sat % at rest 98   O2 sat % on exertion 96   O2 sat average on exertion     Multi Ox #2     O2 sat % at rest     O2 sat % on exertion     O2 sat average on exertion       Oxygen Use     Oxygen Frequency     Duration of need     Is the patient mobile within the home?     CPAP Use?     BIPAP Use?     Servo Titration        No desaturations on multi oximetry walk test on room air today in clinic.    I, Murray Lam M.D. am the author of this note (walk test interpretation).  __________  Murray Lam MD  Pulmonary and Critical Care Medicine  [facility]

## 2023-12-18 NOTE — PROGRESS NOTES
Patient enrolled in the 14 day o Holter monitoring program per Mark Ellis MD.  >Office hook-up, serial # [id number].  >Currently pending EOS.

## 2023-12-19 LAB
LV EJECT FRACT  99904: 60
LV EJECT FRACT MOD 2C 99903: 43.74
LV EJECT FRACT MOD 4C 99902: 58.85
LV EJECT FRACT MOD BP 99901: 49.52

## 2023-12-19 PROCEDURE — 93306 TTE W/DOPPLER COMPLETE: CPT | Mod: 26 | Performed by: INTERNAL MEDICINE

## 2023-12-20 ENCOUNTER — TELEPHONE (OUTPATIENT)
Dept: CARDIOLOGY | Facility: MEDICAL CENTER | Age: 63
End: 2023-12-20
Payer: COMMERCIAL

## 2023-12-20 NOTE — TELEPHONE ENCOUNTER
----- Message from Mark Ellis M.D. sent at 12/20/2023  8:32 AM PST -----  Let him know his echocardiogram looks good.  I also placed a TSH order for him, let him know to get that done.

## 2023-12-21 LAB — EKG IMPRESSION: NORMAL

## 2023-12-21 PROCEDURE — 93010 ELECTROCARDIOGRAM REPORT: CPT | Performed by: INTERNAL MEDICINE
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
## 2024-01-02 ENCOUNTER — OFFICE VISIT (OUTPATIENT)
Dept: INFECTIOUS DISEASES | Facility: MEDICAL CENTER | Age: 64
End: 2024-01-02
Attending: INTERNAL MEDICINE
Payer: COMMERCIAL

## 2024-01-02 ENCOUNTER — HOSPITAL ENCOUNTER (OUTPATIENT)
Dept: LAB | Facility: MEDICAL CENTER | Age: 64
End: 2024-01-02
Attending: INTERNAL MEDICINE
Payer: COMMERCIAL

## 2024-01-02 VITALS
OXYGEN SATURATION: 99 % | HEART RATE: 104 BPM | WEIGHT: 135.58 LBS | SYSTOLIC BLOOD PRESSURE: 92 MMHG | HEIGHT: 68 IN | BODY MASS INDEX: 20.55 KG/M2 | RESPIRATION RATE: 16 BRPM | DIASTOLIC BLOOD PRESSURE: 60 MMHG

## 2024-01-02 DIAGNOSIS — B38.2 PULMONARY COCCIDIOIDOMYCOSIS (HCC): ICD-10-CM

## 2024-01-02 DIAGNOSIS — E11.9 TYPE 2 DIABETES MELLITUS WITHOUT COMPLICATION, UNSPECIFIED WHETHER LONG TERM INSULIN USE (HCC): ICD-10-CM

## 2024-01-02 LAB
ALBUMIN SERPL BCP-MCNC: 3.7 G/DL (ref 3.2–4.9)
ALBUMIN/GLOB SERPL: 0.9 G/DL
ALP SERPL-CCNC: 171 U/L (ref 30–99)
ALT SERPL-CCNC: 6 U/L (ref 2–50)
ANION GAP SERPL CALC-SCNC: 13 MMOL/L (ref 7–16)
AST SERPL-CCNC: 10 U/L (ref 12–45)
BASOPHILS # BLD AUTO: 0.8 % (ref 0–1.8)
BASOPHILS # BLD: 0.05 K/UL (ref 0–0.12)
BILIRUB SERPL-MCNC: 0.2 MG/DL (ref 0.1–1.5)
BUN SERPL-MCNC: 24 MG/DL (ref 8–22)
CALCIUM ALBUM COR SERPL-MCNC: 10 MG/DL (ref 8.5–10.5)
CALCIUM SERPL-MCNC: 9.8 MG/DL (ref 8.5–10.5)
CHLORIDE SERPL-SCNC: 104 MMOL/L (ref 96–112)
CO2 SERPL-SCNC: 23 MMOL/L (ref 20–33)
CREAT SERPL-MCNC: 0.94 MG/DL (ref 0.5–1.4)
EOSINOPHIL # BLD AUTO: 0.1 K/UL (ref 0–0.51)
EOSINOPHIL NFR BLD: 1.5 % (ref 0–6.9)
ERYTHROCYTE [DISTWIDTH] IN BLOOD BY AUTOMATED COUNT: 50.3 FL (ref 35.9–50)
EST. AVERAGE GLUCOSE BLD GHB EST-MCNC: 131 MG/DL
GFR SERPLBLD CREATININE-BSD FMLA CKD-EPI: 90 ML/MIN/1.73 M 2
GLOBULIN SER CALC-MCNC: 4.1 G/DL (ref 1.9–3.5)
GLUCOSE SERPL-MCNC: 151 MG/DL (ref 65–99)
HBA1C MFR BLD: 6.2 % (ref 4–5.6)
HCT VFR BLD AUTO: 33.6 % (ref 42–52)
HGB BLD-MCNC: 10.6 G/DL (ref 14–18)
IMM GRANULOCYTES # BLD AUTO: 0.02 K/UL (ref 0–0.11)
IMM GRANULOCYTES NFR BLD AUTO: 0.3 % (ref 0–0.9)
LYMPHOCYTES # BLD AUTO: 1.55 K/UL (ref 1–4.8)
LYMPHOCYTES NFR BLD: 24 % (ref 22–41)
MCH RBC QN AUTO: 28 PG (ref 27–33)
MCHC RBC AUTO-ENTMCNC: 31.5 G/DL (ref 32.3–36.5)
MCV RBC AUTO: 88.9 FL (ref 81.4–97.8)
MONOCYTES # BLD AUTO: 0.51 K/UL (ref 0–0.85)
MONOCYTES NFR BLD AUTO: 7.9 % (ref 0–13.4)
NEUTROPHILS # BLD AUTO: 4.23 K/UL (ref 1.82–7.42)
NEUTROPHILS NFR BLD: 65.5 % (ref 44–72)
NRBC # BLD AUTO: 0 K/UL
NRBC BLD-RTO: 0 /100 WBC (ref 0–0.2)
PLATELET # BLD AUTO: 304 K/UL (ref 164–446)
PMV BLD AUTO: 8.9 FL (ref 9–12.9)
POTASSIUM SERPL-SCNC: 4.5 MMOL/L (ref 3.6–5.5)
PROT SERPL-MCNC: 7.8 G/DL (ref 6–8.2)
RBC # BLD AUTO: 3.78 M/UL (ref 4.7–6.1)
SODIUM SERPL-SCNC: 140 MMOL/L (ref 135–145)
WBC # BLD AUTO: 6.5 K/UL (ref 4.8–10.8)

## 2024-01-02 PROCEDURE — 86635 COCCIDIOIDES ANTIBODY: CPT | Mod: 91

## 2024-01-02 PROCEDURE — 80053 COMPREHEN METABOLIC PANEL: CPT

## 2024-01-02 PROCEDURE — 85025 COMPLETE CBC W/AUTO DIFF WBC: CPT

## 2024-01-02 PROCEDURE — 83036 HEMOGLOBIN GLYCOSYLATED A1C: CPT

## 2024-01-02 PROCEDURE — 3074F SYST BP LT 130 MM HG: CPT | Performed by: INTERNAL MEDICINE

## 2024-01-02 PROCEDURE — 3078F DIAST BP <80 MM HG: CPT | Performed by: INTERNAL MEDICINE

## 2024-01-02 PROCEDURE — 36415 COLL VENOUS BLD VENIPUNCTURE: CPT

## 2024-01-02 PROCEDURE — 99214 OFFICE O/P EST MOD 30 MIN: CPT | Performed by: INTERNAL MEDICINE

## 2024-01-02 ASSESSMENT — PATIENT HEALTH QUESTIONNAIRE - PHQ9: CLINICAL INTERPRETATION OF PHQ2 SCORE: 0

## 2024-01-05 ENCOUNTER — TELEPHONE (OUTPATIENT)
Dept: CARDIOLOGY | Facility: MEDICAL CENTER | Age: 64
End: 2024-01-05
Payer: COMMERCIAL

## 2024-01-07 LAB
C IMMITIS AB SER QL ID: DETECTED
COCCIDIOIDES AB TITR SER CF: ABNORMAL {TITER}
COCCIDIOIDES IGG SER-ACNC: 7 IV
COCCIDIOIDES IGM SERPL-ACNC: 0.1 IV

## 2024-01-08 PROCEDURE — 93248 EXT ECG>7D<15D REV&INTERPJ: CPT | Performed by: INTERNAL MEDICINE

## 2024-01-18 ENCOUNTER — TELEPHONE (OUTPATIENT)
Dept: SLEEP MEDICINE | Facility: MEDICAL CENTER | Age: 64
End: 2024-01-18
Payer: COMMERCIAL

## 2024-01-18 NOTE — TELEPHONE ENCOUNTER
Incoming fax: [facility]    RE: CT-Chest    Message: Received fax from his PCP sending a CT-Chest report.   Requested images to be pushed

## 2024-01-23 ENCOUNTER — HOSPITAL ENCOUNTER (OUTPATIENT)
Facility: MEDICAL CENTER | Age: 64
End: 2024-01-23
Attending: PHYSICIAN ASSISTANT
Payer: COMMERCIAL

## 2024-01-23 ENCOUNTER — HOSPITAL ENCOUNTER (OUTPATIENT)
Dept: RADIOLOGY | Facility: MEDICAL CENTER | Age: 64
End: 2024-01-23
Payer: COMMERCIAL

## 2024-01-23 LAB — PSA SERPL-MCNC: 0.37 NG/ML (ref 0–4)

## 2024-01-23 PROCEDURE — 84153 ASSAY OF PSA TOTAL: CPT

## 2024-01-30 NOTE — TELEPHONE ENCOUNTER
Murray Lam M.D.  You7 days ago       Thank you Nora- he had two CT scans in 2023 at [facility] in [place]. Could we get those uploaded as well? There are reports in the chart for these scans, but no images    Thank you again!!

## 2024-01-30 NOTE — TELEPHONE ENCOUNTER
Called and spoke with [facility] Film room. They are able to push into our system. Faxed request to them for this. (Fax#[phone])

## 2024-01-31 ENCOUNTER — HOSPITAL ENCOUNTER (OUTPATIENT)
Dept: RADIOLOGY | Facility: MEDICAL CENTER | Age: 64
End: 2024-01-31
Payer: COMMERCIAL

## 2024-02-01 NOTE — TELEPHONE ENCOUNTER
Film room received Image from 03/2023 but not 06/2023.  Called [facility] m/c several more times asking for images to be pushed. Our film still has not received.  Will attempt later today.

## 2024-02-02 ENCOUNTER — HOSPITAL ENCOUNTER (OUTPATIENT)
Dept: RADIOLOGY | Facility: MEDICAL CENTER | Age: 64
End: 2024-02-02
Payer: COMMERCIAL

## 2024-02-02 NOTE — TELEPHONE ENCOUNTER
Called and spoke with film room. She said they received the image from 06/2023. She is going to push it now.

## 2024-02-02 NOTE — TELEPHONE ENCOUNTER
Called and spoke with Leeanne at [facility] M/C. Notify her we haven't received the June image. She is going to re-push it to us.

## 2024-02-12 NOTE — TELEPHONE ENCOUNTER
Murray Lam M.D.  You2 hours ago (12:56 PM)       Thank you!! I put in a phone note summarizing the findings. We can keep his appt as scheduled for April

## 2024-02-12 NOTE — PROGRESS NOTES
CT imaging from 1/2024 ([facility]) reviewed and compared to most recent imaging from 6/2023 ([facility]), showing modest interval improvement in upper lobe disease and near resolution of lower lobe disease. F/u as scheduled with ID and pulm in April
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
## 2024-04-16 ENCOUNTER — OFFICE VISIT (OUTPATIENT)
Dept: INFECTIOUS DISEASES | Facility: MEDICAL CENTER | Age: 64
End: 2024-04-16
Attending: INTERNAL MEDICINE
Payer: COMMERCIAL

## 2024-04-16 VITALS
OXYGEN SATURATION: 99 % | HEIGHT: 68 IN | BODY MASS INDEX: 18.96 KG/M2 | DIASTOLIC BLOOD PRESSURE: 60 MMHG | HEART RATE: 96 BPM | RESPIRATION RATE: 16 BRPM | TEMPERATURE: 97.8 F | WEIGHT: 125.1 LBS | SYSTOLIC BLOOD PRESSURE: 94 MMHG

## 2024-04-16 DIAGNOSIS — B38.2 PULMONARY COCCIDIOIDOMYCOSIS (HCC): ICD-10-CM

## 2024-04-16 DIAGNOSIS — E11.9 TYPE 2 DIABETES MELLITUS WITHOUT COMPLICATION, UNSPECIFIED WHETHER LONG TERM INSULIN USE (HCC): ICD-10-CM

## 2024-04-16 PROCEDURE — 99214 OFFICE O/P EST MOD 30 MIN: CPT | Performed by: INTERNAL MEDICINE

## 2024-04-16 PROCEDURE — 3074F SYST BP LT 130 MM HG: CPT | Performed by: INTERNAL MEDICINE

## 2024-04-16 PROCEDURE — 3078F DIAST BP <80 MM HG: CPT | Performed by: INTERNAL MEDICINE

## 2024-04-16 PROCEDURE — 99211 OFF/OP EST MAY X REQ PHY/QHP: CPT | Performed by: INTERNAL MEDICINE

## 2024-04-16 RX ORDER — FLUCONAZOLE 100 MG/1
600 TABLET ORAL DAILY
Qty: 540 TABLET | Refills: 0 | Status: SHIPPED | OUTPATIENT
Start: 2024-04-16 | End: 2024-04-16

## 2024-04-16 RX ORDER — FLUCONAZOLE 200 MG/1
600 TABLET ORAL DAILY
Qty: 270 TABLET | Refills: 0 | Status: SHIPPED | OUTPATIENT
Start: 2024-04-16 | End: 2024-07-15

## 2024-04-16 ASSESSMENT — ENCOUNTER SYMPTOMS
FEVER: 0
COUGH: 1
WEIGHT LOSS: 1
CHILLS: 0
SPUTUM PRODUCTION: 1
SHORTNESS OF BREATH: 0
HEMOPTYSIS: 1

## 2024-04-16 ASSESSMENT — FIBROSIS 4 INDEX: FIB4 SCORE: 0.86

## 2024-04-26 ENCOUNTER — APPOINTMENT (OUTPATIENT)
Dept: SLEEP MEDICINE | Facility: MEDICAL CENTER | Age: 64
End: 2024-04-26
Attending: NURSE PRACTITIONER
Payer: COMMERCIAL

## 2024-05-15 ENCOUNTER — TELEPHONE (OUTPATIENT)
Dept: INFECTIOUS DISEASES | Facility: MEDICAL CENTER | Age: 64
End: 2024-05-15
Payer: COMMERCIAL

## 2024-05-15 ASSESSMENT — ENCOUNTER SYMPTOMS
WEIGHT LOSS: 1
HEMOPTYSIS: 1
COUGH: 1
CHILLS: 0
SPUTUM PRODUCTION: 1
FEVER: 0
SHORTNESS OF BREATH: 0

## 2024-05-15 NOTE — TELEPHONE ENCOUNTER
Spoke with patient he will get labs done this week at [place], I  advised patient Cocci panel will take about a week to come back. Follow up appointment confirmed. Patient's admission records requested to med rec at [facility] faxed request to [phone]

## 2024-05-15 NOTE — TELEPHONE ENCOUNTER
----- Message from Dee Hampton M.D. sent at 5/15/2024  8:38 AM PDT -----  Regarding: Labs prior to clinic visit  Jimmy Sun,    Please call patient and have him complete labs prior to his clinic visit.  He was also recently hospitalized at an OSH.  Can you please obtain medical records?    Thanks
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
## 2024-05-21 ENCOUNTER — HOSPITAL ENCOUNTER (OUTPATIENT)
Dept: LAB | Facility: MEDICAL CENTER | Age: 64
End: 2024-05-21
Attending: INTERNAL MEDICINE
Payer: COMMERCIAL

## 2024-05-21 ENCOUNTER — OFFICE VISIT (OUTPATIENT)
Dept: INFECTIOUS DISEASES | Facility: MEDICAL CENTER | Age: 64
End: 2024-05-21
Attending: INTERNAL MEDICINE
Payer: COMMERCIAL

## 2024-05-21 VITALS
SYSTOLIC BLOOD PRESSURE: 118 MMHG | TEMPERATURE: 97.7 F | OXYGEN SATURATION: 94 % | WEIGHT: 123.5 LBS | BODY MASS INDEX: 18.72 KG/M2 | HEART RATE: 112 BPM | DIASTOLIC BLOOD PRESSURE: 82 MMHG | HEIGHT: 68 IN | RESPIRATION RATE: 16 BRPM

## 2024-05-21 DIAGNOSIS — B38.2 PULMONARY COCCIDIOIDOMYCOSIS (HCC): ICD-10-CM

## 2024-05-21 DIAGNOSIS — E11.9 TYPE 2 DIABETES MELLITUS WITHOUT COMPLICATION, UNSPECIFIED WHETHER LONG TERM INSULIN USE (HCC): ICD-10-CM

## 2024-05-21 DIAGNOSIS — L98.9 EXTERNAL NASAL LESION: ICD-10-CM

## 2024-05-21 LAB
ALBUMIN SERPL BCP-MCNC: 3.1 G/DL (ref 3.2–4.9)
ALBUMIN/GLOB SERPL: 0.7 G/DL
ALP SERPL-CCNC: 132 U/L (ref 30–99)
ALT SERPL-CCNC: 7 U/L (ref 2–50)
ANION GAP SERPL CALC-SCNC: 13 MMOL/L (ref 7–16)
AST SERPL-CCNC: 7 U/L (ref 12–45)
BASOPHILS # BLD AUTO: 0.6 % (ref 0–1.8)
BASOPHILS # BLD: 0.05 K/UL (ref 0–0.12)
BILIRUB SERPL-MCNC: <0.2 MG/DL (ref 0.1–1.5)
BUN SERPL-MCNC: 29 MG/DL (ref 8–22)
CALCIUM ALBUM COR SERPL-MCNC: 9.8 MG/DL (ref 8.5–10.5)
CALCIUM SERPL-MCNC: 9.1 MG/DL (ref 8.5–10.5)
CHLORIDE SERPL-SCNC: 102 MMOL/L (ref 96–112)
CO2 SERPL-SCNC: 22 MMOL/L (ref 20–33)
CREAT SERPL-MCNC: 0.7 MG/DL (ref 0.5–1.4)
EOSINOPHIL # BLD AUTO: 0.18 K/UL (ref 0–0.51)
EOSINOPHIL NFR BLD: 2.1 % (ref 0–6.9)
ERYTHROCYTE [DISTWIDTH] IN BLOOD BY AUTOMATED COUNT: 50.5 FL (ref 35.9–50)
GFR SERPLBLD CREATININE-BSD FMLA CKD-EPI: 102 ML/MIN/1.73 M 2
GLOBULIN SER CALC-MCNC: 4.3 G/DL (ref 1.9–3.5)
GLUCOSE SERPL-MCNC: 216 MG/DL (ref 65–99)
HCT VFR BLD AUTO: 33.5 % (ref 42–52)
HGB BLD-MCNC: 10.3 G/DL (ref 14–18)
IMM GRANULOCYTES # BLD AUTO: 0.04 K/UL (ref 0–0.11)
IMM GRANULOCYTES NFR BLD AUTO: 0.5 % (ref 0–0.9)
LYMPHOCYTES # BLD AUTO: 1.66 K/UL (ref 1–4.8)
LYMPHOCYTES NFR BLD: 19.8 % (ref 22–41)
MCH RBC QN AUTO: 26.7 PG (ref 27–33)
MCHC RBC AUTO-ENTMCNC: 30.7 G/DL (ref 32.3–36.5)
MCV RBC AUTO: 86.8 FL (ref 81.4–97.8)
MONOCYTES # BLD AUTO: 0.45 K/UL (ref 0–0.85)
MONOCYTES NFR BLD AUTO: 5.4 % (ref 0–13.4)
NEUTROPHILS # BLD AUTO: 6.01 K/UL (ref 1.82–7.42)
NEUTROPHILS NFR BLD: 71.6 % (ref 44–72)
NRBC # BLD AUTO: 0 K/UL
NRBC BLD-RTO: 0 /100 WBC (ref 0–0.2)
PLATELET # BLD AUTO: 376 K/UL (ref 164–446)
PMV BLD AUTO: 8.8 FL (ref 9–12.9)
POTASSIUM SERPL-SCNC: 4.2 MMOL/L (ref 3.6–5.5)
PROT SERPL-MCNC: 7.4 G/DL (ref 6–8.2)
RBC # BLD AUTO: 3.86 M/UL (ref 4.7–6.1)
SODIUM SERPL-SCNC: 137 MMOL/L (ref 135–145)
WBC # BLD AUTO: 8.4 K/UL (ref 4.8–10.8)

## 2024-05-21 PROCEDURE — 3079F DIAST BP 80-89 MM HG: CPT | Performed by: INTERNAL MEDICINE

## 2024-05-21 PROCEDURE — 3074F SYST BP LT 130 MM HG: CPT | Performed by: INTERNAL MEDICINE

## 2024-05-21 PROCEDURE — 99214 OFFICE O/P EST MOD 30 MIN: CPT | Performed by: INTERNAL MEDICINE

## 2024-05-21 RX ORDER — ATORVASTATIN CALCIUM 40 MG/1
1 TABLET, FILM COATED ORAL
COMMUNITY
End: 2024-05-21

## 2024-05-21 RX ORDER — CIPROFLOXACIN 500 MG/1
TABLET, FILM COATED ORAL
COMMUNITY
End: 2024-05-21

## 2024-05-21 RX ORDER — ASPIRIN 81 MG/1
81 TABLET ORAL DAILY
COMMUNITY

## 2024-05-21 RX ORDER — GLIPIZIDE 5 MG/1
1 TABLET ORAL DAILY
COMMUNITY
End: 2024-05-21

## 2024-05-21 RX ORDER — GABAPENTIN 100 MG/1
1 CAPSULE ORAL 3 TIMES DAILY
COMMUNITY
End: 2024-05-21

## 2024-05-21 RX ORDER — METOPROLOL SUCCINATE 25 MG/1
1 TABLET, EXTENDED RELEASE ORAL DAILY
COMMUNITY
End: 2024-05-21

## 2024-05-21 RX ORDER — NITROFURANTOIN 25; 75 MG/1; MG/1
CAPSULE ORAL
COMMUNITY
End: 2024-05-21

## 2024-05-21 RX ORDER — PHENAZOPYRIDINE HYDROCHLORIDE 200 MG/1
TABLET, FILM COATED ORAL
COMMUNITY
End: 2024-05-21

## 2024-05-21 RX ORDER — ATORVASTATIN CALCIUM 10 MG/1
1 TABLET, FILM COATED ORAL
COMMUNITY
End: 2024-05-21

## 2024-05-21 RX ORDER — POLYETHYLENE GLYCOL-3350 AND ELECTROLYTES 236; 6.74; 5.86; 2.97; 22.74 G/274.31G; G/274.31G; G/274.31G; G/274.31G; G/274.31G
POWDER, FOR SOLUTION ORAL
COMMUNITY
End: 2024-05-21

## 2024-05-21 ASSESSMENT — ENCOUNTER SYMPTOMS: BACK PAIN: 0

## 2024-05-21 ASSESSMENT — FIBROSIS 4 INDEX: FIB4 SCORE: 0.86

## 2024-05-26 LAB
C IMMITIS AB SER QL ID: DETECTED
COCCIDIOIDES AB TITR SER CF: ABNORMAL {TITER}
COCCIDIOIDES IGG SER-ACNC: 8.7 IV
COCCIDIOIDES IGM SERPL-ACNC: 0.4 IV

## 2024-07-01 ENCOUNTER — TELEPHONE (OUTPATIENT)
Dept: INFECTIOUS DISEASES | Facility: MEDICAL CENTER | Age: 64
End: 2024-07-01
Payer: COMMERCIAL

## 2024-07-02 ENCOUNTER — APPOINTMENT (OUTPATIENT)
Dept: INFECTIOUS DISEASES | Facility: MEDICAL CENTER | Age: 64
End: 2024-07-02
Attending: INTERNAL MEDICINE
Payer: COMMERCIAL

## 2024-07-25 DIAGNOSIS — B38.2 PULMONARY COCCIDIOIDOMYCOSIS (HCC): ICD-10-CM

## 2024-07-25 DIAGNOSIS — E11.9 TYPE 2 DIABETES MELLITUS WITHOUT COMPLICATION, UNSPECIFIED WHETHER LONG TERM INSULIN USE (HCC): ICD-10-CM

## 2024-07-26 RX ORDER — FLUCONAZOLE 200 MG/1
600 TABLET ORAL DAILY
Qty: 270 TABLET | Refills: 0 | Status: SHIPPED | OUTPATIENT
Start: 2024-07-26 | End: 2024-10-24

## 2024-08-29 ENCOUNTER — OFFICE VISIT (OUTPATIENT)
Dept: INFECTIOUS DISEASES | Facility: MEDICAL CENTER | Age: 64
End: 2024-08-29
Attending: INTERNAL MEDICINE
Payer: COMMERCIAL

## 2024-08-29 VITALS
TEMPERATURE: 98.1 F | DIASTOLIC BLOOD PRESSURE: 70 MMHG | HEART RATE: 117 BPM | OXYGEN SATURATION: 97 % | WEIGHT: 123 LBS | RESPIRATION RATE: 20 BRPM | SYSTOLIC BLOOD PRESSURE: 112 MMHG | HEIGHT: 68 IN | BODY MASS INDEX: 18.64 KG/M2

## 2024-08-29 DIAGNOSIS — E11.9 TYPE 2 DIABETES MELLITUS WITHOUT COMPLICATION, UNSPECIFIED WHETHER LONG TERM INSULIN USE (HCC): ICD-10-CM

## 2024-08-29 DIAGNOSIS — B38.2 PULMONARY COCCIDIOIDOMYCOSIS (HCC): ICD-10-CM

## 2024-08-29 PROCEDURE — 99212 OFFICE O/P EST SF 10 MIN: CPT | Performed by: INTERNAL MEDICINE

## 2024-08-29 RX ORDER — LISINOPRIL 2.5 MG/1
2.5 TABLET ORAL DAILY
COMMUNITY

## 2024-08-29 RX ORDER — FLUCONAZOLE 200 MG/1
600 TABLET ORAL DAILY
Qty: 270 TABLET | Refills: 1 | Status: SHIPPED | OUTPATIENT
Start: 2024-08-29 | End: 2025-02-25

## 2024-08-29 RX ORDER — FERROUS SULFATE 325(65) MG
325 TABLET, DELAYED RELEASE (ENTERIC COATED) ORAL DAILY
COMMUNITY

## 2024-08-29 ASSESSMENT — FIBROSIS 4 INDEX: FIB4 SCORE: 0.45

## 2024-08-29 NOTE — PROGRESS NOTES
Chief Complaint   Patient presents with    Follow-Up     Pulmonary coccidiomycosis       HISTORY OF PRESENT ILLNESS: Patient is a 63 y.o. male established patient who presents today  for FU  Last appt 7/1 cancelled by patient  Prolonged hospitalization from 3/23 - 6/19/2023.  He initially presented to the ED at the outside hospital on 3/3/2023 secondary to altered mental status.  He was found to be altered by a friend in a hotel room with shortness of breath, vomiting and diarrhea.  He ultimately underwent a bronchoscopy with BAL on 3/26/2023 and cocci PCR returned positive.  His serologies were also positive with a complement fixation of 1:16.  There was also concern for possible cutaneous coccidiomycosis.  He underwent a left arm punch biopsy on 3/30/23 however pathology was negative.  Per infectious disease, he was continued on fluconazole 600 mg daily.  Unfortunately, his hospital course was complicated by new right-sided weakness and an MRI on 4/2/2023 showed an acute to subacute small hemorrhagic infarct in the left parieto-occipital lobe as well as a question of leptomeningeal enhancement concerning for meningitis.  Lumbar puncture was performed on 4/10/2023 however CSF cultures and cocci antibody from the CSF were negative.  Further hospital course complicated by multiple episodes of hemoptysis.  Bronchoscopy was done on 6/6/2023 with old blood seen but no active bleeding.  He was taken to IR on 6/7/2023 and underwent left bronchial and left ONEL angiogram and embolization.  Repeat cocci serology revealed a complement fixation of 1: 32.     Patient eventually returned back to [place] in July.  He recently was seen in the pulmonary clinic on 12/18/2023 with Dr. Murray Lam.  Plan is to repeat a CT chest.      On 6/7/2023, complement fixation 1: 32  03/23/2023 Cocci AB CF 1:16, positive IgG at 10.14, IgM negative  4/10/2023 Cocci AB CSF negative    Hospital records reviewed    04/16/2024:   OSH CT chest  reviewed and per my read, there are multiple scattered lesions bilaterally    HgAIc 6.2    01/02/2024:  Cocci Ab CF 1:64  Cocci IgG 7  Cocci IgM 0.1    Patient here for follow-up.  He ambulates with a walker.  He has had persistent productive cough with blood-tinged sputum which has gotten worse for several months.  He denies any fevers nor chills.  He has not been taking fluconazole for the past 2 months as he ran out and his primary care physician did not feel comfortable refilling his prescription.  Unfortunately he did not contact our office to refill the fluconazole.  He continues to have a poor appetite.  Recent weight in March was 131 pounds.  He also endorses a chronic skin lesion on the left upper extremity.  He had a skin biopsy performed in March 2023 at the outside hospital which was concerning for cutaneous coccidiomycosis.  There has been no worsening of the lesion.    8/29  Last labs since 5/21  4 fold drop in titer  No weight gain-123# today  Cough much improved  Remains short of breath  No new skin lesions  Denies SE fluconazole  Does need refill      Patient Active Problem List    Diagnosis Date Noted    Dyslipidemia 12/18/2023    History of CVA (cerebrovascular accident) 12/18/2023    Pulmonary coccidioidomycosis (HCC) 12/18/2023    Retention, urine 11/06/2023    Coccidioidomycosis 10/11/2018    Pneumonia 02/06/2018    Multiple pulmonary nodules 02/06/2018    Acute respiratory failure with hypoxia due to necrotizing pneumonia (CMS-HCC) 02/01/2018    Normocytic anemia 01/29/2018    Hyponatremia 01/29/2018    Hypokalemia 01/29/2018    Hypocalcemia 01/29/2018    Hypoalbuminemia 01/29/2018    Type 2 diabetes mellitus without complication (HCC) 01/29/2018    Necrotizing pneumonia due to MSSA (CMS-HCC) 01/28/2018    Cavitary lesion of lung 01/28/2018       Allergies:Patient has no known allergies.    Current Outpatient Medications   Medication Sig Dispense Refill    ferrous sulfate 325 (65 Fe) MG EC  "tablet Take 325 mg by mouth every day.      lisinopril (PRINIVIL) 2.5 MG Tab Take 2.5 mg by mouth every day.      fluconazole (DIFLUCAN) 200 MG Tab Take 3 Tablets by mouth every day for 180 days. 270 Tablet 1    aspirin 81 MG EC tablet Take 81 mg by mouth every day.      metFORMIN (GLUCOPHAGE) 500 MG Tab Take 500 mg by mouth 3 times a day.      gabapentin (NEURONTIN) 100 MG Cap Take 100 mg by mouth 3 times a day.      metoprolol SR (TOPROL XL) 25 MG TABLET SR 24 HR Take 1 Tablet by mouth every day. 30 Tablet 11    atorvastatin (LIPITOR) 40 MG Tab Take 1 Tablet by mouth at bedtime. 30 Tablet 11     No current facility-administered medications for this visit.       Social History     Tobacco Use    Smoking status: Former     Current packs/day: 0.00     Average packs/day: 1 pack/day for 27.0 years (27.0 ttl pk-yrs)     Types: Cigarettes, Cigars     Start date:      Quit date: 2018     Years since quittin.6    Smokeless tobacco: Current     Types: Chew     Last attempt to quit:     Tobacco comments:     Chewed tabacco for 48 years   Vaping Use    Vaping status: Never Used   Substance Use Topics    Alcohol use: No    Drug use: Never       No family history on file.    ROS:  Review of Systems   Constitutional: Negative for fever, chills, weight loss   Respiratory: Negative for cough      Exam:  /70 (BP Location: Left arm, Patient Position: Sitting, BP Cuff Size: Adult)   Pulse (!) 117   Temp 36.7 °C (98.1 °F) (Temporal)   Resp 20   Ht 1.727 m (5' 8\")   Wt 55.8 kg (123 lb)   SpO2 97%   General:  Frail male in NAD. Pleasant and cooperative  Head: NCAT, Pupils are equal round reactive to light. Extraocular movements intact.   Neck: Supple.  No JVD  Pulmonary: Unlabored  Cardiovascular: Regular rate   Abdominal:  nondistended.   Skin: No rashes. scarring  Extremities: no clubbing, cyanosis, or edema.  Neurologic: Alert and oriented x4. Speech is fluent       Assessment/Plan:  1. Pulmonary " coccidioidomycosis (HCC)  Coccidioides Antibodies Panel, Serum    HEPATIC FUNCTION PANEL    fluconazole (DIFLUCAN) 200 MG Tab      2. Type 2 diabetes mellitus without complication, unspecified whether long term insulin use (HCC)  fluconazole (DIFLUCAN) 200 MG Tab      Refill fluconazole at current dose for now  Recheck titer Nov and hepatic function panel  FU Dec  Consider decrease dose to 400 mg PO daily at next visit if doing well  Will need chronic suppression     Re Bermudez M.D.

## 2024-12-10 ENCOUNTER — TELEPHONE (OUTPATIENT)
Dept: INFECTIOUS DISEASES | Facility: MEDICAL CENTER | Age: 64
End: 2024-12-10
Payer: COMMERCIAL

## 2024-12-10 NOTE — TELEPHONE ENCOUNTER
Message    Appointment canceled for Everardo Breaux ([id number])   Visit type: FOLLOW UP VISIT   12/12/2024 1:00 PM (20 minutes) with Physician Dee Hampton M.D. in [facility]      Reason for cancellation: Lack of Transportation     Appointment canceled  (Newest Message First)  View All Conversations on this Encounter  Everardo Breaux  P [facility]  Staff2 days ago     SH  Appointment canceled for Everardo Breaux ([id number])  Visit type: FOLLOW UP VISIT  12/12/2024 1:00 PM (20 minutes) with Physician Dee Hampton M.D. in [facility]     Reason for cancellation: Lack of Transportation

## 2024-12-12 ENCOUNTER — APPOINTMENT (OUTPATIENT)
Dept: INFECTIOUS DISEASES | Facility: MEDICAL CENTER | Age: 64
End: 2024-12-12
Attending: INTERNAL MEDICINE
Payer: COMMERCIAL

## 2025-05-19 ENCOUNTER — TELEPHONE (OUTPATIENT)
Dept: INFECTIOUS DISEASES | Facility: MEDICAL CENTER | Age: 65
End: 2025-05-19
Payer: COMMERCIAL

## 2025-05-19 NOTE — TELEPHONE ENCOUNTER
Caller Name: Deputy Grupo angel [facility]  Call Back Number: [phone]    How would the patient prefer to be contacted with a response: Phone call OK to leave a detailed message

## 2025-05-19 NOTE — TELEPHONE ENCOUNTER
Phone Number Called: [phone]    Call outcome: LVM     Message: I called Deputy Grupo angel [facility] back and [facility] requesting a call back to ID clinic .

## 2025-05-21 ENCOUNTER — TELEPHONE (OUTPATIENT)
Dept: INFECTIOUS DISEASES | Facility: MEDICAL CENTER | Age: 65
End: 2025-05-21
Payer: COMMERCIAL

## 2025-05-21 NOTE — TELEPHONE ENCOUNTER
Caller Name: Sheriff Deputy Laws  Call Back Number: [phone]    Deputy Laws with [facility] called to report pt  and inquiring if  available to speak and provide cause of death for records and death certificate. Per [facility] records,  last physician to see pt in last 20 days.  I informed the officer, Pt was last seen in ID clinic on 24 with  and was to follow up in December but  had not.   Officer stated since pt not seen by  nor seen with our clinic within the last 20 days then our clinic will not have cause of death and [facility] follow up with their health team.

## (undated) DEVICE — SET LEADWIRE 5 LEAD BEDSIDE DISPOSABLE ECG (1SET OF 5/EA)

## (undated) DEVICE — CONNECTOR HOSE NEPTUNE FOR CYSTO ROOM

## (undated) DEVICE — BAG DRAINAGE ANTIREFLUX TOWER SLIDE TAP 4000 ML (20EA/CA)

## (undated) DEVICE — PROTECTOR ULNA NERVE - (36PR/CA)

## (undated) DEVICE — CANISTER SUCTION 3000ML MECHANICAL FILTER AUTO SHUTOFF MEDI-VAC NONSTERILE LF DISP  (40EA/CA)

## (undated) DEVICE — CATHETER URETHRAL FOLEY LATEX 20 FR 30 CC 3-WAY (12/CA)

## (undated) DEVICE — EVACUATOR BLADDER ELLIK - (10/BX)

## (undated) DEVICE — SENSOR OXIMETER ADULT SPO2 RD SET (20EA/BX)

## (undated) DEVICE — HEAD HOLDER JUNIOR/ADULT

## (undated) DEVICE — TOWEL STOP TIMEOUT SAFETY FLAG (40EA/CA)

## (undated) DEVICE — CANISTER SUCTION RIGID RED 1500CC (40EA/CA)

## (undated) DEVICE — PACK ENT OR - (2EA/CA)

## (undated) DEVICE — CATHETER IV 20 GA X 1-1/4 ---SURG.& SDS ONLY--- (50EA/BX)

## (undated) DEVICE — SODIUM CHL IRRIGATION 0.9% 1000ML (12EA/CA)

## (undated) DEVICE — ELECTRODE BIPOLAR REGULAR CUTTING LOOP URO 24/26 FR (6EA/PK)

## (undated) DEVICE — SYRINGE 3 CC 22 GA X 1-1/2 - NDL SAFETY (50/BX 8BX/CA)

## (undated) DEVICE — KIT  I.V. START (100EA/CA)

## (undated) DEVICE — COVER LIGHT HANDLE ALC PLUS DISP (18EA/BX)

## (undated) DEVICE — SUCTION INSTRUMENT YANKAUER BULBOUS TIP W/O VENT (50EA/CA)

## (undated) DEVICE — TUBING CLEARLINK DUO-VENT - C-FLO (48EA/CA)

## (undated) DEVICE — SET EXTENSION WITH 2 PORTS (48EA/CA) ***PART #2C8610 IS A SUBSTITUTE*****

## (undated) DEVICE — SPONGE GAUZESTER 4 X 4 4PLY - (128PK/CA)

## (undated) DEVICE — TEETHGUARD ENT -2BX MIN ORDER- (6EA/BX)

## (undated) DEVICE — COVER FOOT UNIVERSAL DISP. - (25EA/CA)

## (undated) DEVICE — CONTAINER SPECIMEN BAG OR - STERILE 4 OZ W/LID (100EA/CA)

## (undated) DEVICE — ELECTRODE 850 FOAM ADHESIVE - HYDROGEL RADIOTRNSPRNT (50/PK)

## (undated) DEVICE — SET IRRIGATION CYSTOSCOPY Y-TYPE L81 IN (20EA/CA)

## (undated) DEVICE — LACTATED RINGERS INJ 1000 ML - (14EA/CA 60CA/PF)

## (undated) DEVICE — GLYCINE IRRIGATION 1.5% - 3000 ML  (4/CS)

## (undated) DEVICE — NEPTUNE 4 PORT MANIFOLD - (20/PK)

## (undated) DEVICE — JELLY SURGILUBE STERILE TUBE 4.25 OZ (1/EA)

## (undated) DEVICE — CIRCUIT VENTILATOR PEDIATRIC WITH FILTER  (20EA/CS)

## (undated) DEVICE — SOD. CHL 10CC SYRINGE PREFILL - W/10 CC (30/BX)

## (undated) DEVICE — ELECTRODE DUAL RETURN W/ CORD - (50/PK)

## (undated) DEVICE — SYRINGE 50ML CATHETER TIP (40EA/BX 4BX/CA)

## (undated) DEVICE — TUBE CONNECTING SUCTION - CLEAR PLASTIC STERILE 72 IN (50EA/CA)

## (undated) DEVICE — GLOVE BIOGEL SZ 7.5 SURGICAL PF LTX - (50PR/BX 4BX/CA)

## (undated) DEVICE — WATER IRRIGATION STERILE 1000ML (12EA/CA)

## (undated) DEVICE — SYRINGE 6 CC 20 GA X 1 1/2 - NDL SAFETY  (50/BX)

## (undated) DEVICE — SLEEVE, VASO, THIGH, MED

## (undated) DEVICE — GOWN SURGICAL X-LARGE ULTRA - FILM-REINFORCED (20/CA)

## (undated) DEVICE — BAG URODRAIN WITH TUBING - (20/CA)

## (undated) DEVICE — PACK SINGLE BASIN - (6/CA)

## (undated) DEVICE — MASK ANESTHESIA ADULT  - (100/CA)

## (undated) DEVICE — GOWN WARMING STANDARD FLEX - (30/CA)

## (undated) DEVICE — KIT ANESTHESIA W/CIRCUIT & 3/LT BAG W/FILTER (20EA/CA)

## (undated) DEVICE — PACK CYSTOSCOPY III - (8/CA)

## (undated) DEVICE — GOWN SURGEONS X-LARGE - DISP. (30/CA)

## (undated) DEVICE — SENSOR SPO2 NEO LNCS ADHESIVE (20/BX) SEE USER NOTES